# Patient Record
Sex: MALE | Race: WHITE | NOT HISPANIC OR LATINO | Employment: UNEMPLOYED | URBAN - METROPOLITAN AREA
[De-identification: names, ages, dates, MRNs, and addresses within clinical notes are randomized per-mention and may not be internally consistent; named-entity substitution may affect disease eponyms.]

---

## 2022-08-30 ENCOUNTER — HOSPITAL ENCOUNTER (OUTPATIENT)
Facility: HOSPITAL | Age: 62
Setting detail: OBSERVATION
End: 2022-08-30
Attending: INTERNAL MEDICINE | Admitting: INTERNAL MEDICINE

## 2022-09-09 ENCOUNTER — APPOINTMENT (OUTPATIENT)
Age: 62
End: 2022-09-09

## 2022-09-09 VITALS
OXYGEN SATURATION: 98 % | WEIGHT: 155 LBS | BODY MASS INDEX: 20.99 KG/M2 | SYSTOLIC BLOOD PRESSURE: 127 MMHG | TEMPERATURE: 96.8 F | HEIGHT: 72 IN | RESPIRATION RATE: 14 BRPM | DIASTOLIC BLOOD PRESSURE: 84 MMHG | HEART RATE: 88 BPM

## 2022-09-09 DIAGNOSIS — K21.00 GASTRO-ESOPHAGEAL REFLUX DISEASE WITH ESOPHAGITIS, WITHOUT BLEEDING: ICD-10-CM

## 2022-09-09 PROBLEM — Z00.00 ENCOUNTER FOR PREVENTIVE HEALTH EXAMINATION: Status: ACTIVE | Noted: 2022-09-09

## 2022-09-09 PROCEDURE — XXXXX: CPT | Mod: 1L

## 2022-09-09 NOTE — PHYSICAL EXAM
[Normal Sclera/Conjunctiva] : normal sclera/conjunctiva [Normal] : normal rate, regular rhythm, normal S1 and S2 and no murmur heard

## 2022-09-15 RX ORDER — OMEPRAZOLE 20 MG/1
20 CAPSULE, DELAYED RELEASE ORAL DAILY
Qty: 30 | Refills: 0 | Status: ACTIVE | COMMUNITY
Start: 2022-09-09 | End: 1900-01-01

## 2023-05-14 NOTE — PLAN
[FreeTextEntry1] : 62 y.o. m comes in presenting w/ acute epigastric pain. Most likely GERD with esophagitis w/o hemorrhage. Start omeprazole 20 mg. Patient advised to avoid soda, chocolate, caffeine, and other foods/drinks that can trigger GERD.\par \par Will f/u in 1 month for annual physical.

## 2023-05-14 NOTE — HISTORY OF PRESENT ILLNESS
[FreeTextEntry8] : 62 y.o. m comes in presenting w/ acute epigastric pain that started a week ago and is worse right before going to sleep. Denies any vomiting but has nausea. Has been eating mostly beans, chicken, soda, and coffee. Doesn't eat spicy foods.

## 2023-05-14 NOTE — HEALTH RISK ASSESSMENT
[Current] : Current [20 or more] : 20 or more [No] : In the past 12 months have you used drugs other than those required for medical reasons? No [Audit-CScore] : 0 [de-identified] : beans, chicken, soda, coffee, no spicy food

## 2023-05-14 NOTE — REVIEW OF SYSTEMS
[Abdominal Pain] : abdominal pain [Nausea] : nausea [Negative] : Heme/Lymph [Constipation] : no constipation [Diarrhea] : no diarrhea [Vomiting] : no vomiting [Melena] : no melena

## 2023-06-30 ENCOUNTER — HOSPITAL ENCOUNTER (EMERGENCY)
Facility: HOSPITAL | Age: 63
Discharge: HOME | End: 2023-06-30
Attending: STUDENT IN AN ORGANIZED HEALTH CARE EDUCATION/TRAINING PROGRAM
Payer: MEDICAID

## 2023-06-30 VITALS
RESPIRATION RATE: 15 BRPM | SYSTOLIC BLOOD PRESSURE: 106 MMHG | HEIGHT: 70 IN | HEART RATE: 81 BPM | WEIGHT: 180 LBS | TEMPERATURE: 98 F | OXYGEN SATURATION: 100 % | BODY MASS INDEX: 25.77 KG/M2 | DIASTOLIC BLOOD PRESSURE: 77 MMHG

## 2023-06-30 DIAGNOSIS — M25.50 POLYARTHRALGIA: Primary | ICD-10-CM

## 2023-06-30 PROCEDURE — 94761 N-INVAS EAR/PLS OXIMETRY MLT: CPT

## 2023-06-30 PROCEDURE — 63600175 PHARM REV CODE 636 W HCPCS

## 2023-06-30 PROCEDURE — 99283 EMERGENCY DEPT VISIT LOW MDM: CPT

## 2023-06-30 RX ORDER — PREDNISONE 5 MG/1
TABLET ORAL
Qty: 39 TABLET | Refills: 0 | Status: SHIPPED | OUTPATIENT
Start: 2023-07-01 | End: 2023-07-21

## 2023-06-30 RX ADMIN — PREDNISONE 15 MG: 5 TABLET ORAL at 10:06

## 2023-06-30 NOTE — DISCHARGE INSTRUCTIONS
I sent your prescription to the [facility] pharmacy in [facility].  The address is [address] [place] [address].    Please return to the emergency department if you develop any new or worsening symptoms.  This could include worsening fever, worsening pain, inability to keep food/water down.    Otherwise follow-up with [facility] rheumatology for further workup.

## 2023-06-30 NOTE — ED PROVIDER NOTES
Encounter Date: 6/30/2023       History     Chief Complaint   Patient presents with    Edema     Hx of gout     63-year-old male with no reported past medical history presents to the ED complaining of a 2 day history of worsening joint pain in the bilateral hands bilateral knees and bilateral ankles.  He reports the pain to be in 9/10 in terms of intensity and describes to be sharp in nature.  He states that he went to [facility] where he is prescribed ibuprofen which has not helped alleviate his pain.  Typically these pain flares have occurred in the past and they spontaneously resolve.  He denies chest pain, abdominal pain, headache, fever, chills, nausea, vomiting, diarrhea and dysuria.     The history is provided by the patient and medical records.   Review of patient's allergies indicates:  Not on File  No past medical history on file.  No past surgical history on file.  No family history on file.     Review of Systems    Physical Exam     Initial Vitals [06/30/23 0855]   BP Pulse Resp Temp SpO2   124/74 96 15 97.8 °F (36.6 °C) 100 %      MAP       --         Physical Exam    Nursing note and vitals reviewed.  Constitutional: Vital signs are normal. He appears well-developed and well-nourished. He is not diaphoretic. No distress.   Appears uncomfortable due to symptoms.   HENT:   Head: Normocephalic and atraumatic.   Right Ear: External ear normal.   Left Ear: External ear normal.   Eyes: EOM are normal. Right eye exhibits no discharge. Left eye exhibits no discharge.   Neck: Trachea normal. Neck supple. No thyroid mass present.   Normal range of motion.  Cardiovascular:  Normal rate, regular rhythm, normal heart sounds and intact distal pulses.     Exam reveals no gallop and no friction rub.       No murmur heard.  Pulmonary/Chest: Breath sounds normal. No respiratory distress. He has no wheezes. He has no rhonchi. He has no rales.   Abdominal: Abdomen is soft. Bowel sounds are normal. He exhibits no distension.  There is no abdominal tenderness. There is no rebound and no guarding.   Musculoskeletal:         General: Tenderness (Tenderness along the bilateral hands, bilateral knees and bilateral ankles.) and edema (Bilateral hands, bilateral knees and bilateral ankles) present.      Cervical back: Normal range of motion and neck supple.      Comments: Limited range of motion of the mentioned joints secondary to pain and swelling.     Neurological: He is alert and oriented to person, place, and time. He has normal strength. No cranial nerve deficit or sensory deficit. GCS score is 15. GCS eye subscore is 4. GCS verbal subscore is 5. GCS motor subscore is 6.   Skin: Skin is warm and dry. Capillary refill takes less than 2 seconds. No rash noted.   No erythema or warmth noted at the joints.   Psychiatric: He has a normal mood and affect.       ED Course   Procedures  Labs Reviewed   HIV 1 / 2 ANTIBODY   HEPATITIS C ANTIBODY          Imaging Results    None          Medications   predniSONE tablet 15 mg (15 mg Oral Given 6/30/23 1046)     Medical Decision Making:   Initial Assessment:   63-year-old male who appears to be uncomfortable due to symptoms presents to the ED complaining of polyarthralgia.  ABC's intact.  Initial triage vital signs are within normal limits.  Differential Diagnosis:   Rheumatoid arthritis likely due to positive rheumatoid factor and [facility] rheumatology evaluation and [facility] evaluation.  Gout  Osteoarthritis  Unlikely septic arthritis due to lack of fever, lack of warmth or erythema surrounding the joints.  ED Management:  See ED course.           ED Course as of 06/30/23 1055   Fri Jun 30, 2023   1035 During my initial evaluation patient notified me that he did not take his prednisone taper that was prescribed to him by [facility] for a working diagnosis of rheumatoid arthritis.  I will represcribe him the prednisone taper to the pharmacy that he selected.  I will give him his 1st dose of  15 mg of prednisone here in the ED prior to discharge.  Return precautions provided.  Will discharge the patient. [BG]   1051 Prednisone taper per [facility] rheumatology as follows: [id number] ->[id number] -> 5mg/day [BG]      ED Course User Index  [BG] Vidal Hernandez MD                 Clinical Impression:   Final diagnoses:  [M25.50] Polyarthralgia (Primary)        ED Disposition Condition    Discharge Stable          ED Prescriptions       Medication Sig Dispense Start Date End Date Auth. Provider    predniSONE (DELTASONE) 5 MG tablet Take 3 tablets (15 mg total) by mouth once daily for 6 days, THEN 2 tablets (10 mg total) once daily for 7 days, THEN 1 tablet (5 mg total) once daily for 7 days. 39 tablet 7/1/2023 7/21/2023 Vidal Hernandez MD          Follow-up Information       Follow up With Specialties Details Why Contact Info    [facility] - Emergency Dept Emergency Medicine Go to  If symptoms worsen [id number]  [facility] [phone]             Vidal Hernandez MD  Resident  06/30/23 0617

## 2023-10-06 ENCOUNTER — HOSPITAL ENCOUNTER (INPATIENT)
Facility: HOSPITAL | Age: 63
LOS: 3 days | Discharge: NURSING, REHAB OR LONG-TERM CARE | DRG: 698 | End: 2023-10-09
Attending: STUDENT IN AN ORGANIZED HEALTH CARE EDUCATION/TRAINING PROGRAM | Admitting: HOSPITALIST
Payer: MEDICAID

## 2023-10-06 DIAGNOSIS — E83.52 HYPERCALCEMIA: ICD-10-CM

## 2023-10-06 DIAGNOSIS — R00.0 TACHYCARDIA: ICD-10-CM

## 2023-10-06 DIAGNOSIS — R62.7 FAILURE TO THRIVE IN ADULT: Primary | ICD-10-CM

## 2023-10-06 DIAGNOSIS — M05.79 RHEUMATOID ARTHRITIS INVOLVING MULTIPLE SITES WITH POSITIVE RHEUMATOID FACTOR: Chronic | ICD-10-CM

## 2023-10-06 DIAGNOSIS — R07.9 CHEST PAIN: ICD-10-CM

## 2023-10-06 PROBLEM — N40.1 BPH WITH OBSTRUCTION/LOWER URINARY TRACT SYMPTOMS: Status: ACTIVE | Noted: 2023-09-17

## 2023-10-06 PROBLEM — K59.00 CONSTIPATION: Status: ACTIVE | Noted: 2023-09-22

## 2023-10-06 PROBLEM — E43 SEVERE PROTEIN-CALORIE MALNUTRITION: Status: ACTIVE | Noted: 2023-08-21

## 2023-10-06 PROBLEM — Z72.0 TOBACCO USER: Status: ACTIVE | Noted: 2023-09-22

## 2023-10-06 PROBLEM — R10.13 DYSPEPSIA: Status: ACTIVE | Noted: 2023-09-22

## 2023-10-06 PROBLEM — R53.81 PHYSICAL DECONDITIONING: Status: ACTIVE | Noted: 2023-09-18

## 2023-10-06 PROBLEM — R52 INTRACTABLE PAIN: Status: ACTIVE | Noted: 2023-06-10

## 2023-10-06 PROBLEM — R33.9 RETENTION OF URINE: Status: ACTIVE | Noted: 2023-09-22

## 2023-10-06 PROBLEM — D64.9 ANEMIA: Status: ACTIVE | Noted: 2023-09-22

## 2023-10-06 PROBLEM — D50.9 IRON DEFICIENCY ANEMIA: Status: ACTIVE | Noted: 2023-06-13

## 2023-10-06 PROBLEM — Z22.7 LATENT TUBERCULOSIS: Status: ACTIVE | Noted: 2023-10-06

## 2023-10-06 PROBLEM — M13.0 POLYARTHRITIS: Status: ACTIVE | Noted: 2023-06-13

## 2023-10-06 PROBLEM — F51.01 PRIMARY INSOMNIA: Status: ACTIVE | Noted: 2023-09-22

## 2023-10-06 PROBLEM — N13.8 BPH WITH OBSTRUCTION/LOWER URINARY TRACT SYMPTOMS: Status: ACTIVE | Noted: 2023-09-17

## 2023-10-06 LAB
ALBUMIN SERPL BCP-MCNC: 2.1 G/DL (ref 3.5–5.2)
ALP SERPL-CCNC: 102 U/L (ref 55–135)
ALT SERPL W/O P-5'-P-CCNC: 11 U/L (ref 10–44)
ANION GAP SERPL CALC-SCNC: 11 MMOL/L (ref 8–16)
AST SERPL-CCNC: 12 U/L (ref 10–40)
BASOPHILS # BLD AUTO: 0.01 K/UL (ref 0–0.2)
BASOPHILS NFR BLD: 0.1 % (ref 0–1.9)
BILIRUB SERPL-MCNC: 0.4 MG/DL (ref 0.1–1)
BUN SERPL-MCNC: 11 MG/DL (ref 8–23)
CALCIUM SERPL-MCNC: 12.9 MG/DL (ref 8.7–10.5)
CHLORIDE SERPL-SCNC: 101 MMOL/L (ref 95–110)
CK SERPL-CCNC: <7 U/L (ref 20–200)
CO2 SERPL-SCNC: 30 MMOL/L (ref 23–29)
CREAT SERPL-MCNC: 0.6 MG/DL (ref 0.5–1.4)
DIFFERENTIAL METHOD: ABNORMAL
EOSINOPHIL # BLD AUTO: 0.2 K/UL (ref 0–0.5)
EOSINOPHIL NFR BLD: 2.2 % (ref 0–8)
ERYTHROCYTE [DISTWIDTH] IN BLOOD BY AUTOMATED COUNT: 17 % (ref 11.5–14.5)
EST. GFR  (NO RACE VARIABLE): >60 ML/MIN/1.73 M^2
GLUCOSE SERPL-MCNC: 92 MG/DL (ref 70–110)
HCT VFR BLD AUTO: 26.7 % (ref 40–54)
HCV AB SERPL QL IA: NORMAL
HGB BLD-MCNC: 7.8 G/DL (ref 14–18)
HIV 1+2 AB+HIV1 P24 AG SERPL QL IA: NORMAL
IMM GRANULOCYTES # BLD AUTO: 0.04 K/UL (ref 0–0.04)
IMM GRANULOCYTES NFR BLD AUTO: 0.6 % (ref 0–0.5)
LYMPHOCYTES # BLD AUTO: 1.5 K/UL (ref 1–4.8)
LYMPHOCYTES NFR BLD: 22.4 % (ref 18–48)
MAGNESIUM SERPL-MCNC: 1.6 MG/DL (ref 1.6–2.6)
MCH RBC QN AUTO: 27.4 PG (ref 27–31)
MCHC RBC AUTO-ENTMCNC: 29.2 G/DL (ref 32–36)
MCV RBC AUTO: 94 FL (ref 82–98)
MONOCYTES # BLD AUTO: 0.7 K/UL (ref 0.3–1)
MONOCYTES NFR BLD: 10.8 % (ref 4–15)
NEUTROPHILS # BLD AUTO: 4.3 K/UL (ref 1.8–7.7)
NEUTROPHILS NFR BLD: 63.9 % (ref 38–73)
NRBC BLD-RTO: 0 /100 WBC
PHOSPHATE SERPL-MCNC: 2.6 MG/DL (ref 2.7–4.5)
PLATELET # BLD AUTO: 318 K/UL (ref 150–450)
PMV BLD AUTO: 9.9 FL (ref 9.2–12.9)
POTASSIUM SERPL-SCNC: 3 MMOL/L (ref 3.5–5.1)
PROT SERPL-MCNC: 8.5 G/DL (ref 6–8.4)
RBC # BLD AUTO: 2.85 M/UL (ref 4.6–6.2)
SODIUM SERPL-SCNC: 142 MMOL/L (ref 136–145)
T4 FREE SERPL-MCNC: 1.18 NG/DL (ref 0.71–1.51)
TROPONIN I SERPL DL<=0.01 NG/ML-MCNC: 0.01 NG/ML (ref 0–0.03)
TSH SERPL DL<=0.005 MIU/L-ACNC: 6.33 UIU/ML (ref 0.4–4)
WBC # BLD AUTO: 6.73 K/UL (ref 3.9–12.7)

## 2023-10-06 PROCEDURE — 84439 ASSAY OF FREE THYROXINE: CPT | Performed by: STUDENT IN AN ORGANIZED HEALTH CARE EDUCATION/TRAINING PROGRAM

## 2023-10-06 PROCEDURE — 63600175 PHARM REV CODE 636 W HCPCS: Performed by: STUDENT IN AN ORGANIZED HEALTH CARE EDUCATION/TRAINING PROGRAM

## 2023-10-06 PROCEDURE — 80053 COMPREHEN METABOLIC PANEL: CPT | Performed by: STUDENT IN AN ORGANIZED HEALTH CARE EDUCATION/TRAINING PROGRAM

## 2023-10-06 PROCEDURE — 25000003 PHARM REV CODE 250: Performed by: HOSPITALIST

## 2023-10-06 PROCEDURE — 86803 HEPATITIS C AB TEST: CPT | Performed by: PHYSICIAN ASSISTANT

## 2023-10-06 PROCEDURE — 99285 EMERGENCY DEPT VISIT HI MDM: CPT | Mod: 25

## 2023-10-06 PROCEDURE — 84484 ASSAY OF TROPONIN QUANT: CPT | Performed by: STUDENT IN AN ORGANIZED HEALTH CARE EDUCATION/TRAINING PROGRAM

## 2023-10-06 PROCEDURE — 25000003 PHARM REV CODE 250: Performed by: STUDENT IN AN ORGANIZED HEALTH CARE EDUCATION/TRAINING PROGRAM

## 2023-10-06 PROCEDURE — 85025 COMPLETE CBC W/AUTO DIFF WBC: CPT | Performed by: STUDENT IN AN ORGANIZED HEALTH CARE EDUCATION/TRAINING PROGRAM

## 2023-10-06 PROCEDURE — 84100 ASSAY OF PHOSPHORUS: CPT | Performed by: STUDENT IN AN ORGANIZED HEALTH CARE EDUCATION/TRAINING PROGRAM

## 2023-10-06 PROCEDURE — 84443 ASSAY THYROID STIM HORMONE: CPT | Performed by: STUDENT IN AN ORGANIZED HEALTH CARE EDUCATION/TRAINING PROGRAM

## 2023-10-06 PROCEDURE — 12000002 HC ACUTE/MED SURGE SEMI-PRIVATE ROOM

## 2023-10-06 PROCEDURE — 93010 ELECTROCARDIOGRAM REPORT: CPT | Mod: ,,, | Performed by: INTERNAL MEDICINE

## 2023-10-06 PROCEDURE — 99223 PR INITIAL HOSPITAL CARE,LEVL III: ICD-10-PCS | Mod: ,,, | Performed by: HOSPITALIST

## 2023-10-06 PROCEDURE — 93005 ELECTROCARDIOGRAM TRACING: CPT

## 2023-10-06 PROCEDURE — 83735 ASSAY OF MAGNESIUM: CPT | Performed by: STUDENT IN AN ORGANIZED HEALTH CARE EDUCATION/TRAINING PROGRAM

## 2023-10-06 PROCEDURE — 96360 HYDRATION IV INFUSION INIT: CPT

## 2023-10-06 PROCEDURE — 87389 HIV-1 AG W/HIV-1&-2 AB AG IA: CPT | Performed by: PHYSICIAN ASSISTANT

## 2023-10-06 PROCEDURE — 93010 EKG 12-LEAD: ICD-10-PCS | Mod: ,,, | Performed by: INTERNAL MEDICINE

## 2023-10-06 PROCEDURE — 82550 ASSAY OF CK (CPK): CPT | Performed by: STUDENT IN AN ORGANIZED HEALTH CARE EDUCATION/TRAINING PROGRAM

## 2023-10-06 PROCEDURE — 99223 1ST HOSP IP/OBS HIGH 75: CPT | Mod: ,,, | Performed by: HOSPITALIST

## 2023-10-06 RX ORDER — MIRTAZAPINE 15 MG/1
15 TABLET, FILM COATED ORAL NIGHTLY
Status: ON HOLD | COMMUNITY
Start: 2023-09-18 | End: 2023-10-20 | Stop reason: HOSPADM

## 2023-10-06 RX ORDER — DICLOFENAC SODIUM 10 MG/G
2 GEL TOPICAL 4 TIMES DAILY PRN
COMMUNITY
Start: 2023-09-18

## 2023-10-06 RX ORDER — IBUPROFEN 200 MG
1 TABLET ORAL
Status: ON HOLD | COMMUNITY
Start: 2023-09-20 | End: 2023-10-07 | Stop reason: CLARIF

## 2023-10-06 RX ORDER — RIFAMPIN 300 MG/1
600 CAPSULE ORAL DAILY
COMMUNITY
Start: 2023-09-18

## 2023-10-06 RX ORDER — OXYCODONE HYDROCHLORIDE 5 MG/1
5 TABLET ORAL EVERY 6 HOURS PRN
COMMUNITY
Start: 2023-09-18

## 2023-10-06 RX ORDER — TIZANIDINE 2 MG/1
2 TABLET ORAL EVERY 6 HOURS PRN
COMMUNITY
Start: 2023-09-18

## 2023-10-06 RX ORDER — FOLIC ACID 1 MG/1
1 TABLET ORAL DAILY
COMMUNITY
Start: 2023-09-18

## 2023-10-06 RX ORDER — SULFASALAZINE 500 MG/1
1000 TABLET ORAL 2 TIMES DAILY
COMMUNITY
Start: 2023-09-18

## 2023-10-06 RX ORDER — ACETAMINOPHEN 500 MG
5 TABLET ORAL NIGHTLY
COMMUNITY
Start: 2023-09-16

## 2023-10-06 RX ORDER — FAMOTIDINE 20 MG/1
20 TABLET, FILM COATED ORAL 2 TIMES DAILY
Status: ON HOLD | COMMUNITY
Start: 2023-09-18 | End: 2023-10-20 | Stop reason: HOSPADM

## 2023-10-06 RX ORDER — PREDNISONE 10 MG/1
TABLET ORAL
Status: ON HOLD | COMMUNITY
Start: 2023-09-16 | End: 2023-10-07 | Stop reason: CLARIF

## 2023-10-06 RX ORDER — OXYCODONE HYDROCHLORIDE 5 MG/1
10 TABLET ORAL ONCE
Status: COMPLETED | OUTPATIENT
Start: 2023-10-06 | End: 2023-10-06

## 2023-10-06 RX ORDER — FERROUS SULFATE, DRIED 160(50) MG
1 TABLET, EXTENDED RELEASE ORAL NIGHTLY
Status: ON HOLD | COMMUNITY
Start: 2023-09-16 | End: 2023-10-08 | Stop reason: HOSPADM

## 2023-10-06 RX ORDER — POTASSIUM CHLORIDE 7.45 MG/ML
10 INJECTION INTRAVENOUS
Status: COMPLETED | OUTPATIENT
Start: 2023-10-06 | End: 2023-10-06

## 2023-10-06 RX ORDER — LANOLIN ALCOHOL/MO/W.PET/CERES
1 CREAM (GRAM) TOPICAL NIGHTLY
COMMUNITY
Start: 2023-09-16 | End: 2024-09-15

## 2023-10-06 RX ORDER — MULTIVITAMIN
1 TABLET ORAL DAILY
Status: ON HOLD | COMMUNITY
Start: 2023-08-04 | End: 2023-10-07 | Stop reason: CLARIF

## 2023-10-06 RX ORDER — CALCIUM CARBONATE 200(500)MG
500 TABLET,CHEWABLE ORAL 3 TIMES DAILY PRN
Status: ON HOLD | COMMUNITY
Start: 2023-09-16 | End: 2023-10-08 | Stop reason: HOSPADM

## 2023-10-06 RX ORDER — HYDROXYCHLOROQUINE SULFATE 300 MG/1
300 TABLET ORAL DAILY
COMMUNITY
Start: 2023-09-18

## 2023-10-06 RX ORDER — POLYETHYLENE GLYCOL 3350 17 G/17G
POWDER, FOR SOLUTION ORAL
COMMUNITY
Start: 2023-09-17

## 2023-10-06 RX ORDER — TAMSULOSIN HYDROCHLORIDE 0.4 MG/1
1 CAPSULE ORAL NIGHTLY
COMMUNITY
Start: 2023-09-18

## 2023-10-06 RX ORDER — PREDNISONE 10 MG/1
10 TABLET ORAL NIGHTLY
COMMUNITY
Start: 2023-09-18

## 2023-10-06 RX ORDER — METHOTREXATE 2.5 MG/1
15 TABLET ORAL
Status: ON HOLD | COMMUNITY
Start: 2023-07-08 | End: 2023-10-07 | Stop reason: CLARIF

## 2023-10-06 RX ADMIN — SODIUM CHLORIDE 1000 ML: 9 INJECTION, SOLUTION INTRAVENOUS at 09:10

## 2023-10-06 RX ADMIN — OXYCODONE HYDROCHLORIDE 10 MG: 5 TABLET ORAL at 10:10

## 2023-10-06 RX ADMIN — SODIUM CHLORIDE 1000 ML: 9 INJECTION, SOLUTION INTRAVENOUS at 07:10

## 2023-10-06 RX ADMIN — POTASSIUM CHLORIDE 10 MEQ: 7.46 INJECTION, SOLUTION INTRAVENOUS at 09:10

## 2023-10-06 RX ADMIN — POTASSIUM BICARBONATE 40 MEQ: 391 TABLET, EFFERVESCENT ORAL at 09:10

## 2023-10-06 NOTE — Clinical Note
Diagnosis: Hypercalcemia [275.42.ICD-9-CM]   Admitting Provider:: ESTHER AL [[id number]]   Future Attending Provider: ESTHER AL [[id number]]   Reason for IP Medical Treatment  (Clinical interventions that can only be accomplished in the IP setting? ) :: Hypercalcemia   I certify that Inpatient services for greater than or equal to 2 midnights are medically necessary:: Yes   Plans for Post-Acute care--if anticipated (pick the single best option):: A. No post acute care anticipated at this time   Special Needs:: No Special Needs [1]

## 2023-10-06 NOTE — ED PROVIDER NOTES
Encounter Date: 10/6/2023       History     Chief Complaint   Patient presents with    Failure To Thrive     From [facility]. Nurse states patient no eating or drinking     63 y.o. male with rheumatoid arthritis, physical debility presents via EMS from nursing home for decreased p.o. intake.  Per patient, he reports he ate some grits this morning and thinks he was eating enough and drinking enough today.  He denies any symptoms currently.  He denies any chest pain, shortness of breath, fever, difficulty urinating.  He does have an indwelling Fernandez which appears dark    The history is provided by the patient and medical records.     Review of patient's allergies indicates:  Not on File  No past medical history on file.  No past surgical history on file.  No family history on file.     Review of Systems   Reason unable to perform ROS: See HPI for relevant ROS.       Physical Exam     Initial Vitals [10/06/23 1810]   BP Pulse Resp Temp SpO2   115/68 (!) 130 (!) 22 97.7 °F (36.5 °C) 98 %      MAP       --         Physical Exam    Nursing note and vitals reviewed.  Constitutional:   Alert, speaking full sentences, no acute distress   HENT:   Oropharynx dry   Eyes: Conjunctivae and EOM are normal. Pupils are equal, round, and reactive to light. No scleral icterus.   Cardiovascular:  Regular rhythm and intact distal pulses.           Tachycardic   Pulmonary/Chest: Breath sounds normal. No respiratory distress.   Abdominal: Abdomen is soft. He exhibits no distension.   Musculoskeletal:      Comments: Contractures of bilateral wrists with associated tenderness     Neurological: He is alert and oriented to person, place, and time.   Diminished strength in all 4 extremities  GCS 15   Skin: Skin is warm and dry.         ED Course   Procedures  Labs Reviewed   CBC W/ AUTO DIFFERENTIAL - Abnormal; Notable for the following components:       Result Value    RBC 2.85 (*)     Hemoglobin 7.8 (*)     Hematocrit 26.7 (*)      MCHC 29.2 (*)     RDW 17.0 (*)     Immature Granulocytes 0.6 (*)     All other components within normal limits   COMPREHENSIVE METABOLIC PANEL - Abnormal; Notable for the following components:    Potassium 3.0 (*)     CO2 30 (*)     Calcium 12.9 (*)     Total Protein 8.5 (*)     Albumin 2.1 (*)     All other components within normal limits   PHOSPHORUS - Abnormal; Notable for the following components:    Phosphorus 2.6 (*)     All other components within normal limits   CK - Abnormal; Notable for the following components:    CPK <7 (*)     All other components within normal limits    Narrative:     Add on CK, Trop and TSH per Dr. Dorsey @ 20:24 pm to order #   [id number]   TSH - Abnormal; Notable for the following components:    TSH 6.333 (*)     All other components within normal limits    Narrative:     Add on CK, Trop and TSH per Dr. Dorsey @ 20:24 pm to order #   [id number]   HIV 1 / 2 ANTIBODY    Narrative:     Release to patient->Immediate   HEPATITIS C ANTIBODY    Narrative:     Release to patient->Immediate   MAGNESIUM   TROPONIN I   CK   TSH   TROPONIN I    Narrative:     Add on CK, Trop and TSH per Dr. Dorsey @ 20:24 pm to order #   [id number]   T4, FREE     EKG Readings: (Independently Interpreted)   Sinus tachycardia, regular, narrow complex, rate of 120, no STEMI, nonspecific ST changes inferiorly, no prior available for comparison       Imaging Results              X-Ray Chest AP Portable (Final result)  Result time 10/06/23 19:44:04      Final result by Chetan Romo DO (10/06/23 19:44:04)                   Impression:      No acute abnormality.    Hyperexpanded lungs and emphysematous changes as above.      Electronically signed by: Chetan Romo  Date:    10/06/2023  Time:    19:44               Narrative:    EXAMINATION:  XR CHEST AP PORTABLE    CLINICAL HISTORY:  Tachycardia, unspecified    TECHNIQUE:  Single frontal view of the chest was performed.    COMPARISON:  None    FINDINGS:  The  lungs are hyperexpanded.  There are bullous emphysematous changes of the lung apices.  There is mild coarse chronic interstitial prominence.  There is no large focal consolidation.  The pleural spaces are clear.  The cardiac silhouette is unremarkable.  Osseous structures demonstrate degenerative changes.                                       Medications   sodium chloride 0.9% bolus 1,000 mL 1,000 mL (1,000 mLs Intravenous New Bag 10/6/23 2105)   sodium chloride 0.9% bolus 1,000 mL 1,000 mL (0 mLs Intravenous Stopped 10/6/23 2028)   potassium chloride 10 mEq in 100 mL IVPB (0 mEq Intravenous Stopped 10/6/23 2111)   potassium bicarbonate disintegrating tablet 40 mEq (40 mEq Oral Given 10/6/23 2110)     Medical Decision Making  63 y.o. male with rheumatoid arthritis, physical debility presents via EMS from nursing home for decreased p.o. intake  Differentials include dehydration, JOSE MANUEL, metabolic derangement, failure to thrive  Patient is severely dehydrated on exam, tachycardic.  Patient is alert, no focal neurological deficits, he has diffuse weakness.  He is oriented to person, place, time, and event, including the fact that he was sent in due to not eating and drinking  EKG shows sinus tachycardia, he does have T-wave inversions inferiorly, there is no prior visible EKG for comparison, comments on prior EKGs were reviewed and did not mentioned T-wave inversions,  troponin ordered.  Patient does not have any chest pain or complaints currently  Fernandez in place with dark urine, he denies urinary symptoms    Amount and/or Complexity of Data Reviewed  External Data Reviewed: labs, radiology, ECG and notes.  Labs: ordered. Decision-making details documented in ED Course.  Radiology: ordered.               ED Course as of 10/06/23 2131   Fri Oct 06, 2023   2011 Potassium(!): 3.0 [OK]   2012 Hemoglobin(!): 7.8  At approximate baseline [OK]   2023 Calcium(!!): 12.9 [OK]   2130 Patient found to have multiple metabolic  "derangements, additional IV fluids were ordered as well as potassium replenishment.  Discussed admission plan with patient.  He is amenable to staying, however he feels overwhelmed.  He made statements including he has thought about "giving up".  Patient denies SI at bedside.  Counseled patient at bedside regarding his condition.  He states he is just tired of being in and out of the hospital.  Patient admitted to hospital medicine [OK]      ED Course User Index  [OK] Maco Dorsey MD                      Clinical Impression:   Final diagnoses:  [R00.0] Tachycardia  [E83.52] Hypercalcemia  [R62.7] Failure to thrive in adult (Primary)        ED Disposition Condition    Admit Stable                Maco Dorsey MD  10/06/23 5692    "

## 2023-10-07 PROBLEM — K21.9 GASTROESOPHAGEAL REFLUX DISEASE: Chronic | Status: ACTIVE | Noted: 2023-09-22

## 2023-10-07 PROBLEM — E87.6 HYPOKALEMIA: Status: ACTIVE | Noted: 2023-10-07

## 2023-10-07 PROBLEM — E83.52 HYPERCALCEMIA: Chronic | Status: ACTIVE | Noted: 2023-10-07

## 2023-10-07 PROBLEM — N40.1 URINARY RETENTION DUE TO BENIGN PROSTATIC HYPERPLASIA: Chronic | Status: ACTIVE | Noted: 2023-09-22

## 2023-10-07 PROBLEM — N39.0 URINARY TRACT INFECTION ASSOCIATED WITH INDWELLING URETHRAL CATHETER: Status: ACTIVE | Noted: 2023-10-07

## 2023-10-07 PROBLEM — E83.52 HYPERCALCEMIA: Chronic | Status: ACTIVE | Noted: 2023-08-28

## 2023-10-07 PROBLEM — M05.79 RHEUMATOID ARTHRITIS INVOLVING MULTIPLE SITES WITH POSITIVE RHEUMATOID FACTOR: Chronic | Status: ACTIVE | Noted: 2023-07-12

## 2023-10-07 PROBLEM — T83.511A URINARY TRACT INFECTION ASSOCIATED WITH INDWELLING URETHRAL CATHETER: Status: ACTIVE | Noted: 2023-10-07

## 2023-10-07 PROBLEM — R53.81 PHYSICAL DECONDITIONING: Chronic | Status: ACTIVE | Noted: 2023-09-18

## 2023-10-07 PROBLEM — F51.01 PRIMARY INSOMNIA: Chronic | Status: ACTIVE | Noted: 2023-09-22

## 2023-10-07 PROBLEM — R10.13 DYSPEPSIA: Chronic | Status: ACTIVE | Noted: 2023-09-22

## 2023-10-07 PROBLEM — Z97.8 CHRONIC INDWELLING FOLEY CATHETER: Chronic | Status: ACTIVE | Noted: 2023-10-07

## 2023-10-07 PROBLEM — E83.52 HYPERCALCEMIA: Status: ACTIVE | Noted: 2023-10-07

## 2023-10-07 PROBLEM — D50.9 IRON DEFICIENCY ANEMIA: Chronic | Status: ACTIVE | Noted: 2023-06-13

## 2023-10-07 PROBLEM — R33.8 URINARY RETENTION DUE TO BENIGN PROSTATIC HYPERPLASIA: Chronic | Status: ACTIVE | Noted: 2023-09-22

## 2023-10-07 PROBLEM — Z78.9 NURSING HOME RESIDENT: Chronic | Status: ACTIVE | Noted: 2023-09-22

## 2023-10-07 LAB
25(OH)D3+25(OH)D2 SERPL-MCNC: 27 NG/ML (ref 30–96)
ALBUMIN SERPL BCP-MCNC: 1.8 G/DL (ref 3.5–5.2)
ALP SERPL-CCNC: 88 U/L (ref 55–135)
ALT SERPL W/O P-5'-P-CCNC: 9 U/L (ref 10–44)
AMMONIA PLAS-SCNC: 17 UMOL/L (ref 10–50)
ANION GAP SERPL CALC-SCNC: 10 MMOL/L (ref 8–16)
AST SERPL-CCNC: 12 U/L (ref 10–40)
BACTERIA #/AREA URNS AUTO: ABNORMAL /HPF
BASOPHILS # BLD AUTO: 0 K/UL (ref 0–0.2)
BASOPHILS NFR BLD: 0 % (ref 0–1.9)
BILIRUB SERPL-MCNC: 0.3 MG/DL (ref 0.1–1)
BILIRUB UR QL STRIP: NEGATIVE
BUN SERPL-MCNC: 10 MG/DL (ref 8–23)
CA-I BLDV-SCNC: 1.65 MMOL/L (ref 1.06–1.42)
CALCIUM SERPL-MCNC: 12.2 MG/DL (ref 8.7–10.5)
CHLORIDE SERPL-SCNC: 109 MMOL/L (ref 95–110)
CLARITY UR REFRACT.AUTO: ABNORMAL
CO2 SERPL-SCNC: 25 MMOL/L (ref 23–29)
COLOR UR AUTO: YELLOW
CREAT SERPL-MCNC: 0.6 MG/DL (ref 0.5–1.4)
DIFFERENTIAL METHOD: ABNORMAL
EOSINOPHIL # BLD AUTO: 0.1 K/UL (ref 0–0.5)
EOSINOPHIL NFR BLD: 2 % (ref 0–8)
ERYTHROCYTE [DISTWIDTH] IN BLOOD BY AUTOMATED COUNT: 17.1 % (ref 11.5–14.5)
EST. GFR  (NO RACE VARIABLE): >60 ML/MIN/1.73 M^2
FERRITIN SERPL-MCNC: 1124 NG/ML (ref 20–300)
FOLATE SERPL-MCNC: 12.4 NG/ML (ref 4–24)
GLUCOSE SERPL-MCNC: 140 MG/DL (ref 70–110)
GLUCOSE UR QL STRIP: NEGATIVE
HCT VFR BLD AUTO: 25.5 % (ref 40–54)
HGB BLD-MCNC: 7.4 G/DL (ref 14–18)
HGB UR QL STRIP: ABNORMAL
HYALINE CASTS UR QL AUTO: 0 /LPF
IMM GRANULOCYTES # BLD AUTO: 0.03 K/UL (ref 0–0.04)
IMM GRANULOCYTES NFR BLD AUTO: 0.5 % (ref 0–0.5)
IRON SERPL-MCNC: 15 UG/DL (ref 45–160)
KETONES UR QL STRIP: NEGATIVE
LACTATE SERPL-SCNC: 1.1 MMOL/L (ref 0.5–2.2)
LEUKOCYTE ESTERASE UR QL STRIP: ABNORMAL
LYMPHOCYTES # BLD AUTO: 1.3 K/UL (ref 1–4.8)
LYMPHOCYTES NFR BLD: 22.2 % (ref 18–48)
MAGNESIUM SERPL-MCNC: 1.4 MG/DL (ref 1.6–2.6)
MCH RBC QN AUTO: 26.6 PG (ref 27–31)
MCHC RBC AUTO-ENTMCNC: 29 G/DL (ref 32–36)
MCV RBC AUTO: 92 FL (ref 82–98)
MICROSCOPIC COMMENT: ABNORMAL
MONOCYTES # BLD AUTO: 0.5 K/UL (ref 0.3–1)
MONOCYTES NFR BLD: 8.6 % (ref 4–15)
NEUTROPHILS # BLD AUTO: 4 K/UL (ref 1.8–7.7)
NEUTROPHILS NFR BLD: 66.7 % (ref 38–73)
NITRITE UR QL STRIP: NEGATIVE
NRBC BLD-RTO: 0 /100 WBC
PH UR STRIP: 6 [PH] (ref 5–8)
PHOSPHATE SERPL-MCNC: 2.5 MG/DL (ref 2.7–4.5)
PLATELET # BLD AUTO: 291 K/UL (ref 150–450)
PMV BLD AUTO: 10.6 FL (ref 9.2–12.9)
POCT GLUCOSE: 106 MG/DL (ref 70–110)
POTASSIUM SERPL-SCNC: 3.1 MMOL/L (ref 3.5–5.1)
PROT SERPL-MCNC: 7.5 G/DL (ref 6–8.4)
PROT UR QL STRIP: ABNORMAL
PTH-INTACT SERPL-MCNC: 12.9 PG/ML (ref 9–77)
RBC # BLD AUTO: 2.78 M/UL (ref 4.6–6.2)
RBC #/AREA URNS AUTO: >100 /HPF (ref 0–4)
RETICS/RBC NFR AUTO: 1.6 % (ref 0.4–2)
SATURATED IRON: 11 % (ref 20–50)
SODIUM SERPL-SCNC: 144 MMOL/L (ref 136–145)
SP GR UR STRIP: 1.01 (ref 1–1.03)
SQUAMOUS #/AREA URNS AUTO: 6 /HPF
TOTAL IRON BINDING CAPACITY: 132 UG/DL (ref 250–450)
TRANSFERRIN SERPL-MCNC: 89 MG/DL (ref 200–375)
URN SPEC COLLECT METH UR: ABNORMAL
VIT B12 SERPL-MCNC: 616 PG/ML (ref 210–950)
WBC # BLD AUTO: 6.04 K/UL (ref 3.9–12.7)
WBC #/AREA URNS AUTO: 57 /HPF (ref 0–5)

## 2023-10-07 PROCEDURE — 83735 ASSAY OF MAGNESIUM: CPT | Performed by: HOSPITALIST

## 2023-10-07 PROCEDURE — 84100 ASSAY OF PHOSPHORUS: CPT | Performed by: HOSPITALIST

## 2023-10-07 PROCEDURE — 99233 SBSQ HOSP IP/OBS HIGH 50: CPT | Mod: ,,, | Performed by: HOSPITALIST

## 2023-10-07 PROCEDURE — 87086 URINE CULTURE/COLONY COUNT: CPT | Performed by: HOSPITALIST

## 2023-10-07 PROCEDURE — 84207 ASSAY OF VITAMIN B-6: CPT | Performed by: HOSPITALIST

## 2023-10-07 PROCEDURE — 94761 N-INVAS EAR/PLS OXIMETRY MLT: CPT

## 2023-10-07 PROCEDURE — 82728 ASSAY OF FERRITIN: CPT | Performed by: HOSPITALIST

## 2023-10-07 PROCEDURE — 82746 ASSAY OF FOLIC ACID SERUM: CPT | Performed by: HOSPITALIST

## 2023-10-07 PROCEDURE — 82330 ASSAY OF CALCIUM: CPT | Performed by: HOSPITALIST

## 2023-10-07 PROCEDURE — 63600175 PHARM REV CODE 636 W HCPCS: Performed by: HOSPITALIST

## 2023-10-07 PROCEDURE — 82306 VITAMIN D 25 HYDROXY: CPT | Performed by: HOSPITALIST

## 2023-10-07 PROCEDURE — 85025 COMPLETE CBC W/AUTO DIFF WBC: CPT | Performed by: HOSPITALIST

## 2023-10-07 PROCEDURE — 84466 ASSAY OF TRANSFERRIN: CPT | Performed by: HOSPITALIST

## 2023-10-07 PROCEDURE — 83605 ASSAY OF LACTIC ACID: CPT | Performed by: HOSPITALIST

## 2023-10-07 PROCEDURE — 25000003 PHARM REV CODE 250: Performed by: HOSPITALIST

## 2023-10-07 PROCEDURE — 84425 ASSAY OF VITAMIN B-1: CPT | Performed by: HOSPITALIST

## 2023-10-07 PROCEDURE — 83540 ASSAY OF IRON: CPT | Performed by: HOSPITALIST

## 2023-10-07 PROCEDURE — 81001 URINALYSIS AUTO W/SCOPE: CPT | Performed by: HOSPITALIST

## 2023-10-07 PROCEDURE — 85045 AUTOMATED RETICULOCYTE COUNT: CPT | Performed by: HOSPITALIST

## 2023-10-07 PROCEDURE — 99233 PR SUBSEQUENT HOSPITAL CARE,LEVL III: ICD-10-PCS | Mod: ,,, | Performed by: HOSPITALIST

## 2023-10-07 PROCEDURE — 84630 ASSAY OF ZINC: CPT | Performed by: HOSPITALIST

## 2023-10-07 PROCEDURE — 83970 ASSAY OF PARATHORMONE: CPT | Performed by: HOSPITALIST

## 2023-10-07 PROCEDURE — 21400001 HC TELEMETRY ROOM

## 2023-10-07 PROCEDURE — 82607 VITAMIN B-12: CPT | Performed by: HOSPITALIST

## 2023-10-07 PROCEDURE — 80053 COMPREHEN METABOLIC PANEL: CPT | Performed by: HOSPITALIST

## 2023-10-07 PROCEDURE — 82140 ASSAY OF AMMONIA: CPT | Performed by: HOSPITALIST

## 2023-10-07 RX ORDER — POLYETHYLENE GLYCOL 3350 17 G/17G
17 POWDER, FOR SOLUTION ORAL DAILY
Status: DISCONTINUED | OUTPATIENT
Start: 2023-10-07 | End: 2023-10-09 | Stop reason: HOSPADM

## 2023-10-07 RX ORDER — CHLORHEXIDINE GLUCONATE ORAL RINSE 1.2 MG/ML
15 SOLUTION DENTAL 2 TIMES DAILY
Status: DISCONTINUED | OUTPATIENT
Start: 2023-10-07 | End: 2023-10-09 | Stop reason: HOSPADM

## 2023-10-07 RX ORDER — ACETAMINOPHEN 500 MG
1000 TABLET ORAL EVERY 8 HOURS PRN
Status: DISCONTINUED | OUTPATIENT
Start: 2023-10-07 | End: 2023-10-09 | Stop reason: HOSPADM

## 2023-10-07 RX ORDER — ENOXAPARIN SODIUM 100 MG/ML
40 INJECTION SUBCUTANEOUS EVERY 24 HOURS
Status: DISCONTINUED | OUTPATIENT
Start: 2023-10-07 | End: 2023-10-09 | Stop reason: HOSPADM

## 2023-10-07 RX ORDER — SODIUM CHLORIDE 0.9 % (FLUSH) 0.9 %
10 SYRINGE (ML) INJECTION EVERY 6 HOURS PRN
Status: DISCONTINUED | OUTPATIENT
Start: 2023-10-07 | End: 2023-10-09 | Stop reason: HOSPADM

## 2023-10-07 RX ORDER — TAMSULOSIN HYDROCHLORIDE 0.4 MG/1
1 CAPSULE ORAL NIGHTLY
Status: DISCONTINUED | OUTPATIENT
Start: 2023-10-07 | End: 2023-10-09 | Stop reason: HOSPADM

## 2023-10-07 RX ORDER — TALC
6 POWDER (GRAM) TOPICAL NIGHTLY PRN
Status: DISCONTINUED | OUTPATIENT
Start: 2023-10-07 | End: 2023-10-09 | Stop reason: HOSPADM

## 2023-10-07 RX ORDER — FERROUS SULFATE, DRIED 160(50) MG
1 TABLET, EXTENDED RELEASE ORAL NIGHTLY
Status: DISCONTINUED | OUTPATIENT
Start: 2023-10-07 | End: 2023-10-07

## 2023-10-07 RX ORDER — SULFASALAZINE 500 MG/1
1000 TABLET ORAL 2 TIMES DAILY
Status: DISCONTINUED | OUTPATIENT
Start: 2023-10-07 | End: 2023-10-09 | Stop reason: HOSPADM

## 2023-10-07 RX ORDER — TIZANIDINE 2 MG/1
2 TABLET ORAL EVERY 6 HOURS PRN
Status: DISCONTINUED | OUTPATIENT
Start: 2023-10-07 | End: 2023-10-09 | Stop reason: HOSPADM

## 2023-10-07 RX ORDER — THIAMINE HCL 100 MG
100 TABLET ORAL NIGHTLY
Status: DISCONTINUED | OUTPATIENT
Start: 2023-10-07 | End: 2023-10-09 | Stop reason: HOSPADM

## 2023-10-07 RX ORDER — OXYCODONE HYDROCHLORIDE 5 MG/1
5 TABLET ORAL EVERY 6 HOURS PRN
Status: DISCONTINUED | OUTPATIENT
Start: 2023-10-07 | End: 2023-10-09 | Stop reason: HOSPADM

## 2023-10-07 RX ORDER — MAGNESIUM SULFATE HEPTAHYDRATE 40 MG/ML
2 INJECTION, SOLUTION INTRAVENOUS ONCE
Status: COMPLETED | OUTPATIENT
Start: 2023-10-07 | End: 2023-10-07

## 2023-10-07 RX ORDER — AMOXICILLIN 250 MG
1 CAPSULE ORAL 2 TIMES DAILY
Status: DISCONTINUED | OUTPATIENT
Start: 2023-10-07 | End: 2023-10-09 | Stop reason: HOSPADM

## 2023-10-07 RX ORDER — PROCHLORPERAZINE EDISYLATE 5 MG/ML
5 INJECTION INTRAMUSCULAR; INTRAVENOUS EVERY 6 HOURS PRN
Status: DISCONTINUED | OUTPATIENT
Start: 2023-10-07 | End: 2023-10-09 | Stop reason: HOSPADM

## 2023-10-07 RX ORDER — RIFAMPIN 300 MG/1
600 CAPSULE ORAL DAILY
Status: DISCONTINUED | OUTPATIENT
Start: 2023-10-07 | End: 2023-10-09 | Stop reason: HOSPADM

## 2023-10-07 RX ORDER — CALCIUM CARBONATE 200(500)MG
500 TABLET,CHEWABLE ORAL 3 TIMES DAILY PRN
Status: DISCONTINUED | OUTPATIENT
Start: 2023-10-07 | End: 2023-10-08

## 2023-10-07 RX ORDER — ONDANSETRON 2 MG/ML
4 INJECTION INTRAMUSCULAR; INTRAVENOUS EVERY 8 HOURS PRN
Status: DISCONTINUED | OUTPATIENT
Start: 2023-10-07 | End: 2023-10-09 | Stop reason: HOSPADM

## 2023-10-07 RX ORDER — PREDNISONE 5 MG/1
10 TABLET ORAL
Status: DISCONTINUED | OUTPATIENT
Start: 2023-10-07 | End: 2023-10-09 | Stop reason: HOSPADM

## 2023-10-07 RX ORDER — AMMONIUM LACTATE 12 G/100G
LOTION TOPICAL 2 TIMES DAILY PRN
Status: DISCONTINUED | OUTPATIENT
Start: 2023-10-07 | End: 2023-10-09 | Stop reason: HOSPADM

## 2023-10-07 RX ORDER — NALOXONE HCL 0.4 MG/ML
0.02 VIAL (ML) INJECTION
Status: DISCONTINUED | OUTPATIENT
Start: 2023-10-07 | End: 2023-10-09 | Stop reason: HOSPADM

## 2023-10-07 RX ORDER — FOLIC ACID 1 MG/1
1 TABLET ORAL DAILY
Status: DISCONTINUED | OUTPATIENT
Start: 2023-10-07 | End: 2023-10-09 | Stop reason: HOSPADM

## 2023-10-07 RX ORDER — DICLOFENAC SODIUM 10 MG/G
2 GEL TOPICAL 4 TIMES DAILY PRN
Status: DISCONTINUED | OUTPATIENT
Start: 2023-10-07 | End: 2023-10-09 | Stop reason: HOSPADM

## 2023-10-07 RX ORDER — HYDROXYCHLOROQUINE SULFATE 100 MG/1
300 TABLET ORAL DAILY
Status: DISCONTINUED | OUTPATIENT
Start: 2023-10-07 | End: 2023-10-08

## 2023-10-07 RX ORDER — DEXTROSE MONOHYDRATE AND SODIUM CHLORIDE 5; .9 G/100ML; G/100ML
INJECTION, SOLUTION INTRAVENOUS CONTINUOUS
Status: DISCONTINUED | OUTPATIENT
Start: 2023-10-07 | End: 2023-10-08

## 2023-10-07 RX ORDER — FAMOTIDINE 20 MG/1
20 TABLET, FILM COATED ORAL 2 TIMES DAILY
Status: DISCONTINUED | OUTPATIENT
Start: 2023-10-07 | End: 2023-10-09 | Stop reason: HOSPADM

## 2023-10-07 RX ORDER — ACETAMINOPHEN 325 MG/1
975 TABLET ORAL EVERY 6 HOURS PRN
COMMUNITY

## 2023-10-07 RX ORDER — MIRTAZAPINE 15 MG/1
15 TABLET, FILM COATED ORAL NIGHTLY
Status: DISCONTINUED | OUTPATIENT
Start: 2023-10-07 | End: 2023-10-09 | Stop reason: HOSPADM

## 2023-10-07 RX ORDER — TALC
6 POWDER (GRAM) TOPICAL NIGHTLY
Status: DISCONTINUED | OUTPATIENT
Start: 2023-10-07 | End: 2023-10-07

## 2023-10-07 RX ADMIN — THIAMINE HCL TAB 100 MG 100 MG: 100 TAB at 09:10

## 2023-10-07 RX ADMIN — FAMOTIDINE 20 MG: 20 TABLET ORAL at 08:10

## 2023-10-07 RX ADMIN — FAMOTIDINE 20 MG: 20 TABLET ORAL at 09:10

## 2023-10-07 RX ADMIN — CHLORHEXIDINE GLUCONATE 0.12% ORAL RINSE 15 ML: 1.2 LIQUID ORAL at 08:10

## 2023-10-07 RX ADMIN — MIRTAZAPINE 15 MG: 15 TABLET, FILM COATED ORAL at 09:10

## 2023-10-07 RX ADMIN — SENNOSIDES AND DOCUSATE SODIUM 1 TABLET: 50; 8.6 TABLET ORAL at 09:10

## 2023-10-07 RX ADMIN — RIFAMPIN 600 MG: 300 CAPSULE ORAL at 08:10

## 2023-10-07 RX ADMIN — PREDNISONE 10 MG: 5 TABLET ORAL at 06:10

## 2023-10-07 RX ADMIN — MAGNESIUM SULFATE HEPTAHYDRATE 2 G: 40 INJECTION, SOLUTION INTRAVENOUS at 06:10

## 2023-10-07 RX ADMIN — SULFASALAZINE 1000 MG: 500 TABLET ORAL at 08:10

## 2023-10-07 RX ADMIN — CEFTRIAXONE 2 G: 2 INJECTION, POWDER, FOR SOLUTION INTRAMUSCULAR; INTRAVENOUS at 09:10

## 2023-10-07 RX ADMIN — FOLIC ACID 1 MG: 1 TABLET ORAL at 08:10

## 2023-10-07 RX ADMIN — ENOXAPARIN SODIUM 40 MG: 40 INJECTION SUBCUTANEOUS at 06:10

## 2023-10-07 RX ADMIN — THERA TABS 1 TABLET: TAB at 08:10

## 2023-10-07 RX ADMIN — DEXTROSE AND SODIUM CHLORIDE: 5; 900 INJECTION, SOLUTION INTRAVENOUS at 05:10

## 2023-10-07 RX ADMIN — SENNOSIDES AND DOCUSATE SODIUM 1 TABLET: 50; 8.6 TABLET ORAL at 08:10

## 2023-10-07 RX ADMIN — TAMSULOSIN HYDROCHLORIDE 0.4 MG: 0.4 CAPSULE ORAL at 09:10

## 2023-10-07 RX ADMIN — POTASSIUM BICARBONATE 40 MEQ: 391 TABLET, EFFERVESCENT ORAL at 06:10

## 2023-10-07 RX ADMIN — SULFASALAZINE 1000 MG: 500 TABLET ORAL at 09:10

## 2023-10-07 RX ADMIN — POLYETHYLENE GLYCOL 3350 17 G: 17 POWDER, FOR SOLUTION ORAL at 08:10

## 2023-10-07 RX ADMIN — CHLORHEXIDINE GLUCONATE 0.12% ORAL RINSE 15 ML: 1.2 LIQUID ORAL at 09:10

## 2023-10-07 NOTE — ASSESSMENT & PLAN NOTE
-continue his home medications  He takes MTX injection qSundays - discuss with pharmacy if that is available or if any oral dosage can substitute  F[facility]u with [facility] as outpatient for ABATACEPT infusions.

## 2023-10-07 NOTE — ED TRIAGE NOTES
Chirag Beckham, a 63 y.o. male presents to the ED w/ complaint of Per [facility] patient has had decreased PO intake.  Patient states he is hungry and thirsty but is having trouble eating.    Triage note:  Chief Complaint   Patient presents with    Failure To Thrive     From [facility]. Nurse states patient no eating or drinking     Review of patient's allergies indicates:  Not on File  No past medical history on file.

## 2023-10-07 NOTE — ASSESSMENT & PLAN NOTE
-patient with malfunctioning disla catheter - noted to have >700cc on bladder scan and bladder distension pain, he is very tachycardic despite 2L of IV fluids  - suspect pain/discomfort is driving this     Replace disla catheter  - from available data suspect it was placed during the recent [facility] hospitalization on 09/15, nursing home had target date 10/15 for disla replacement     Send UA with reflex culture

## 2023-10-07 NOTE — ASSESSMENT & PLAN NOTE
Chronic, likely due to immobility and chronic calcium intake. Hold home calcium-vitamin D. Giving IV fluids.

## 2023-10-07 NOTE — ASSESSMENT & PLAN NOTE
"The patient has hypercalcemia that is currently uncontrolled. The patient has the following symptoms due to their hypercalcemia: abdominal pain, constipation and weakness. The hypercalcemia is likely due to immobility, severe dehydration. We will obtain the following labs to work up the hypercalcemia: PTH No results found for: "PTH" . We will treat the hypercalcemia with: IV fluids ordered at a rate of 200 ml/hr. Their latest calcium has been reviewed and is listed below.  No results found for: "CAION"     -trend chemistry   "

## 2023-10-07 NOTE — NURSING
Nurses Note -- 4 Eyes      10/7/2023   6:33 AM      Skin assessed during: Admit      [] No Altered Skin Integrity Present    []Prevention Measures Documented      [x] Yes- Altered Skin Integrity Present or Discovered   [] LDA Added if Not in Epic (Describe Wound)   [] New Altered Skin Integrity was Present on Admit and Documented in LDA   [] Wound Image Taken    Wound Care Consulted? Yes    Attending Nurse:  Zoila Victoria RN/Staff Member:     Queenie

## 2023-10-07 NOTE — PLAN OF CARE
[facility] - Intensive Care ([place] [address])  Initial Discharge Assessment       Primary Care Provider: Miguel Gardner MD    Admission Diagnosis: Hypercalcemia [E83.52]  Tachycardia [R00.0]  Failure to thrive in adult [R62.7]    Admission Date: 10/6/2023  Expected Discharge Date:     Transition of Care Barriers: Transportation    Payor: MEDICAID / Plan: [facility] COMMUNITY PLAN [facility] [place] [employer] ([place] MEDICAID[place] / Product Type: Managed Medicaid /     Extended Emergency Contact Information  Primary Emergency Contact: Jeanine Beckham  Home Phone: [phone]  Mobile Phone: [phone]  Relation: Sister  Secondary Emergency Contact: Ericka Beckham  Home Phone: [phone]  Relation: Sister    Discharge Plan A: Home  Discharge Plan B: Home with family      [facility] [address] at [place], [place] - [address] [facility]  [address] [facility]  [address]  [facility] [address]  Phone: [phone] Fax: [phone]    SW met with patient at bedside to complete discharge planning assessment.  Patient alert and oriented xs 4.  Patient verified all demographic information on facesheet is correct.  Patient verified PCP is Dr. Gardner.  Patient verified primary health insurance is [facility] Medicaid.  Patient with NO home health or DME.  Patient with NO POA or Living Will.  Patient not on dialysis or medication coumadin.  Patient with no 30 day admission.  Patient with no financial issues at this time.  Patient WILL NEED transportation upon discharge from facility.  Patient independent with ADLs, live alone with family assistance. Patient states he uses public transportation to get to and from medical appointments.      Initial Assessment (most recent)       Adult Discharge Assessment - 10/07/23 1458          Discharge Assessment    Assessment Type Discharge Planning Assessment     Confirmed/corrected address, phone number and insurance Yes     Confirmed Demographics Correct on Facesheet     Source of Information patient      Communicated YUDELKA with patient/caregiver Date not available/Unable to determine     People in Home alone     Facility Arrived From: home     Do you expect to return to your current living situation? Yes     Do you have help at home or someone to help you manage your care at home? Yes     Who are your caregiver(s) and their phone number(s)? family     Prior to hospitilization cognitive status: Alert/Oriented     Current cognitive status: Alert/Oriented     Equipment Currently Used at Home none     Readmission within 30 days? No     Patient currently being followed by outpatient case management? No     Do you currently have service(s) that help you manage your care at home? No     Do you take prescription medications? Yes     Do you have prescription coverage? Yes     Do you have any problems affording any of your prescribed medications? No     Is the patient taking medications as prescribed? yes     Who is going to help you get home at discharge? family     How do you get to doctors appointments? public transportation;health plan transportation     Are you on dialysis? No     Do you take coumadin? No     DME Needed Upon Discharge  none     Discharge Plan discussed with: Patient     Transition of Care Barriers Transportation     Discharge Plan A Home     Discharge Plan B Home with family        Physical Activity    On average, how many days per week do you engage in moderate to strenuous exercise (like a brisk walk)? Patient refused     On average, how many minutes do you engage in exercise at this level? Patient refused        Financial Resource Strain    How hard is it for you to pay for the very basics like food, housing, medical care, and heating? Patient refused        Housing Stability    In the last 12 months, was there a time when you were not able to pay the mortgage or rent on time? Patient refused     In the last 12 months, was there a time when you did not have a steady place to sleep or slept in a shelter  (including now)? Patient refused        Transportation Needs    In the past 12 months, has lack of transportation kept you from medical appointments or from getting medications? Patient refused     In the past 12 months, has lack of transportation kept you from meetings, work, or from getting things needed for daily living? Patient refused        Food Insecurity    Within the past 12 months, you worried that your food would run out before you got the money to buy more. Patient refused     Within the past 12 months, the food you bought just didn't last and you didn't have money to get more. Patient refused        Stress    Do you feel stress - tense, restless, nervous, or anxious, or unable to sleep at night because your mind is troubled all the time - these days? Patient refused        Social Connections    In a typical week, how many times do you talk on the phone with family, friends, or neighbors? Patient refused     How often do you get together with friends or relatives? Patient refused     How often do you attend Restorationist or Jehovah's witness services? Patient refused     Do you belong to any clubs or organizations such as Restorationist groups, unions, fraternal or athletic groups, or school groups? Patient refused     How often do you attend meetings of the clubs or organizations you belong to? Patient refused     Are you , , , , never , or living with a partner? Patient refused        Alcohol Use    Q1: How often do you have a drink containing alcohol? Patient refused     Q2: How many drinks containing alcohol do you have on a typical day when you are drinking? Patient refused     Q3: How often do you have six or more drinks on one occasion? Patient refused

## 2023-10-07 NOTE — CARE UPDATE
Ua with pyuria, hematuria and proteinuria    Add on prot/cr Ur and urine gram stain    Start ceftriaxone.

## 2023-10-07 NOTE — ED NOTES
Telemetry Verification   Patient placed on Telemetry Box  Verified with War Room  Box # [id number]   Monitor Tech War room   Rate 107   Rhythm Sinus tach

## 2023-10-07 NOTE — ASSESSMENT & PLAN NOTE
Continue home hydroxychloroquine, sulfasalazine, folic acid. Should hold home methotrexate during infection. He will be getting abatacept infusions outpatient.

## 2023-10-07 NOTE — PROGRESS NOTES
[facility] - Intensive Care ([address] Medicine  Progress Note    Patient Name: Chirag Beckham  MRN: [id number]  Patient Class: IP- Inpatient   Admission Date: 10/6/2023  Length of Stay: 1 days  Attending Physician: Fredrick Carbajal MD  Primary Care Provider: Miguel Gardner MD        Subjective:     Principal Problem:Urinary tract infection associated with indwelling urethral catheter        HPI:  Chirag Beckham is a 63 year old Black man with smoking, disabling rheumatoid arthritis, hypercalcemia since 8/28/2023, iron deficiency anemia, prediabetes, benign prostatic hyperplasia with urinary retention with chronic indwelling Fernandez catheter, gastroesophageal reflux disease, insomnia, 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 He lives in [facility] in [place], [place]. His primary care physician is Dr. Miguel Gardner. He gets his medical care at [facility].   He was hospitalized at [facility] from 7/13/2023 to 7/19/2023 for rheumatoid arthritis flare, emphysema, and latent tuberculosis infection.   He was hospitalized at [facility] from 7/19/2023 to 8/8/2023 for atypical chest pain and sepsis.   He was hospitalized at [facility] from 8/18/2023 to 9/18/2023 for rheumatoid arthritis pain and pneumonia. He completed an antibiotic course for community acquired pneumonia. Infectious Disease recommended continuing rifampin for his Mycobacterium fortuitum. It was started on 7/20/2023 and will isacc on 11/20/2023. Rheumatology recommended daily low dose prednisone with calcium and vitamin D, folic acid, weekly subcutaneous methotrexate, hydroxychloroquine daily, sulfasalazine twice daily, and outpatient infusions of abatacept 750 mg on week 0, 2, 4 and every 4 weeks thereafter at [facility]. He endorsed depressed mood due to his medical conditions, immobility/bed bound status, and  nursing home placement. He was put on mirtazapine. Indwelling Fernandez catheter was placed. He was discharged to [facility] as a new assisted nursing home resident.   He was approved for abatacept on 10/4/2023.   He presented to [facility] Emergency [facility] on 109/6/2023 with poor oral intake. He had never been at an [facility] hospital previously but medical records from [facility] were available to review. He was moaning and complaining of bladder pain. He commented that his Fernandez catheter needed to be replaced. He had suprapubic distension and tenderness. Labs showed calcium 12.9 mg/dL, increased from 11.1 on 9/18/2023, potassium 3.0 mmol/L, magnesium 1.6 mg/dL. He was given oxycodone, potassium bicarbonate, potassium chloride, 2 liters of normal saline. He was admitted to Hospital Medicine Team D.      Overview/Hospital Course:  He was put on 5% dextrose 0.9% saline infusion at 200 mL/hr. Urinalysis showed 2+ protein, >100 RBC/hpf, 57 WBC/hpf, and moderate bacteria. He was put on ceftriaxone.       Interval History: I let him know he was found to have a UTI.    Review of Systems   Constitutional:  Negative for chills and fever.   Neurological:  Negative for seizures and syncope.     Objective:     Vital Signs (Most Recent):  Temp: 97.9 °F (36.6 °C) (10/07/23 1134)  Pulse: 110 (10/07/23 1521)  Resp: 18 (10/07/23 1134)  BP: 106/74 (10/07/23 1134)  SpO2: 98 % (10/07/23 1134) Vital Signs (24h Range):  Temp:  [97.7 °F (36.5 °C)-98.5 °F (36.9 °C)] 97.9 °F (36.6 °C)  Pulse:  [104-130] 110  Resp:  [17-22] 18  SpO2:  [93 %-100 %] 98 %  BP: (106-124)/(68-79) 106/74     Weight: 81.6 kg (180 lb)  Body mass index is 25.83 kg/m².    Intake/Output Summary (Last 24 hours) at 10/7/2023 1544  Last data filed at 10/7/2023 0557  Gross per 24 hour   Intake 2011.67 ml   Output 811 ml   Net 1200.67 ml         Physical Exam  Vitals and nursing note reviewed.   Constitutional:        General: He is not in acute distress.     Appearance: He is well-developed. He is not diaphoretic.   Pulmonary:      Effort: Pulmonary effort is normal. No respiratory distress.   Genitourinary:     Penis: Normal.       Comments: Fernandez catheter with blood-tinged urine in bag  Musculoskeletal:         General: Deformity present.   Skin:     General: Skin is warm and dry.      Coloration: Skin is not jaundiced or pale.   Neurological:      Mental Status: He is alert and oriented to person, place, and time.      Motor: No tremor or seizure activity.   Psychiatric:         Attention and Perception: Attention normal.         Mood and Affect: Mood is depressed. Affect is blunt.         Behavior: Behavior is cooperative.             Significant Labs: All pertinent labs within the past 24 hours have been reviewed.    Significant Imaging: I have reviewed all pertinent imaging results/findings within the past 24 hours.  X-Ray Chest AP Portable 10/06/23: FINDINGS:   The lungs are hyperexpanded.  There are bullous emphysematous changes of the lung apices.  There is mild coarse chronic interstitial prominence.  There is no large focal consolidation.  The pleural spaces are clear.  The cardiac silhouette is unremarkable.  Osseous structures demonstrate degenerative changes.   Impression:  No acute abnormality.   Hyperexpanded lungs and emphysematous changes as above.       Assessment/Plan:      * Urinary tract infection associated with indwelling urethral catheter  Chronic indwelling Fernandez catheter  Giving ceftriaxone. Follow up urine culture results.    Nursing home resident  Return to [facility] after discharge.    Hypercalcemia  Chronic, likely due to immobility and chronic calcium intake. Hold home calcium-vitamin D. Giving IV fluids.    Hypokalemia  Hypomagnesemia  Give potassium and magnesium.     Severe protein-calorie malnutrition  Nutrition consult     Urinary retention due to benign prostatic  hyperplasia  Replaced Fernandez catheter. Continue home tamsulosin.    Rheumatoid arthritis involving multiple sites with positive rheumatoid factor  Continue home hydroxychloroquine, sulfasalazine, folic acid. Should hold home methotrexate during infection. He will be getting abatacept infusions outpatient.     Primary insomnia  Continue home mirtazapine.    Physical deconditioning  Reportedly bed bound.    Latent tuberculosis  Continue home rifampin until 11/20/2023.    Iron deficiency anemia  Iron studies suggest anemia of chronic disease.    Constipation  Likely due to hypercalcemia. Giving senna-docusate and polyethylene glycol.    Gastroesophageal reflux disease  Continue home Tums PRN.      VTE Risk Mitigation (From admission, onward)         Ordered     enoxaparin injection 40 mg  Daily         10/07/23 0352     IP VTE HIGH RISK PATIENT  Once         10/07/23 0352     Place sequential compression device  Until discontinued         10/07/23 0352                Discharge Planning   YUDELKA:      Code Status: Full Code   Is the patient medically ready for discharge?: No    Reason for patient still in hospital (select all that apply): Patient trending condition and Treatment  Discharge Plan A: Home                  Fredrick Carbajal MD  Department of Hospital Medicine   [facility] - Intensive Care ([place])

## 2023-10-07 NOTE — SUBJECTIVE & OBJECTIVE
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 NOTE: AFB from 7/17/2023 grew M. fortuitum. AFB smear was negative and all other smears and cultures have been negative to date. This organism is common in water and soil and represents either a transient infection or contamination (most likely the latter given no other evidence of it in sputum) and is not clinically significant.    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 diagnosed July 2023 Mostly in hands, wrists; and (to a lesser degree) in feet and ankles    Tobacco user 9/22/2023    Urinary retention due to benign prostatic hyperplasia 9/22/2023       No past surgical history on file.    Review of patient's allergies indicates:  Not on File    No current facility-administered medications on file prior to encounter.     Current Outpatient Medications on File Prior to Encounter   Medication Sig    calcium carbonate (TUMS) 200 mg calcium (500 mg) chewable tablet Take 500 mg by mouth 3 (three) times daily as needed for Heartburn.    calcium-vitamin D3 (OS-DAMIEN 500 + D3) 500 mg-5 mcg (200 unit) per tablet Take 1 tablet by mouth every evening.    diclofenac sodium (VOLTAREN) 1 % Gel Apply 2 g topically 4 (four) times daily as needed.    melatonin (MELATIN) 5 mg Take 5 mg by mouth nightly as needed for Insomnia.    methotrexate, PF, 12.5 mg/0.5 mL Syrg Inject 25 mg into the skin every 7 days.    multivitamin with folic acid 400 mcg Tab Take 1 tablet by mouth once daily.    polyethylene glycol (GLYCOLAX) 17 gram PwPk Take 1 packet by mouth  once daily.    predniSONE (DELTASONE) 10 MG tablet TAKE 3 TABLETS BY MOUTH DAILY FOR 14 DAYS THEN TAKE 2 TABLETS BY MOUTH DAILY FOR 14 DAYS    thiamine 100 MG tablet Take 1 tablet by mouth every evening.    famotidine (PEPCID) 20 MG tablet Take 20 mg by mouth 2 (two) times daily.    folic acid (FOLVITE) 1 MG tablet Take 1,000 mcg by mouth once daily.    hydroxychloroquine (PLAQUENIL) 200 mg tablet Take 300 mg by mouth once daily.    methotrexate 2.5 MG Tab Take 15 mg by mouth every 7 days.    mirtazapine (REMERON) 15 MG tablet Take 15 mg by mouth every evening.    nicotine (NICODERM CQ) 14 mg/24 hr 1 patch.    oxyCODONE (ROXICODONE) 5 MG immediate release tablet Take by mouth.    predniSONE (DELTASONE) 10 MG tablet Take 10 mg by mouth once daily.    rifAMpin (RIFADIN) 300 MG capsule Take by mouth.    sulfaSALAzine (AZULFIDINE) 500 mg Tab Take 1,000 mg by mouth 2 (two) times daily.    tamsulosin (FLOMAX) 0.4 mg Cap Take 1 capsule by mouth once daily.    tiZANidine (ZANAFLEX) 2 MG tablet Take by mouth.     Family History    None       Tobacco Use    Smoking status: Not on file    Smokeless tobacco: Not on file   Substance and Sexual Activity    Alcohol use: Not on file    Drug use: Not on file    Sexual activity: Not on file     Review of Systems   Constitutional:  Positive for activity change. Negative for chills and fever.   Respiratory:  Negative for cough and shortness of breath.    Cardiovascular:  Negative for chest pain and leg swelling.   Genitourinary:  Positive for difficulty urinating and urgency.        Suprapubic pain   Neurological:  Positive for weakness.   Psychiatric/Behavioral:  Positive for dysphoric mood.      Objective:     Vital Signs (Most Recent):  Temp: 98.5 °F (36.9 °C) (10/07/23 0020)  Pulse: (!) 127 (10/07/23 0302)  Resp: 18 (10/07/23 0020)  BP: 112/72 (10/07/23 0020)  SpO2: 98 % (10/07/23 0020) Vital Signs (24h Range):  Temp:  [97.7 °F (36.5 °C)-98.5 °F (36.9 °C)] 98.5 °F (36.9 °C)  Pulse:   [113-130] 127  Resp:  [18-22] 18  SpO2:  [95 %-100 %] 98 %  BP: (110-124)/(68-79) 112/72     Weight: 81.6 kg (180 lb)  Body mass index is 25.83 kg/m².     Physical Exam  Vitals and nursing note reviewed.   Constitutional:       General: He is not in acute distress.     Appearance: He is ill-appearing. He is not toxic-appearing.   HENT:      Head: Normocephalic and atraumatic.      Mouth/Throat:      Mouth: Mucous membranes are dry.      Pharynx: Oropharynx is clear. No oropharyngeal exudate or posterior oropharyngeal erythema.   Eyes:      General: No scleral icterus.     Pupils: Pupils are equal, round, and reactive to light.   Cardiovascular:      Rate and Rhythm: Regular rhythm. Tachycardia present.      Pulses: Normal pulses.   Pulmonary:      Effort: Pulmonary effort is normal. No respiratory distress.   Abdominal:      General: There is no distension.      Palpations: Abdomen is soft.      Comments: Suprapubic swelling   Genitourinary:     Penis: Normal.       Comments: Fernandez catheter in place  Musculoskeletal:         General: Tenderness present.      Cervical back: Neck supple.      Right lower leg: No edema.      Left lower leg: No edema.      Comments: Severe muscle wastin/atrophy of extremities   Skin:     General: Skin is warm.      Comments: Severely Dry hypertrophic skin   Neurological:      Mental Status: He is alert. He is disoriented.   Psychiatric:         Mood and Affect: Mood is anxious.              CRANIAL NERVES     CN III, IV, VI   Pupils are equal, round, and reactive to light.       Significant Labs: All pertinent labs within the past 24 hours have been reviewed.  CBC:   Recent Labs   Lab 10/06/23  1920   WBC 6.73   HGB 7.8*   HCT 26.7*        CMP:   Recent Labs   Lab 10/06/23  1920      K 3.0*      CO2 30*   GLU 92   BUN 11   CREATININE 0.6   CALCIUM 12.9*   PROT 8.5*   ALBUMIN 2.1*   BILITOT 0.4   ALKPHOS 102   AST 12   ALT 11   ANIONGAP 11     Cardiac Markers: No  "results for input(s): "CKMB", "MYOGLOBIN", "BNP", "TROPISTAT" in the last 48 hours.  Coagulation: No results for input(s): "PT", "INR", "APTT" in the last 48 hours.  Lactic Acid: No results for input(s): "LACTATE" in the last 48 hours.  Lipase: No results for input(s): "LIPASE" in the last 48 hours.  Magnesium:   Recent Labs   Lab 10/06/23  1920   MG 1.6     Prealbumin: No results for input(s): "PREALBUMIN" in the last 48 hours.  Troponin:   Recent Labs   Lab 10/06/23  1920   TROPONINI 0.011     Urine Studies: No results for input(s): "COLORU", "APPEARANCEUA", "PHUR", "SPECGRAV", "PROTEINUA", "GLUCUA", "KETONESU", "BILIRUBINUA", "OCCULTUA", "NITRITE", "UROBILINOGEN", "LEUKOCYTESUR", "RBCUA", "WBCUA", "BACTERIA", "SQUAMEPITHEL", "HYALINECASTS" in the last 48 hours.    Invalid input(s): "WRIGHTSUR"    Significant Imaging: I have reviewed all pertinent imaging results/findings within the past 24 hours.    XR CHEST AP PORTABLE     CLINICAL HISTORY:  Tachycardia, unspecified     TECHNIQUE:  Single frontal view of the chest was performed.     COMPARISON:  None     FINDINGS:  The lungs are hyperexpanded.  There are bullous emphysematous changes of the lung apices.  There is mild coarse chronic interstitial prominence.  There is no large focal consolidation.  The pleural spaces are clear.  The cardiac silhouette is unremarkable.  Osseous structures demonstrate degenerative changes.     Impression:     No acute abnormality.     Hyperexpanded lungs and emphysematous changes as above.        Electronically signed by: Chetan Romo  Date:                                            10/06/2023  Time:                                           19:44           Exam Ended: 10/06/23 19:35 Last Resulted: 10/06/23 19:44              "

## 2023-10-07 NOTE — SUBJECTIVE & OBJECTIVE
Interval History: I let him know he was found to have a UTI.    Review of Systems   Constitutional:  Negative for chills and fever.   Neurological:  Negative for seizures and syncope.     Objective:     Vital Signs (Most Recent):  Temp: 97.9 °F (36.6 °C) (10/07/23 1134)  Pulse: 110 (10/07/23 1521)  Resp: 18 (10/07/23 1134)  BP: 106/74 (10/07/23 1134)  SpO2: 98 % (10/07/23 1134) Vital Signs (24h Range):  Temp:  [97.7 °F (36.5 °C)-98.5 °F (36.9 °C)] 97.9 °F (36.6 °C)  Pulse:  [104-130] 110  Resp:  [17-22] 18  SpO2:  [93 %-100 %] 98 %  BP: (106-124)/(68-79) 106/74     Weight: 81.6 kg (180 lb)  Body mass index is 25.83 kg/m².    Intake/Output Summary (Last 24 hours) at 10/7/2023 1544  Last data filed at 10/7/2023 0557  Gross per 24 hour   Intake 2011.67 ml   Output 811 ml   Net 1200.67 ml         Physical Exam  Vitals and nursing note reviewed.   Constitutional:       General: He is not in acute distress.     Appearance: He is well-developed. He is not diaphoretic.   Pulmonary:      Effort: Pulmonary effort is normal. No respiratory distress.   Genitourinary:     Penis: Normal.       Comments: Fernandez catheter with blood-tinged urine in bag  Musculoskeletal:         General: Deformity present.   Skin:     General: Skin is warm and dry.      Coloration: Skin is not jaundiced or pale.   Neurological:      Mental Status: He is alert and oriented to person, place, and time.      Motor: No tremor or seizure activity.   Psychiatric:         Attention and Perception: Attention normal.         Mood and Affect: Mood is depressed. Affect is blunt.         Behavior: Behavior is cooperative.             Significant Labs: All pertinent labs within the past 24 hours have been reviewed.    Significant Imaging: I have reviewed all pertinent imaging results/findings within the past 24 hours.  X-Ray Chest AP Portable 10/06/23: FINDINGS:   The lungs are hyperexpanded.  There are bullous emphysematous changes of the lung apices.  There is  mild coarse chronic interstitial prominence.  There is no large focal consolidation.  The pleural spaces are clear.  The cardiac silhouette is unremarkable.  Osseous structures demonstrate degenerative changes.   Impression:  No acute abnormality.   Hyperexpanded lungs and emphysematous changes as above.

## 2023-10-07 NOTE — ASSESSMENT & PLAN NOTE
"Per [facility] notes "FB from 7/17/2023 grew M. fortuitum. AFB smear was negative and all other smears and cultures have been negative to date. This organism is common in water and soil and represents either a transient infection or contamination (most likely the latter given no other evidence of it in sputum) and is not clinically significant.:"    Recent admit most recent plan from ID re: potential history of Latent TB.   Latent TB  #Lymphadenopathy - Positive T-spot: Ordered as w/u to start biologic agent for RA. On 7/17 patient had positive AFB with smear cx demonstrating unidentified mycobacterial species: AFB smear resulted now as Mycobacterium Fortuitum, no changes in treatment plan per ID  - AFB sputum negative x 3 last admit in July. Repeat AFB x 3 this admission negative  - ID consulted, appreciate recs:                  - Flow cytometry negative             - ID confirmed restarting methotrexate 25 mg weekly              - Excisional LN Biopsy 8/24, as stated above. Surgical path: AFB, and fungal stains negative; findings c/w reactive presentation  - See ID note 9/8. Consulted Trauma Surgery for LN biopsy done 9/12. Attending discussed with ID and pathology to ensure appropriate lab orders are in for procedure.  -LN cx collected 9/12 with one colony yeast, candida parapsilosis. Discussed with ID Attending, no treatment indicated    Discharge summary includes rifampin to be given with an end of treatment date of 11/20/23    "

## 2023-10-07 NOTE — HOSPITAL COURSE
He was put on 5% dextrose 0.9% saline infusion at 200 mL/hr. Urinalysis showed 2+ protein, >100 RBC/hpf, 57 WBC/hpf, and moderate bacteria. He was put on ceftriaxone. PTH was normal. His hypercalcemia is likely due to immobility and taking calcium carbonate as needed and calcium-vitamin D daily, both of which he is prescribed. Calcium improved to 10.1. Urine culture had no growth. He was prescribed 5 days of cephalexin. Calcium carbonate and calcium-vitamin D were discontinued.

## 2023-10-07 NOTE — H&P
"[facility] - Intensive Care ([address] Medicine  History & Physical    Patient Name: Chirag Beckham  MRN: [id number]  Patient Class: IP- Inpatient  Admission Date: 10/6/2023  Attending Physician: Fredrick Carbajal MD [facility] MED D  Admitting Physician: Pierre Vicente MD    Primary Care Provider: Miguel Gardner MD      Patient information was obtained from patient, past medical records and ER records.     Subjective:     Principal Problem:Hypercalcemia    Chief Complaint:   Chief Complaint   Patient presents with    Failure To Thrive     From [facility]. Nurse states patient no eating or drinking        HPI: 64 y/o with Rheumatoid arthritis, Physical debility, Severe Malnutrition, Urinary retention, insomnia, chronic constipation presents from nursing home ([facility])    Patient sent to the ED for concern of poor nutrition/oral intake per ED notes.  Nursing home transfer summary does not have more details.  Patient has not been in [facility].       Care everywhere review - admitted to [facility] from 8/18-9/18, initially presented with productive cough and fevers, concerning for sepsis due to pneumonia, completed CAP coverage.  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   He had lymph node biopsy with reported results of "with one colony yeast, candida parapsilosis. Discussed with ID Attending, no treatment indicated"  Rheumatology evaluated the patient and he was placed on weekly MTX SQ, HCQ, Sulfasalazine, process started to initiate ABATACEPT INFUSION.  He was noted to have depressed mood due to severe debility, being bed confined and ultimately required nursing home placement after this hospital stay, he had declined medications geared towards appetite stimulation.  Urinary retention developed a new onset problem and discharged with disla catheter and outpatient " f/u .     Care everywhere notes show Rheumatology clinic received approval for ABATACEPT infusion on 10/04/23.      On bedside evaluation patient is moaning and in moderate distress, he is complaining of bladder pain.  He is commenting that disla catheter was taken out and he needs it replaced.  Disla catheter is present at bedside, urine in container, he is restless and difficult to focus for interview, on examination he has very thin habitus but notable suprapubic distension, tenderness.  Concerning that disla catheter is obstructed or not functioning.       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 NOTE: AFB from 7/17/2023 grew M. fortuitum. AFB smear was negative and all other smears and cultures have been negative to date. This organism is common in water and soil and represents either a transient infection or contamination (most likely the latter given no other evidence of it in sputum) and is not clinically significant.    Physical deconditioning 9/18/2023    Polyarthritis 6/13/2023    Primary insomnia 9/22/2023    Rheumatoid arthritis involving multiple sites with positive rheumatoid factor 7/12/2023    Formatting of this note might be different from the original. diagnosed July 2023 Mostly in hands, wrists; and (to a lesser degree) in feet and ankles    Tobacco user 9/22/2023    Urinary retention due to benign prostatic hyperplasia 9/22/2023       No past surgical history on file.    Review of patient's allergies indicates:  Not on File    No current facility-administered medications on file prior to encounter.     Current Outpatient Medications on File Prior to Encounter   Medication Sig    calcium carbonate (TUMS) 200 mg calcium (500 mg) chewable tablet Take  500 mg by mouth 3 (three) times daily as needed for Heartburn.    calcium-vitamin D3 (OS-DAMIEN 500 + D3) 500 mg-5 mcg (200 unit) per tablet Take 1 tablet by mouth every evening.    diclofenac sodium (VOLTAREN) 1 % Gel Apply 2 g topically 4 (four) times daily as needed.    melatonin (MELATIN) 5 mg Take 5 mg by mouth nightly as needed for Insomnia.    methotrexate, PF, 12.5 mg/0.5 mL Syrg Inject 25 mg into the skin every 7 days.    multivitamin with folic acid 400 mcg Tab Take 1 tablet by mouth once daily.    polyethylene glycol (GLYCOLAX) 17 gram PwPk Take 1 packet by mouth once daily.    predniSONE (DELTASONE) 10 MG tablet TAKE 3 TABLETS BY MOUTH DAILY FOR 14 DAYS THEN TAKE 2 TABLETS BY MOUTH DAILY FOR 14 DAYS    thiamine 100 MG tablet Take 1 tablet by mouth every evening.    famotidine (PEPCID) 20 MG tablet Take 20 mg by mouth 2 (two) times daily.    folic acid (FOLVITE) 1 MG tablet Take 1,000 mcg by mouth once daily.    hydroxychloroquine (PLAQUENIL) 200 mg tablet Take 300 mg by mouth once daily.    methotrexate 2.5 MG Tab Take 15 mg by mouth every 7 days.    mirtazapine (REMERON) 15 MG tablet Take 15 mg by mouth every evening.    nicotine (NICODERM CQ) 14 mg/24 hr 1 patch.    oxyCODONE (ROXICODONE) 5 MG immediate release tablet Take by mouth.    predniSONE (DELTASONE) 10 MG tablet Take 10 mg by mouth once daily.    rifAMpin (RIFADIN) 300 MG capsule Take by mouth.    sulfaSALAzine (AZULFIDINE) 500 mg Tab Take 1,000 mg by mouth 2 (two) times daily.    tamsulosin (FLOMAX) 0.4 mg Cap Take 1 capsule by mouth once daily.    tiZANidine (ZANAFLEX) 2 MG tablet Take by mouth.     Family History    None       Tobacco Use    Smoking status: Not on file    Smokeless tobacco: Not on file   Substance and Sexual Activity    Alcohol use: Not on file    Drug use: Not on file    Sexual activity: Not on file     Review of Systems   Constitutional:  Positive for activity change. Negative for chills and fever.    Respiratory:  Negative for cough and shortness of breath.    Cardiovascular:  Negative for chest pain and leg swelling.   Genitourinary:  Positive for difficulty urinating and urgency.        Suprapubic pain   Neurological:  Positive for weakness.   Psychiatric/Behavioral:  Positive for dysphoric mood.      Objective:     Vital Signs (Most Recent):  Temp: 98.5 °F (36.9 °C) (10/07/23 0020)  Pulse: (!) 127 (10/07/23 0302)  Resp: 18 (10/07/23 0020)  BP: 112/72 (10/07/23 0020)  SpO2: 98 % (10/07/23 0020) Vital Signs (24h Range):  Temp:  [97.7 °F (36.5 °C)-98.5 °F (36.9 °C)] 98.5 °F (36.9 °C)  Pulse:  [113-130] 127  Resp:  [18-22] 18  SpO2:  [95 %-100 %] 98 %  BP: (110-124)/(68-79) 112/72     Weight: 81.6 kg (180 lb)  Body mass index is 25.83 kg/m².     Physical Exam  Vitals and nursing note reviewed.   Constitutional:       General: He is not in acute distress.     Appearance: He is ill-appearing. He is not toxic-appearing.   HENT:      Head: Normocephalic and atraumatic.      Mouth/Throat:      Mouth: Mucous membranes are dry.      Pharynx: Oropharynx is clear. No oropharyngeal exudate or posterior oropharyngeal erythema.   Eyes:      General: No scleral icterus.     Pupils: Pupils are equal, round, and reactive to light.   Cardiovascular:      Rate and Rhythm: Regular rhythm. Tachycardia present.      Pulses: Normal pulses.   Pulmonary:      Effort: Pulmonary effort is normal. No respiratory distress.   Abdominal:      General: There is no distension.      Palpations: Abdomen is soft.      Comments: Suprapubic swelling   Genitourinary:     Penis: Normal.       Comments: Fernandez catheter in place  Musculoskeletal:         General: Tenderness present.      Cervical back: Neck supple.      Right lower leg: No edema.      Left lower leg: No edema.      Comments: Severe muscle wastin/atrophy of extremities   Skin:     General: Skin is warm.      Comments: Severely Dry hypertrophic skin   Neurological:      Mental Status:  "He is alert. He is disoriented.   Psychiatric:         Mood and Affect: Mood is anxious.              CRANIAL NERVES     CN III, IV, VI   Pupils are equal, round, and reactive to light.       Significant Labs: All pertinent labs within the past 24 hours have been reviewed.  CBC:   Recent Labs   Lab 10/06/23  1920   WBC 6.73   HGB 7.8*   HCT 26.7*        CMP:   Recent Labs   Lab 10/06/23  1920      K 3.0*      CO2 30*   GLU 92   BUN 11   CREATININE 0.6   CALCIUM 12.9*   PROT 8.5*   ALBUMIN 2.1*   BILITOT 0.4   ALKPHOS 102   AST 12   ALT 11   ANIONGAP 11     Cardiac Markers: No results for input(s): "CKMB", "MYOGLOBIN", "BNP", "TROPISTAT" in the last 48 hours.  Coagulation: No results for input(s): "PT", "INR", "APTT" in the last 48 hours.  Lactic Acid: No results for input(s): "LACTATE" in the last 48 hours.  Lipase: No results for input(s): "LIPASE" in the last 48 hours.  Magnesium:   Recent Labs   Lab 10/06/23  1920   MG 1.6     Prealbumin: No results for input(s): "PREALBUMIN" in the last 48 hours.  Troponin:   Recent Labs   Lab 10/06/23  1920   TROPONINI 0.011     Urine Studies: No results for input(s): "COLORU", "APPEARANCEUA", "PHUR", "SPECGRAV", "PROTEINUA", "GLUCUA", "KETONESU", "BILIRUBINUA", "OCCULTUA", "NITRITE", "UROBILINOGEN", "LEUKOCYTESUR", "RBCUA", "WBCUA", "BACTERIA", "SQUAMEPITHEL", "HYALINECASTS" in the last 48 hours.    Invalid input(s): "WRIGHTSUR"    Significant Imaging: I have reviewed all pertinent imaging results/findings within the past 24 hours.    XR CHEST AP PORTABLE     CLINICAL HISTORY:  Tachycardia, unspecified     TECHNIQUE:  Single frontal view of the chest was performed.     COMPARISON:  None     FINDINGS:  The lungs are hyperexpanded.  There are bullous emphysematous changes of the lung apices.  There is mild coarse chronic interstitial prominence.  There is no large focal consolidation.  The pleural spaces are clear.  The cardiac silhouette is unremarkable.  " "Osseous structures demonstrate degenerative changes.     Impression:     No acute abnormality.     Hyperexpanded lungs and emphysematous changes as above.        Electronically signed by: Chetan Romo  Date:                                            10/06/2023  Time:                                           19:44           Exam Ended: 10/06/23 19:35 Last Resulted: 10/06/23 19:44              Assessment/Plan:     * Hypercalcemia  The patient has hypercalcemia that is currently uncontrolled. The patient has the following symptoms due to their hypercalcemia: abdominal pain, constipation and weakness. The hypercalcemia is likely due to immobility, severe dehydration. We will obtain the following labs to work up the hypercalcemia: PTH No results found for: "PTH" . We will treat the hypercalcemia with: IV fluids ordered at a rate of 200 ml/hr. Their latest calcium has been reviewed and is listed below.  No results found for: "CAION"     -trend chemistry     Hypokalemia  Supplement  Nutrition calories.       Rheumatoid arthritis involving multiple sites with positive rheumatoid factor  -continue his home medications  He takes MTX injection qSundays - discuss with pharmacy if that is available or if any oral dosage can substitute  F/u with [facility] as outpatient for ABATACEPT infusions.       Severe protein-calorie malnutrition  Nutrition consult   Severe muscle wasting   Send vitamin levels  Ammonia     Urinary retention due to benign prostatic hyperplasia  -patient with malfunctioning disla catheter - noted to have >700cc on bladder scan and bladder distension pain, he is very tachycardic despite 2L of IV fluids  - suspect pain/discomfort is driving this     Replace disla catheter  - from available data suspect it was placed during the recent [facility] hospitalization on 09/15, nursing home had target date 10/15 for disla replacement     Send UA with reflex culture        Physical deconditioning  Pt/ot - pts " "exact level of function unclear, but recent notes commend pt is bed confined       Constipation  tSH is high - free t4 is normal continue laxative/stool stimulants.       Iron deficiency anemia  Send iron studies and given IV iron if needed,       Primary insomnia  Continue mirtazapine      Latent tuberculosis  Per [facility] notes "FB from 7/17/2023 grew M. fortuitum. AFB smear was negative and all other smears and cultures have been negative to date. This organism is common in water and soil and represents either a transient infection or contamination (most likely the latter given no other evidence of it in sputum) and is not clinically significant.:"    Recent admit most recent plan from ID re: potential history of Latent TB.   Latent TB  #Lymphadenopathy - Positive T-spot: Ordered as w/u to start biologic agent for RA. On 7/17 patient had positive AFB with smear cx demonstrating unidentified mycobacterial species: AFB smear resulted now as Mycobacterium Fortuitum, no changes in treatment plan per ID  - AFB sputum negative x 3 last admit in July. Repeat AFB x 3 this admission negative  - ID consulted, appreciate recs:                  - Flow cytometry negative             - ID confirmed restarting methotrexate 25 mg weekly              - Excisional LN Biopsy 8/24, as stated above. Surgical path: AFB, and fungal stains negative; findings c/w reactive presentation  - See ID note 9/8. Consulted Trauma Surgery for LN biopsy done 9/12. Attending discussed with ID and pathology to ensure appropriate lab orders are in for procedure.  -LN cx collected 9/12 with one colony yeast, candida parapsilosis. Discussed with ID Attending, no treatment indicated    Discharge summary includes rifampin to be given with an end of treatment date of 11/20/23      Dyspepsia  Continue tums PRN        VTE Risk Mitigation (From admission, onward)         Ordered     enoxaparin injection 40 mg  Daily         10/07/23 0352     IP VTE HIGH RISK " PATIENT  Once         10/07/23 0352     Place sequential compression device  Until discontinued         10/07/23 0352                           Pierre Vicente MD  Department of Hospital Medicine  [facility] - Intensive Care ([address])

## 2023-10-08 LAB
ALBUMIN SERPL BCP-MCNC: 1.8 G/DL (ref 3.5–5.2)
ALP SERPL-CCNC: 85 U/L (ref 55–135)
ALT SERPL W/O P-5'-P-CCNC: 8 U/L (ref 10–44)
ANION GAP SERPL CALC-SCNC: 7 MMOL/L (ref 8–16)
AST SERPL-CCNC: 9 U/L (ref 10–40)
BACTERIA UR CULT: NO GROWTH
BILIRUB SERPL-MCNC: 0.3 MG/DL (ref 0.1–1)
BUN SERPL-MCNC: 5 MG/DL (ref 8–23)
CALCIUM SERPL-MCNC: 10.7 MG/DL (ref 8.7–10.5)
CHLORIDE SERPL-SCNC: 109 MMOL/L (ref 95–110)
CO2 SERPL-SCNC: 26 MMOL/L (ref 23–29)
CREAT SERPL-MCNC: 0.6 MG/DL (ref 0.5–1.4)
EST. GFR  (NO RACE VARIABLE): >60 ML/MIN/1.73 M^2
GLUCOSE SERPL-MCNC: 126 MG/DL (ref 70–110)
MAGNESIUM SERPL-MCNC: 1.5 MG/DL (ref 1.6–2.6)
PHOSPHATE SERPL-MCNC: 2.5 MG/DL (ref 2.7–4.5)
POTASSIUM SERPL-SCNC: 3 MMOL/L (ref 3.5–5.1)
PROT SERPL-MCNC: 7.2 G/DL (ref 6–8.4)
SODIUM SERPL-SCNC: 142 MMOL/L (ref 136–145)

## 2023-10-08 PROCEDURE — 36415 COLL VENOUS BLD VENIPUNCTURE: CPT | Performed by: HOSPITALIST

## 2023-10-08 PROCEDURE — 12000002 HC ACUTE/MED SURGE SEMI-PRIVATE ROOM

## 2023-10-08 PROCEDURE — 83735 ASSAY OF MAGNESIUM: CPT | Performed by: HOSPITALIST

## 2023-10-08 PROCEDURE — 25000003 PHARM REV CODE 250: Performed by: HOSPITALIST

## 2023-10-08 PROCEDURE — 99233 PR SUBSEQUENT HOSPITAL CARE,LEVL III: ICD-10-PCS | Mod: ,,, | Performed by: HOSPITALIST

## 2023-10-08 PROCEDURE — 84100 ASSAY OF PHOSPHORUS: CPT | Performed by: HOSPITALIST

## 2023-10-08 PROCEDURE — 80053 COMPREHEN METABOLIC PANEL: CPT | Performed by: HOSPITALIST

## 2023-10-08 PROCEDURE — 63600175 PHARM REV CODE 636 W HCPCS: Performed by: HOSPITALIST

## 2023-10-08 PROCEDURE — 99233 SBSQ HOSP IP/OBS HIGH 50: CPT | Mod: ,,, | Performed by: HOSPITALIST

## 2023-10-08 RX ORDER — CEPHALEXIN 500 MG/1
500 CAPSULE ORAL EVERY 12 HOURS
Status: DISCONTINUED | OUTPATIENT
Start: 2023-10-09 | End: 2023-10-09

## 2023-10-08 RX ORDER — MAGNESIUM SULFATE HEPTAHYDRATE 40 MG/ML
2 INJECTION, SOLUTION INTRAVENOUS ONCE
Status: COMPLETED | OUTPATIENT
Start: 2023-10-08 | End: 2023-10-08

## 2023-10-08 RX ORDER — ALUMINUM HYDROXIDE, MAGNESIUM HYDROXIDE, AND SIMETHICONE 2400; 240; 2400 MG/30ML; MG/30ML; MG/30ML
30 SUSPENSION ORAL EVERY 6 HOURS PRN
Status: DISCONTINUED | OUTPATIENT
Start: 2023-10-08 | End: 2023-10-09 | Stop reason: HOSPADM

## 2023-10-08 RX ADMIN — MIRTAZAPINE 15 MG: 15 TABLET, FILM COATED ORAL at 09:10

## 2023-10-08 RX ADMIN — MAGNESIUM SULFATE HEPTAHYDRATE 2 G: 40 INJECTION, SOLUTION INTRAVENOUS at 09:10

## 2023-10-08 RX ADMIN — SENNOSIDES AND DOCUSATE SODIUM 1 TABLET: 50; 8.6 TABLET ORAL at 09:10

## 2023-10-08 RX ADMIN — THERA TABS 1 TABLET: TAB at 09:10

## 2023-10-08 RX ADMIN — SULFASALAZINE 1000 MG: 500 TABLET ORAL at 09:10

## 2023-10-08 RX ADMIN — TAMSULOSIN HYDROCHLORIDE 0.4 MG: 0.4 CAPSULE ORAL at 09:10

## 2023-10-08 RX ADMIN — CHLORHEXIDINE GLUCONATE 0.12% ORAL RINSE 15 ML: 1.2 LIQUID ORAL at 09:10

## 2023-10-08 RX ADMIN — THIAMINE HCL TAB 100 MG 100 MG: 100 TAB at 09:10

## 2023-10-08 RX ADMIN — CEFTRIAXONE 2 G: 2 INJECTION, POWDER, FOR SOLUTION INTRAMUSCULAR; INTRAVENOUS at 09:10

## 2023-10-08 RX ADMIN — FAMOTIDINE 20 MG: 20 TABLET ORAL at 09:10

## 2023-10-08 RX ADMIN — POLYETHYLENE GLYCOL 3350 17 G: 17 POWDER, FOR SOLUTION ORAL at 09:10

## 2023-10-08 RX ADMIN — FOLIC ACID 1 MG: 1 TABLET ORAL at 09:10

## 2023-10-08 RX ADMIN — HYDROXYCHLOROQUINE SULFATE 300 MG: 200 TABLET, FILM COATED ORAL at 04:10

## 2023-10-08 RX ADMIN — POTASSIUM BICARBONATE 40 MEQ: 391 TABLET, EFFERVESCENT ORAL at 09:10

## 2023-10-08 RX ADMIN — RIFAMPIN 600 MG: 300 CAPSULE ORAL at 09:10

## 2023-10-08 RX ADMIN — PREDNISONE 10 MG: 5 TABLET ORAL at 06:10

## 2023-10-08 NOTE — ASSESSMENT & PLAN NOTE
Likely due to hypercalcemia. Giving senna-docusate and polyethylene glycol. Had a bowel movement last night.

## 2023-10-08 NOTE — PROGRESS NOTES
[facility] - Intensive Care ([address] Medicine  Progress Note    Patient Name: Chirag Beckham  MRN: [id number]  Patient Class: IP- Inpatient   Admission Date: 10/6/2023  Length of Stay: 2 days  Attending Physician: Fredrick Carbajal MD  Primary Care Provider: Miguel Gardner MD        Subjective:     Principal Problem:Urinary tract infection associated with indwelling urethral catheter        HPI:  Chirag Beckham is a 63 year old Black man with smoking, disabling rheumatoid arthritis, hypercalcemia since 8/28/2023, iron deficiency anemia, prediabetes, benign prostatic hyperplasia with urinary retention with chronic indwelling Fernandez catheter, gastroesophageal reflux disease, insomnia, 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 He lives in [facility] in [place], [place]. His primary care physician is Dr. Miguel Gardner. He gets his medical care at [facility].   He was hospitalized at [facility] from 7/13/2023 to 7/19/2023 for rheumatoid arthritis flare, emphysema, and latent tuberculosis infection.   He was hospitalized at [facility] from 7/19/2023 to 8/8/2023 for atypical chest pain and sepsis.   He was hospitalized at [facility] from 8/18/2023 to 9/18/2023 for rheumatoid arthritis pain and pneumonia. He completed an antibiotic course for community acquired pneumonia. Infectious Disease recommended continuing rifampin for his Mycobacterium fortuitum. It was started on 7/20/2023 and will isacc on 11/20/2023. Rheumatology recommended daily low dose prednisone with calcium and vitamin D, folic acid, weekly subcutaneous methotrexate, hydroxychloroquine daily, sulfasalazine twice daily, and outpatient infusions of abatacept 750 mg on week 0, 2, 4 and every 4 weeks thereafter at [facility]. He endorsed depressed mood due to his medical conditions, immobility/bed bound status, and  nursing home placement. He was put on mirtazapine. Indwelling Fernandez catheter was placed. He was discharged to [facility] as a new retirement nursing home resident.   He was approved for abatacept on 10/4/2023.   He presented to [facility] on 109/6/2023 with poor oral intake. He had never been at an [facility] hospital previously but medical records from [facility] were available to review. He was moaning and complaining of bladder pain. He commented that his Fernandez catheter needed to be replaced. He had suprapubic distension and tenderness. Labs showed calcium 12.9 mg/dL, increased from 11.1 on 9/18/2023, potassium 3.0 mmol/L, magnesium 1.6 mg/dL. He was given oxycodone, potassium bicarbonate, potassium chloride, 2 liters of normal saline. He was admitted to Hospital Medicine Team D.      Overview/Hospital Course:  He was put on 5% dextrose 0.9% saline infusion at 200 mL/hr. Urinalysis showed 2+ protein, >100 RBC/hpf, 57 WBC/hpf, and moderate bacteria. He was put on ceftriaxone. PTH was normal. His hypercalcemia is likely due to immobility and taking calcium carbonate as needed and calcium-vitamin D daily, both of which he is prescribed.       Interval History: Morning labs pending. On IV fluids.    Review of Systems   Constitutional:  Negative for chills and fever.   Neurological:  Negative for seizures and syncope.     Objective:     Vital Signs (Most Recent):  Temp: 98.7 °F (37.1 °C) (10/08/23 0406)  Pulse: 103 (10/08/23 0406)  Resp: 18 (10/08/23 0406)  BP: 105/77 (10/08/23 0406)  SpO2: 100 % (10/08/23 0406) Vital Signs (24h Range):  Temp:  [97.1 °F (36.2 °C)-98.7 °F (37.1 °C)] 98.7 °F (37.1 °C)  Pulse:  [103-112] 103  Resp:  [18-20] 18  SpO2:  [98 %-100 %] 100 %  BP: ()/(64-77) 105/77     Weight: 81.6 kg (180 lb)  Body mass index is 25.83 kg/m².    Intake/Output Summary (Last 24 hours) at 10/8/2023 0800  Last data filed at 10/8/2023  0633  Gross per 24 hour   Intake --   Output 1600 ml   Net -1600 ml           Physical Exam  Vitals and nursing note reviewed.   Constitutional:       General: He is not in acute distress.     Appearance: He is well-developed. He is not diaphoretic.   Pulmonary:      Effort: Pulmonary effort is normal. No respiratory distress.   Skin:     General: Skin is warm and dry.      Coloration: Skin is not jaundiced or pale.   Neurological:      Mental Status: He is alert and oriented to person, place, and time.      Motor: No tremor or seizure activity.   Psychiatric:         Attention and Perception: Attention normal.         Mood and Affect: Mood is depressed. Affect is blunt.         Behavior: Behavior is cooperative.             Significant Labs: All pertinent labs within the past 24 hours have been reviewed.    Significant Imaging: I have reviewed all pertinent imaging results/findings within the past 24 hours.      Assessment/Plan:      * Urinary tract infection associated with indwelling urethral catheter  Chronic indwelling Fernandez catheter  Giving ceftriaxone. Follow up urine culture results.    Nursing home resident  Return to [facility] after discharge.    Hypercalcemia  Chronic, PTH normal, likely due to immobility and chronic calcium intake. Stop home calcium-vitamin D. Giving IV fluids.    Hypokalemia  Hypomagnesemia  Give potassium and magnesium.     Severe protein-calorie malnutrition  Nutrition consult     Urinary retention due to benign prostatic hyperplasia  Replaced Fernandez catheter. Continue home tamsulosin.    Rheumatoid arthritis involving multiple sites with positive rheumatoid factor  Continue home hydroxychloroquine, sulfasalazine, folic acid. Should hold home methotrexate during infection. He will be getting abatacept infusions outpatient.     Primary insomnia  Continue home mirtazapine.    Physical deconditioning  Reportedly bed bound.    Latent tuberculosis  Continue home rifampin until  11/20/2023.    Iron deficiency anemia  Iron studies suggest anemia of chronic disease.    Constipation  Likely due to hypercalcemia. Giving senna-docusate and polyethylene glycol. Had a bowel movement last night.    Gastroesophageal reflux disease  Continue home Tums PRN.      VTE Risk Mitigation (From admission, onward)         Ordered     enoxaparin injection 40 mg  Daily         10/07/23 0352     IP VTE HIGH RISK PATIENT  Once         10/07/23 0352     Place sequential compression device  Until discontinued         10/07/23 0352                Discharge Planning   YUDELKA: 10/9/2023     Code Status: Full Code   Is the patient medically ready for discharge?: No    Reason for patient still in hospital (select all that apply): Laboratory test and Treatment  Discharge Plan A: Home                  Fredrick Carbajal MD  Department of Hospital Medicine   [facility] - Intensive Care ([address])

## 2023-10-08 NOTE — SUBJECTIVE & OBJECTIVE
Interval History: Morning labs pending. On IV fluids.    Review of Systems   Constitutional:  Negative for chills and fever.   Neurological:  Negative for seizures and syncope.     Objective:     Vital Signs (Most Recent):  Temp: 98.7 °F (37.1 °C) (10/08/23 0406)  Pulse: 103 (10/08/23 0406)  Resp: 18 (10/08/23 0406)  BP: 105/77 (10/08/23 0406)  SpO2: 100 % (10/08/23 0406) Vital Signs (24h Range):  Temp:  [97.1 °F (36.2 °C)-98.7 °F (37.1 °C)] 98.7 °F (37.1 °C)  Pulse:  [103-112] 103  Resp:  [18-20] 18  SpO2:  [98 %-100 %] 100 %  BP: ()/(64-77) 105/77     Weight: 81.6 kg (180 lb)  Body mass index is 25.83 kg/m².    Intake/Output Summary (Last 24 hours) at 10/8/2023 0800  Last data filed at 10/8/2023 0633  Gross per 24 hour   Intake --   Output 1600 ml   Net -1600 ml           Physical Exam  Vitals and nursing note reviewed.   Constitutional:       General: He is not in acute distress.     Appearance: He is well-developed. He is not diaphoretic.   Pulmonary:      Effort: Pulmonary effort is normal. No respiratory distress.   Skin:     General: Skin is warm and dry.      Coloration: Skin is not jaundiced or pale.   Neurological:      Mental Status: He is alert and oriented to person, place, and time.      Motor: No tremor or seizure activity.   Psychiatric:         Attention and Perception: Attention normal.         Mood and Affect: Mood is depressed. Affect is blunt.         Behavior: Behavior is cooperative.             Significant Labs: All pertinent labs within the past 24 hours have been reviewed.    Significant Imaging: I have reviewed all pertinent imaging results/findings within the past 24 hours.

## 2023-10-08 NOTE — ASSESSMENT & PLAN NOTE
Chronic, PTH normal, likely due to immobility and chronic calcium intake. Stop home calcium-vitamin D. Giving IV fluids.

## 2023-10-09 VITALS
HEIGHT: 70 IN | RESPIRATION RATE: 17 BRPM | TEMPERATURE: 98 F | OXYGEN SATURATION: 100 % | BODY MASS INDEX: 25.77 KG/M2 | SYSTOLIC BLOOD PRESSURE: 110 MMHG | HEART RATE: 115 BPM | WEIGHT: 180 LBS | DIASTOLIC BLOOD PRESSURE: 75 MMHG

## 2023-10-09 PROCEDURE — 99239 PR HOSPITAL DISCHARGE DAY,>30 MIN: ICD-10-PCS | Mod: ,,, | Performed by: HOSPITALIST

## 2023-10-09 PROCEDURE — 25000003 PHARM REV CODE 250: Performed by: HOSPITALIST

## 2023-10-09 PROCEDURE — 99239 HOSP IP/OBS DSCHRG MGMT >30: CPT | Mod: ,,, | Performed by: HOSPITALIST

## 2023-10-09 PROCEDURE — 63600175 PHARM REV CODE 636 W HCPCS: Performed by: HOSPITALIST

## 2023-10-09 RX ORDER — CEPHALEXIN 500 MG/1
500 CAPSULE ORAL 4 TIMES DAILY
Qty: 20 CAPSULE | Refills: 0 | Status: SHIPPED | OUTPATIENT
Start: 2023-10-09 | End: 2023-10-14

## 2023-10-09 RX ORDER — CEPHALEXIN 500 MG/1
500 CAPSULE ORAL EVERY 6 HOURS
Status: DISCONTINUED | OUTPATIENT
Start: 2023-10-09 | End: 2023-10-09

## 2023-10-09 RX ORDER — CEPHALEXIN 500 MG/1
500 CAPSULE ORAL EVERY 6 HOURS
Status: DISCONTINUED | OUTPATIENT
Start: 2023-10-09 | End: 2023-10-09 | Stop reason: HOSPADM

## 2023-10-09 RX ADMIN — CEPHALEXIN 500 MG: 500 CAPSULE ORAL at 05:10

## 2023-10-09 RX ADMIN — FAMOTIDINE 20 MG: 20 TABLET ORAL at 09:10

## 2023-10-09 RX ADMIN — RIFAMPIN 600 MG: 300 CAPSULE ORAL at 09:10

## 2023-10-09 RX ADMIN — FOLIC ACID 1 MG: 1 TABLET ORAL at 09:10

## 2023-10-09 RX ADMIN — CEPHALEXIN 500 MG: 500 CAPSULE ORAL at 09:10

## 2023-10-09 RX ADMIN — ENOXAPARIN SODIUM 40 MG: 40 INJECTION SUBCUTANEOUS at 05:10

## 2023-10-09 RX ADMIN — THERA TABS 1 TABLET: TAB at 09:10

## 2023-10-09 RX ADMIN — HYDROXYCHLOROQUINE SULFATE 300 MG: 200 TABLET, FILM COATED ORAL at 09:10

## 2023-10-09 RX ADMIN — CEPHALEXIN 500 MG: 500 CAPSULE ORAL at 01:10

## 2023-10-09 RX ADMIN — PREDNISONE 10 MG: 5 TABLET ORAL at 05:10

## 2023-10-09 RX ADMIN — SULFASALAZINE 1000 MG: 500 TABLET ORAL at 09:10

## 2023-10-09 NOTE — PLAN OF CARE
Problem: Adult Inpatient Plan of Care  Goal: Plan of Care Review  Outcome: Ongoing, Progressing  Goal: Patient-Specific Goal (Individualized)  Outcome: Ongoing, Progressing  Goal: Optimal Comfort and Wellbeing  Outcome: Ongoing, Progressing  Goal: Readiness for Transition of Care  Outcome: Ongoing, Progressing     Problem: Impaired Wound Healing  Goal: Optimal Wound Healing  Outcome: Ongoing, Progressing     Problem: Infection  Goal: Absence of Infection Signs and Symptoms  Outcome: Ongoing, Progressing     Problem: Skin Injury Risk Increased  Goal: Skin Health and Integrity  Outcome: Ongoing, Progressing

## 2023-10-09 NOTE — PLAN OF CARE
CM spoke with pt in room, informed d/c orders have been written.  Pt states he does not feel ready for d/c with his hand the way it is.  Requested to speak with MD about this.  CM messaged MD with this request.    2:45 PM  Pt is a resident of [facility].  EDGARDO confirmed with Hallie via phone this morning.  EDGARDO sent SNF packet and NH orders to [facility] via CP.  EDGARDO called [facility] [phone].    LORENZO CuelloN, BS, RN, CCM

## 2023-10-09 NOTE — CONSULTS
[facility] - Intensive Care ([facility]-)  Adult Nutrition  Consult Note    SUMMARY     Recommendations    1. Continue Regular Diet. Encourage High-Calorie, High-Protein food sources.   2. Recommend Boost Plus with all meals.   3. Continue appetite stimulant.   4. Recommend additional vitamin supplementation Vitamin C and B-complex.   5. RD to monitor and follow-up.    Goals: Meet % EEN/EPN by follow-up date.  Nutrition Goal Status: new  Communication of RD Recs: other (comment) (POC)    Assessment and Plan    Nutrition Problem  Inadequate oral intake    Related to (etiology):   Inability to consume sufficient energy    Signs and Symptoms (as evidenced by):   Possible AMS, decreased appetite and early satiety.     Interventions/Recommendations (treatment strategy):  Collaboration of nutrition care with other providers  Commercial beverages  Appetite stimulant  Vitamin supplementation    Nutrition Diagnosis Status:   New        Reason for Assessment    Reason For Assessment: consult  Diagnosis: other (see comments) (Urinary tract infection associated with indwelling urethral catheter)  Relevant Medical History: anemia, iron deficiency anemia, RA, tobacco use  Interdisciplinary Rounds: did not attend  General Information Comments: RD consulted for severe malnutrition. No PO or weight hx found per chart review. RD attempted to speak with patient but cognition appeared altered stating he was movign next door to the apartment with nephew and his shoes had come off but patient was laying in bed under the blankets. NFPE declined by patient, states he's good and don't need that. RD attempted to reach out to nurse via phone but no answer. RD suspects malnutrition.  Nutrition Discharge Planning: Pending Clinical Course    Nutrition Risk Screen    Nutrition Risk Screen: no indicators present    Nutrition/Diet History    Patient Reported Diet/Restrictions/Preferences: general  Food Allergies: NKFA  Factors Affecting  "Nutritional Intake: impaired cognitive status/motor control    Anthropometrics    Temp: 97.9 °F (36.6 °C)  Height Method: Stated  Height: 5' 10" (177.8 cm)  Height (inches): 70 in  Weight Method: Stated  Weight: 81.6 kg (180 lb)  Weight (lb): 180 lb  Ideal Body Weight (IBW), Male: 166 lb  % Ideal Body Weight, Male (lb): 108.43 %  BMI (Calculated): 25.8       Lab/Procedures/Meds    Pertinent Labs Reviewed: reviewed  Pertinent Labs Comments: K 3.0, BUN 5, Glu 126, Ca 10.7, P 2.5, Mg 1.5, AST 9, ALT 8  Pertinent Medications Reviewed: reviewed  Pertinent Medications Comments: enoxaparin, famotidine, folic acid, hydroxychloroquine, mirtazapine, polyethylene glycol, prednisone, senna-docusate, tamuslosin, thiamine      Estimated/Assessed Needs    Weight Used For Calorie Calculations: 81.6 kg (180 lb)  Energy Calorie Requirements (kcal): 2022 kcal/d (MSJ x 1.25)  Energy Need Method: [place]  Protein Requirements: 82 g/d (1.0 g/kg)  Weight Used For Protein Calculations: 81.6 kg (180 lb)        RDA Method (mL): 2022         Nutrition Prescription Ordered    Current Diet Order: Regular    Evaluation of Received Nutrient/Fluid Intake    I/O: -3.00 L  Comments: LBM: 10/8  % Intake of Estimated Energy Needs: 75 - 100 %  % Meal Intake: 50 - 75 %    Nutrition Risk    Level of Risk/Frequency of Follow-up: high (2x/ week)       Monitor and Evaluation    Food and Nutrient Intake: energy intake, food and beverage intake  Food and Nutrient Adminstration: diet order  Knowledge/Beliefs/Attitudes: beliefs and attitudes, food and nutrition knowledge/skill  Physical Activity and Function: factors affecting access to physical activity, nutrition-related ADLs and IADLs  Anthropometric Measurements: body mass index, weight change, weight  Biochemical Data, Medical Tests and Procedures: inflammatory profile, glucose/endocrine profile, gastrointestinal profile, electrolyte and renal panel, lipid profile  Nutrition-Focused Physical " Findings: skin, head and eyes, extremities, muscles and bones, overall appearance       Nutrition Follow-Up    RD Follow-up?: Yes    Priscilla Fletcher Registration Eligible, Provisional LDN

## 2023-10-09 NOTE — PLAN OF CARE
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  NOTE: AFB from 7/17/2023 grew M. fortuitum. AFB smear was negative and all other smears and cultures have been negative to date. This organism is common in water and soil and represents either a transient infection or contamination (most likely the latter given no other evidence of it in sputum) and is not clinically significant.      Constipation [K59.00]  Yes    Primary insomnia [F51.01]  Yes     Chronic    Gastroesophageal reflux disease [K21.9]  Yes     Chronic    Urinary retention due to benign prostatic hyperplasia [N40.1, R33.8]  Yes     Chronic    Nursing home resident [Z59.3]  Not Applicable     Chronic     [facility]      Physical deconditioning [R53.81]  Yes     Chronic    Hypercalcemia [E83.52]  Yes     Chronic    Severe protein-calorie malnutrition [E43]  Yes    Rheumatoid arthritis involving multiple sites with positive rheumatoid factor [M05.79]  Yes     Chronic     Formatting of this note might be different from the original.  diagnosed July 2023  Mostly in hands, wrists; and (to a lesser degree) in feet and ankles      Iron deficiency anemia [D50.9]  Yes     Chronic      Resolved Hospital Problems   No resolved problems to display.       Allergies:Review of patient's allergies indicates:  Not on File    Vitals:        Routine    Diet: regular diet     Activities:  as tolerated    LABS:  Per facility protocol    Nursing Precautions:      - Fall precautions per nursing home protocol   - Decubitus precautions:        -  for positioning   - Pressure reducing foam mattress   - Turn patient every two hours. Use wedge pillows to anchor patient    MISCELLANEOUS CARE:       Fernandez Care: Empty Fernandez bag every shift.  Change Fernandez every month     Routine Skin for Bedridden Patients:  Apply moisture barrier cream to all    skin folds and wet areas in perineal area daily and after baths and                           all bowel movements.    Wound Care:   Right medial buttock ulcer - consult wound care nurse, offload pressure      Medications:   Reconciled Home Medications:      Medication List        START taking these medications      cephALEXin 500 MG capsule  Commonly known as: KEFLEX  Take 1 capsule (500 mg total) by mouth 4 (four) times daily. End date 10/14/2023. for 5 days            CHANGE how you take these medications      methotrexate (PF) 12.5 mg/0.5 mL Syrg  Inject 25 mg into the skin every Sunday. Hold until cephalexin course finished.  Start taking on: October 15, 2023  What changed: additional instructions            CONTINUE taking these medications      acetaminophen 325 MG tablet  Commonly known as: TYLENOL  Take 975 mg by mouth every 6 (six) hours as needed for Pain.     diclofenac sodium 1 % Gel  Commonly known as: VOLTAREN  Apply 2 g topically 4 (four) times daily as needed (hand pain).     famotidine 20 MG tablet  Commonly known as: PEPCID  Take 20 mg by mouth 2 (two) times daily.     folic acid 1 MG tablet  Commonly known as: FOLVITE  Take 1 mg by mouth once daily.     hydroxychloroquine 300 mg Tab  Take 300 mg by mouth once daily.     melatonin 5 mg  Commonly known as: MELATIN  Take 5 mg by mouth nightly.     mirtazapine 15 MG tablet  Commonly known as: REMERON  Take 15 mg by mouth every evening.     multivitamin  with minerals tablet  Take 1 tablet by mouth once daily.     oxyCODONE 5 MG immediate release tablet  Commonly known as: ROXICODONE  Take 5 mg by mouth every 6 (six) hours as needed for Pain.     polyethylene glycol 17 gram Pwpk  Commonly known as: GLYCOLAX  Take 1 packet by mouth once daily.     predniSONE 10 MG tablet  Commonly known as: DELTASONE  Take 10 mg by mouth every evening.     rifAMpin 300 MG capsule  Commonly known as: RIFADIN  Take 600 mg by mouth once daily.     sulfaSALAzine 500 mg Tab  Commonly known as: AZULFIDINE  Take 1,000 mg by mouth 2 (two) times daily.     tamsulosin 0.4 mg Cap  Commonly known as: FLOMAX  Take 1 capsule by mouth nightly.     thiamine 100 MG tablet  Take 1 tablet by mouth every evening.     tiZANidine 2 MG tablet  Commonly known as: ZANAFLEX  Take 2 mg by mouth every 6 (six) hours as needed (spasms).            STOP taking these medications      calcium carbonate 200 mg calcium (500 mg) chewable tablet  Commonly known as: TUMS     calcium-vitamin D3 500 mg-5 mcg (200 unit) per tablet  Commonly known as: OS-DAMIEN 500 + D3                      _________________________________  Fredrick Carbajal MD  10/09/2023

## 2023-10-09 NOTE — PLAN OF CARE
Problem: Adult Inpatient Plan of Care  Goal: Plan of Care Review  Outcome: Ongoing, Progressing  Goal: Absence of Hospital-Acquired Illness or Injury  Outcome: Ongoing, Progressing  Goal: Readiness for Transition of Care  Outcome: Ongoing, Progressing     Problem: Impaired Wound Healing  Goal: Optimal Wound Healing  Outcome: Ongoing, Progressing     Problem: Infection  Goal: Absence of Infection Signs and Symptoms  Outcome: Ongoing, Progressing     Problem: Skin Injury Risk Increased  Goal: Skin Health and Integrity  Outcome: Ongoing, Progressing

## 2023-10-09 NOTE — DISCHARGE SUMMARY
[facility] Intensive Care ([address] Medicine  Discharge Summary      Patient Name: Chirag Beckham  MRN: [id number]  [facility]: [id number]  Patient Class: IP- Inpatient  Admission Date: 10/6/2023  Hospital Length of Stay: 3 days  Discharge Date and Time: 10/9/2023  7:37 PM  Attending Physician: Fredrick Carbajal MD   Discharging Provider: Fredrick Carbajal MD  Primary Care Provider: Miguel Gardner MD  [facility] Medicine Team: [facility]OS[facility] MED D Fredrick Carbajal MD  Primary Care Team: 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 He lives in [facility] in [place], [place]. His primary care physician is Dr. Miguel Gardner. He gets his medical care at [facility].   He was hospitalized at [facility] from 7/13/2023 to 7/19/2023 for rheumatoid arthritis flare, emphysema, and latent tuberculosis infection.   He was hospitalized at [facility] from 7/19/2023 to 8/8/2023 for atypical chest pain and sepsis.   He was hospitalized at [facility] from 8/18/2023 to 9/18/2023 for rheumatoid arthritis pain and pneumonia. He completed an antibiotic course for community acquired pneumonia. Infectious Disease recommended continuing rifampin for his Mycobacterium fortuitum. It was started on 7/20/2023 and will isacc on 11/20/2023. Rheumatology recommended daily low dose prednisone with calcium and vitamin D, folic acid, weekly subcutaneous methotrexate, hydroxychloroquine daily, sulfasalazine twice daily, and outpatient infusions of abatacept 750 mg on week 0, 2, 4 and every 4 weeks thereafter at [facility] He endorsed depressed mood due to his medical conditions, immobility/bed bound status, and nursing home placement. He was put on mirtazapine. Indwelling Fernandez catheter was placed. He was discharged to [facility] as a new skilled nursing nursing home resident.   He was approved for abatacept on 10/4/2023.   He presented to [facility] Emergency Department on 109/6/2023 with poor oral intake. He had never been at an [facility] previously but medical records from [facility] were available to review. He was moaning and complaining of bladder pain. He commented that his Fernandez catheter needed to be replaced. He had suprapubic distension and tenderness. Labs showed calcium 12.9 mg/dL, increased from 11.1 on 9/18/2023, potassium 3.0 mmol/L, magnesium 1.6 mg/dL. He was given oxycodone, potassium bicarbonate, potassium chloride, 2 liters of normal saline. He was admitted to Hospital Medicine Team D.        Hospital Course:   He was put on 5% dextrose 0.9% saline infusion at 200 mL/hr. Urinalysis showed 2+ protein, >100 RBC/hpf, 57 WBC/hpf, and moderate bacteria. He was put on ceftriaxone. PTH was normal. His hypercalcemia is likely due to immobility and taking calcium carbonate as needed and calcium-vitamin D daily, both of which he is prescribed. Calcium improved to 10.1. Urine culture had no growth. He was prescribed 5 days of cephalexin. Calcium carbonate and calcium-vitamin D were discontinued.        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] 10/06/2023 Yes    Constipation [K59.00] 09/22/2023 Yes    Primary insomnia [F51.01] 09/22/2023 Yes     Chronic    Gastroesophageal reflux disease [K21.9] 09/22/2023 Yes     Chronic    Urinary retention due to benign prostatic hyperplasia [N40.1, R33.8] 09/22/2023 Yes     Chronic    Nursing home resident [Z59.3] 09/22/2023 Not Applicable     Chronic    Physical deconditioning [R53.81] 09/18/2023 Yes     Chronic    Hypercalcemia [E83.52] 08/28/2023 Yes     Chronic    Severe protein-calorie malnutrition [E43] 08/21/2023 Yes    Rheumatoid arthritis involving multiple sites with positive rheumatoid factor [M05.79] 07/12/2023 Yes     Chronic    Iron deficiency anemia [D50.9] 06/13/2023 Yes     Chronic      Problems Resolved During this Admission:       Discharged Condition: good    Disposition: halfway Nursing Home    Follow Up:   Follow-up Information       Miguel Gardner MD Follow up.    Specialties: Internal Medicine, Family Medicine  Contact information:  [address] [id number] [phone]                           Patient Instructions:      Diet Adult Regular     Notify your health care provider if you experience any of the following:  persistent nausea and vomiting or diarrhea     Activity as tolerated       Significant Diagnostic Studies:   X-Ray Chest AP Portable 10/06/23: FINDINGS:   The lungs are hyperexpanded.  There are bullous emphysematous changes of the lung apices.  There is mild coarse chronic interstitial prominence.  There is no large focal consolidation.  The pleural spaces are clear.  The cardiac silhouette is unremarkable.  Osseous structures demonstrate degenerative changes.   Impression:  No acute abnormality.   Hyperexpanded lungs and emphysematous changes as above.     Pending Diagnostic Studies:        Procedure Component Value Units Date/Time    Vitamin B1 [[id number]] Collected: 10/07/23 0835    Order Status: Sent Lab Status: In process Updated: 10/07/23 0901    Specimen: Blood     Narrative:      Collection has been rescheduled by [facility] at 10/07/2023 05:33 Reason:   Patient Refused  tried to notifity nurse Jermaine  Collection has been rescheduled by T at 10/07/2023 08:12 Reason:   Patient Refused, nurse Nicole Chase notified. Also charge nurse   vani [id number] notified.    Vitamin B6 [[id number]] Collected: 10/07/23 0835    Order Status: Sent Lab Status: In process Updated: 10/07/23 0901    Specimen: Blood     Narrative:      Collection has been rescheduled by [facility] at 10/07/2023 05:33 Reason:   Patient Refused  tried to notifity nurse Jermaine  Collection has been rescheduled by T at 10/07/2023 08:12 Reason:   Patient Refused, nurse Nicole Chase notified. Also charge nurse   vani [id number] notified.    Zinc [[id number]] Collected: 10/07/23 0835    Order Status: Sent Lab Status: In process Updated: 10/07/23 0901    Specimen: Blood     Narrative:      Collection has been rescheduled by [facility] at 10/07/2023 05:33 Reason:   Patient Refused  tried to notifity nurse Jermaine  Collection has been rescheduled by T at 10/07/2023 08:12 Reason:   Patient Refused, nurse Nicole Chase notified. Also charge nurse   vani [id number] notified.           Medications:  Reconciled Home Medications:      Medication List        START taking these medications      cephALEXin 500 MG capsule  Commonly known as: KEFLEX  Take 1 capsule (500 mg total) by mouth 4 (four) times daily. End date 10/14/2023. for 5 days            CHANGE how you take these medications      methotrexate (PF) 12.5 mg/0.5 mL Syrg  Inject 25 mg into the skin every Sunday. Hold until cephalexin course finished.  Start taking on: October 15, 2023  What changed: additional instructions            CONTINUE taking these medications      acetaminophen 325 MG tablet  Commonly known as:  TYLENOL  Take 975 mg by mouth every 6 (six) hours as needed for Pain.     diclofenac sodium 1 % Gel  Commonly known as: VOLTAREN  Apply 2 g topically 4 (four) times daily as needed (hand pain).     famotidine 20 MG tablet  Commonly known as: PEPCID  Take 20 mg by mouth 2 (two) times daily.     folic acid 1 MG tablet  Commonly known as: FOLVITE  Take 1 mg by mouth once daily.     hydroxychloroquine 300 mg Tab  Take 300 mg by mouth once daily.     melatonin 5 mg  Commonly known as: MELATIN  Take 5 mg by mouth nightly.     mirtazapine 15 MG tablet  Commonly known as: REMERON  Take 15 mg by mouth every evening.     multivitamin with minerals tablet  Take 1 tablet by mouth once daily.     oxyCODONE 5 MG immediate release tablet  Commonly known as: ROXICODONE  Take 5 mg by mouth every 6 (six) hours as needed for Pain.     polyethylene glycol 17 gram Pwpk  Commonly known as: GLYCOLAX  Take 1 packet by mouth once daily.     predniSONE 10 MG tablet  Commonly known as: DELTASONE  Take 10 mg by mouth every evening.     rifAMpin 300 MG capsule  Commonly known as: RIFADIN  Take 600 mg by mouth once daily.     sulfaSALAzine 500 mg Tab  Commonly known as: AZULFIDINE  Take 1,000 mg by mouth 2 (two) times daily.     tamsulosin 0.4 mg Cap  Commonly known as: FLOMAX  Take 1 capsule by mouth nightly.     thiamine 100 MG tablet  Take 1 tablet by mouth every evening.     tiZANidine 2 MG tablet  Commonly known as: ZANAFLEX  Take 2 mg by mouth every 6 (six) hours as needed (spasms).            STOP taking these medications      calcium carbonate 200 mg calcium (500 mg) chewable tablet  Commonly known as: TUMS     calcium-vitamin D3 500 mg-5 mcg (200 unit) per tablet  Commonly known as: OS-DAMIEN 500 + D3              Indwelling Lines/Drains at time of discharge:   Lines/Drains/Airways       Drain  Duration                  Urethral Catheter 10/07/23 1802 Double-lumen 1 day                    Time spent on the discharge of patient: 35  minutes         Fredrick Carbajal MD  Department of Hospital Medicine  [facility] - Intensive Care ([place]-)

## 2023-10-09 NOTE — PLAN OF CARE
Recommendations    1. Continue Regular Diet. Encourage High-Calorie, High-Protein food sources.   2. Recommend Boost Plus with all meals.   3. Continue appetite stimulant.   4. Recommend additional vitamin supplementation Vitamin C and B-complex.   5. RD to monitor and follow-up.    Goals: Meet % EEN/EPN by follow-up date.  Nutrition Goal Status: new  Communication of RD Recs: other (comment) (POC)

## 2023-10-09 NOTE — PLAN OF CARE
[facility] - Intensive Care ([facility]-16)  Discharge Reassessment    Primary Care Provider: Miguel Gardner MD    Expected Discharge Date: 10/9/2023    Reassessment (most recent)       Discharge Reassessment - 10/09/23 1102          Discharge Reassessment    Assessment Type Discharge Planning Reassessment (P)      Did the patient's condition or plan change since previous assessment? No (P)      Discharge Plan discussed with: Patient (P)      Communicated YUDELKA with patient/caregiver Date not available/Unable to determine (P)      Discharge Plan A Return to nursing home (P)      Discharge Plan B Return to Nursing Home (P)      DME Needed Upon Discharge  none (P)      Transition of Care Barriers Transportation (P)      Why the patient remains in the hospital Requires continued medical care (P)         Post-Acute Status    Post-Acute Authorization Placement (P)      Discharge Delays Waiting for Provider to Speak to Patient (P)                    CM spoke with pt in room, informed d/c orders had been written.  Pt states he's not ready for d/c with his hand the way it is, he wants to speak with MD.  Dr. Carbajal informed.  MD states he will delay d/c and speak with the pt.      Return to NH referral sent to [facility].    EDGARDO spoke with Hallie at [facility] [phone].  Spoke with Hallie, who confirms that pt a resident of [facility].    3:13 PM  DC orders put in.  CM sent NH orders to [facility] via CP.  CM called [facility] to discuss, was not able to get through to admissions.  No response in CP yet.    3:27 PM  EDGARDO spoke with Zahraa in admissions at [facility], she agrees to check d/c orders and call CM back with number for report.    3:47 PM  Per Zahraa at [facility], nurse to call [phone], ask for [address] nurse, room [address]A.  Nurse Abi notified.  EDGARDO set up ambulance transport for 4:15.    LORENZO CuelloN, BS, RN, CCM

## 2023-10-10 NOTE — PLAN OF CARE
[facility] - Intensive Care ([facility])  Discharge Final Note    Primary Care Provider: Miguel Gardner MD    Expected Discharge Date: 10/9/2023    Final Discharge Note (most recent)       Final Note - 10/10/23 0811          Final Note    Assessment Type Final Discharge Note (P)      Anticipated Discharge Disposition retirement Nursing Home (P)         Post-Acute Status    Discharge Delays None known at this time (P)                      Important Message from Medicare             Contact Info       Miguel Gardner MD   Specialty: Internal Medicine, Family Medicine   Relationship: PCP - General    [address] [place] [address]  [address] [place] [address]   Phone: [phone]       Next Steps: Follow up        Pt dc'd to [facility].    Karla Knet, LORENZON, BS, RN, CCM

## 2023-10-11 LAB
VIT B1 BLD-MCNC: 94 UG/L (ref 38–122)
ZINC SERPL-MCNC: 80 UG/DL (ref 60–130)

## 2023-10-16 ENCOUNTER — HOSPITAL ENCOUNTER (INPATIENT)
Facility: HOSPITAL | Age: 63
LOS: 4 days | Discharge: NURSING, REHAB OR LONG-TERM CARE | DRG: 699 | End: 2023-10-20
Attending: STUDENT IN AN ORGANIZED HEALTH CARE EDUCATION/TRAINING PROGRAM | Admitting: INTERNAL MEDICINE
Payer: MEDICAID

## 2023-10-16 DIAGNOSIS — R07.9 CHEST PAIN: ICD-10-CM

## 2023-10-16 DIAGNOSIS — N39.0 URINARY TRACT INFECTION ASSOCIATED WITH INDWELLING URETHRAL CATHETER, SUBSEQUENT ENCOUNTER: Primary | ICD-10-CM

## 2023-10-16 DIAGNOSIS — R00.0 TACHYCARDIA: ICD-10-CM

## 2023-10-16 DIAGNOSIS — T83.511D URINARY TRACT INFECTION ASSOCIATED WITH INDWELLING URETHRAL CATHETER, SUBSEQUENT ENCOUNTER: Primary | ICD-10-CM

## 2023-10-16 DIAGNOSIS — A41.9 SEPSIS, DUE TO UNSPECIFIED ORGANISM, UNSPECIFIED WHETHER ACUTE ORGAN DYSFUNCTION PRESENT: ICD-10-CM

## 2023-10-16 DIAGNOSIS — D64.9 ANEMIA, UNSPECIFIED TYPE: ICD-10-CM

## 2023-10-16 DIAGNOSIS — E83.52 HYPERCALCEMIA: Chronic | ICD-10-CM

## 2023-10-16 PROCEDURE — 93010 ELECTROCARDIOGRAM REPORT: CPT | Mod: ,,, | Performed by: INTERNAL MEDICINE

## 2023-10-16 PROCEDURE — 96367 TX/PROPH/DG ADDL SEQ IV INF: CPT

## 2023-10-16 PROCEDURE — 96365 THER/PROPH/DIAG IV INF INIT: CPT

## 2023-10-16 PROCEDURE — 94761 N-INVAS EAR/PLS OXIMETRY MLT: CPT

## 2023-10-16 PROCEDURE — 93010 EKG 12-LEAD: ICD-10-PCS | Mod: ,,, | Performed by: INTERNAL MEDICINE

## 2023-10-16 PROCEDURE — 87502 INFLUENZA DNA AMP PROBE: CPT

## 2023-10-16 PROCEDURE — 93005 ELECTROCARDIOGRAM TRACING: CPT

## 2023-10-16 PROCEDURE — U0002 COVID-19 LAB TEST NON-CDC: HCPCS

## 2023-10-16 PROCEDURE — 96361 HYDRATE IV INFUSION ADD-ON: CPT

## 2023-10-16 PROCEDURE — 12000002 HC ACUTE/MED SURGE SEMI-PRIVATE ROOM

## 2023-10-16 PROCEDURE — 99285 EMERGENCY DEPT VISIT HI MDM: CPT | Mod: 25

## 2023-10-16 PROCEDURE — 96366 THER/PROPH/DIAG IV INF ADDON: CPT

## 2023-10-16 NOTE — Clinical Note
Diagnosis: Urinary tract infection associated with indwelling urethral catheter, subsequent encounter [[id number]]   Admitting Provider:: PRAVIN ALLISON [[id number]]   Future Attending Provider: PRAVIN ALLISON [[id number]]   Reason for IP Medical Treatment  (Clinical interventions that can only be accomplished in the IP setting? ) :: iv abx   I certify that Inpatient services for greater than or equal to 2 midnights are medically necessary:: Yes   Plans for Post-Acute care--if anticipated (pick the single best option):: A. No post acute care anticipated at this time

## 2023-10-17 PROBLEM — T83.091A OBSTRUCTION OF FOLEY CATHETER: Status: ACTIVE | Noted: 2023-10-17

## 2023-10-17 PROBLEM — N39.0 URINARY TRACT INFECTION ASSOCIATED WITH INDWELLING URETHRAL CATHETER: Status: RESOLVED | Noted: 2023-10-07 | Resolved: 2023-10-17

## 2023-10-17 PROBLEM — T83.511A URINARY TRACT INFECTION ASSOCIATED WITH INDWELLING URETHRAL CATHETER: Status: RESOLVED | Noted: 2023-10-07 | Resolved: 2023-10-17

## 2023-10-17 PROBLEM — Z51.5 PALLIATIVE CARE ENCOUNTER: Status: ACTIVE | Noted: 2023-10-17

## 2023-10-17 PROBLEM — R82.71 BACTERIURIA: Status: ACTIVE | Noted: 2023-10-17

## 2023-10-17 LAB
ABO + RH BLD: NORMAL
ALBUMIN SERPL BCP-MCNC: 1.6 G/DL (ref 3.5–5.2)
ALBUMIN SERPL BCP-MCNC: 1.8 G/DL (ref 3.5–5.2)
ALLENS TEST: NORMAL
ALP SERPL-CCNC: 81 U/L (ref 55–135)
ALP SERPL-CCNC: 91 U/L (ref 55–135)
ALT SERPL W/O P-5'-P-CCNC: 6 U/L (ref 10–44)
ALT SERPL W/O P-5'-P-CCNC: 8 U/L (ref 10–44)
ANION GAP SERPL CALC-SCNC: 10 MMOL/L (ref 8–16)
ANION GAP SERPL CALC-SCNC: 8 MMOL/L (ref 8–16)
AST SERPL-CCNC: 12 U/L (ref 10–40)
AST SERPL-CCNC: 9 U/L (ref 10–40)
BACTERIA #/AREA URNS AUTO: ABNORMAL /HPF
BASOPHILS # BLD AUTO: 0.01 K/UL (ref 0–0.2)
BASOPHILS # BLD AUTO: 0.01 K/UL (ref 0–0.2)
BASOPHILS NFR BLD: 0.1 % (ref 0–1.9)
BASOPHILS NFR BLD: 0.1 % (ref 0–1.9)
BILIRUB SERPL-MCNC: 0.2 MG/DL (ref 0.1–1)
BILIRUB SERPL-MCNC: 0.4 MG/DL (ref 0.1–1)
BILIRUB UR QL STRIP: NEGATIVE
BLD GP AB SCN CELLS X3 SERPL QL: NORMAL
BLD PROD TYP BPU: NORMAL
BLOOD UNIT EXPIRATION DATE: NORMAL
BLOOD UNIT TYPE CODE: 600
BLOOD UNIT TYPE: NORMAL
BUN SERPL-MCNC: 8 MG/DL (ref 8–23)
BUN SERPL-MCNC: 8 MG/DL (ref 8–23)
CALCIUM SERPL-MCNC: 11.3 MG/DL (ref 8.7–10.5)
CALCIUM SERPL-MCNC: 11.9 MG/DL (ref 8.7–10.5)
CHLORIDE SERPL-SCNC: 102 MMOL/L (ref 95–110)
CHLORIDE SERPL-SCNC: 103 MMOL/L (ref 95–110)
CLARITY UR REFRACT.AUTO: ABNORMAL
CO2 SERPL-SCNC: 26 MMOL/L (ref 23–29)
CO2 SERPL-SCNC: 27 MMOL/L (ref 23–29)
CODING SYSTEM: NORMAL
COLOR UR AUTO: ABNORMAL
CREAT SERPL-MCNC: 0.7 MG/DL (ref 0.5–1.4)
CREAT SERPL-MCNC: 0.7 MG/DL (ref 0.5–1.4)
CROSSMATCH INTERPRETATION: NORMAL
DIFFERENTIAL METHOD: ABNORMAL
DIFFERENTIAL METHOD: ABNORMAL
DISPENSE STATUS: NORMAL
EOSINOPHIL # BLD AUTO: 0 K/UL (ref 0–0.5)
EOSINOPHIL # BLD AUTO: 0 K/UL (ref 0–0.5)
EOSINOPHIL NFR BLD: 0.3 % (ref 0–8)
EOSINOPHIL NFR BLD: 0.5 % (ref 0–8)
ERYTHROCYTE [DISTWIDTH] IN BLOOD BY AUTOMATED COUNT: 16.4 % (ref 11.5–14.5)
ERYTHROCYTE [DISTWIDTH] IN BLOOD BY AUTOMATED COUNT: 16.6 % (ref 11.5–14.5)
EST. GFR  (NO RACE VARIABLE): >60 ML/MIN/1.73 M^2
EST. GFR  (NO RACE VARIABLE): >60 ML/MIN/1.73 M^2
GLUCOSE SERPL-MCNC: 120 MG/DL (ref 70–110)
GLUCOSE SERPL-MCNC: 199 MG/DL (ref 70–110)
GLUCOSE UR QL STRIP: NEGATIVE
HCT VFR BLD AUTO: 20.6 % (ref 40–54)
HCT VFR BLD AUTO: 23.2 % (ref 40–54)
HGB BLD-MCNC: 6 G/DL (ref 14–18)
HGB BLD-MCNC: 6.8 G/DL (ref 14–18)
HGB UR QL STRIP: NEGATIVE
HYALINE CASTS UR QL AUTO: 0 /LPF
IMM GRANULOCYTES # BLD AUTO: 0.02 K/UL (ref 0–0.04)
IMM GRANULOCYTES # BLD AUTO: 0.02 K/UL (ref 0–0.04)
IMM GRANULOCYTES NFR BLD AUTO: 0.2 % (ref 0–0.5)
IMM GRANULOCYTES NFR BLD AUTO: 0.2 % (ref 0–0.5)
INFLUENZA A, MOLECULAR: NEGATIVE
INFLUENZA B, MOLECULAR: NEGATIVE
KETONES UR QL STRIP: NEGATIVE
LDH SERPL L TO P-CCNC: 0.62 MMOL/L (ref 0.5–2.2)
LEUKOCYTE ESTERASE UR QL STRIP: ABNORMAL
LYMPHOCYTES # BLD AUTO: 1.9 K/UL (ref 1–4.8)
LYMPHOCYTES # BLD AUTO: 1.9 K/UL (ref 1–4.8)
LYMPHOCYTES NFR BLD: 20.6 % (ref 18–48)
LYMPHOCYTES NFR BLD: 22.8 % (ref 18–48)
MAGNESIUM SERPL-MCNC: 1.6 MG/DL (ref 1.6–2.6)
MCH RBC QN AUTO: 26.7 PG (ref 27–31)
MCH RBC QN AUTO: 26.7 PG (ref 27–31)
MCHC RBC AUTO-ENTMCNC: 29.1 G/DL (ref 32–36)
MCHC RBC AUTO-ENTMCNC: 29.3 G/DL (ref 32–36)
MCV RBC AUTO: 91 FL (ref 82–98)
MCV RBC AUTO: 92 FL (ref 82–98)
MICROSCOPIC COMMENT: ABNORMAL
MONOCYTES # BLD AUTO: 0.8 K/UL (ref 0.3–1)
MONOCYTES # BLD AUTO: 0.8 K/UL (ref 0.3–1)
MONOCYTES NFR BLD: 10.1 % (ref 4–15)
MONOCYTES NFR BLD: 8.6 % (ref 4–15)
NEUTROPHILS # BLD AUTO: 5.5 K/UL (ref 1.8–7.7)
NEUTROPHILS # BLD AUTO: 6.4 K/UL (ref 1.8–7.7)
NEUTROPHILS NFR BLD: 66.3 % (ref 38–73)
NEUTROPHILS NFR BLD: 70.2 % (ref 38–73)
NITRITE UR QL STRIP: NEGATIVE
NRBC BLD-RTO: 0 /100 WBC
NRBC BLD-RTO: 0 /100 WBC
PH UR STRIP: 7 [PH] (ref 5–8)
PHOSPHATE SERPL-MCNC: 2.7 MG/DL (ref 2.7–4.5)
PLATELET # BLD AUTO: 302 K/UL (ref 150–450)
PLATELET # BLD AUTO: 316 K/UL (ref 150–450)
PMV BLD AUTO: 10.1 FL (ref 9.2–12.9)
PMV BLD AUTO: 8.3 FL (ref 9.2–12.9)
POTASSIUM SERPL-SCNC: 3.2 MMOL/L (ref 3.5–5.1)
POTASSIUM SERPL-SCNC: 3.5 MMOL/L (ref 3.5–5.1)
PROT SERPL-MCNC: 7.1 G/DL (ref 6–8.4)
PROT SERPL-MCNC: 8.2 G/DL (ref 6–8.4)
PROT UR QL STRIP: ABNORMAL
RBC # BLD AUTO: 2.25 M/UL (ref 4.6–6.2)
RBC # BLD AUTO: 2.55 M/UL (ref 4.6–6.2)
RBC #/AREA URNS AUTO: >100 /HPF (ref 0–4)
SAMPLE: NORMAL
SARS-COV-2 RDRP RESP QL NAA+PROBE: NEGATIVE
SITE: NORMAL
SODIUM SERPL-SCNC: 137 MMOL/L (ref 136–145)
SODIUM SERPL-SCNC: 139 MMOL/L (ref 136–145)
SP GR UR STRIP: 1.01 (ref 1–1.03)
SPECIMEN OUTDATE: NORMAL
SPECIMEN SOURCE: NORMAL
TRANS ERYTHROCYTES VOL PATIENT: NORMAL ML
URN SPEC COLLECT METH UR: ABNORMAL
WBC # BLD AUTO: 8.24 K/UL (ref 3.9–12.7)
WBC # BLD AUTO: 9.17 K/UL (ref 3.9–12.7)
WBC #/AREA URNS AUTO: >100 /HPF (ref 0–5)

## 2023-10-17 PROCEDURE — 99222 1ST HOSP IP/OBS MODERATE 55: CPT | Mod: ,,, | Performed by: STUDENT IN AN ORGANIZED HEALTH CARE EDUCATION/TRAINING PROGRAM

## 2023-10-17 PROCEDURE — P9021 RED BLOOD CELLS UNIT: HCPCS

## 2023-10-17 PROCEDURE — 80053 COMPREHEN METABOLIC PANEL: CPT | Mod: 91

## 2023-10-17 PROCEDURE — 81001 URINALYSIS AUTO W/SCOPE: CPT

## 2023-10-17 PROCEDURE — 63600175 PHARM REV CODE 636 W HCPCS

## 2023-10-17 PROCEDURE — 85025 COMPLETE CBC W/AUTO DIFF WBC: CPT

## 2023-10-17 PROCEDURE — 83735 ASSAY OF MAGNESIUM: CPT

## 2023-10-17 PROCEDURE — 99222 PR INITIAL HOSPITAL CARE,LEVL II: ICD-10-PCS | Mod: ,,, | Performed by: STUDENT IN AN ORGANIZED HEALTH CARE EDUCATION/TRAINING PROGRAM

## 2023-10-17 PROCEDURE — 80053 COMPREHEN METABOLIC PANEL: CPT

## 2023-10-17 PROCEDURE — 84100 ASSAY OF PHOSPHORUS: CPT

## 2023-10-17 PROCEDURE — G0378 HOSPITAL OBSERVATION PER HR: HCPCS

## 2023-10-17 PROCEDURE — 21400001 HC TELEMETRY ROOM

## 2023-10-17 PROCEDURE — 86920 COMPATIBILITY TEST SPIN: CPT

## 2023-10-17 PROCEDURE — 85025 COMPLETE CBC W/AUTO DIFF WBC: CPT | Mod: 91

## 2023-10-17 PROCEDURE — 25000003 PHARM REV CODE 250

## 2023-10-17 PROCEDURE — 99497 ADVNCD CARE PLAN 30 MIN: CPT | Mod: 25,,, | Performed by: STUDENT IN AN ORGANIZED HEALTH CARE EDUCATION/TRAINING PROGRAM

## 2023-10-17 PROCEDURE — 99497 PR ADVNCD CARE PLAN 30 MIN: ICD-10-PCS | Mod: 25,,, | Performed by: STUDENT IN AN ORGANIZED HEALTH CARE EDUCATION/TRAINING PROGRAM

## 2023-10-17 PROCEDURE — 99900035 HC TECH TIME PER 15 MIN (STAT)

## 2023-10-17 PROCEDURE — 82397 CHEMILUMINESCENT ASSAY: CPT

## 2023-10-17 PROCEDURE — 83605 ASSAY OF LACTIC ACID: CPT

## 2023-10-17 PROCEDURE — 87086 URINE CULTURE/COLONY COUNT: CPT

## 2023-10-17 PROCEDURE — 86900 BLOOD TYPING SEROLOGIC ABO: CPT

## 2023-10-17 PROCEDURE — 87040 BLOOD CULTURE FOR BACTERIA: CPT

## 2023-10-17 RX ORDER — DICLOFENAC SODIUM 10 MG/G
2 GEL TOPICAL 4 TIMES DAILY PRN
Status: DISCONTINUED | OUTPATIENT
Start: 2023-10-17 | End: 2023-10-20 | Stop reason: HOSPADM

## 2023-10-17 RX ORDER — MAGNESIUM SULFATE HEPTAHYDRATE 40 MG/ML
2 INJECTION, SOLUTION INTRAVENOUS ONCE
Status: COMPLETED | OUTPATIENT
Start: 2023-10-17 | End: 2023-10-17

## 2023-10-17 RX ORDER — HYDROCODONE BITARTRATE AND ACETAMINOPHEN 500; 5 MG/1; MG/1
TABLET ORAL
Status: DISCONTINUED | OUTPATIENT
Start: 2023-10-17 | End: 2023-10-19

## 2023-10-17 RX ORDER — IBUPROFEN 200 MG
16 TABLET ORAL
Status: DISCONTINUED | OUTPATIENT
Start: 2023-10-17 | End: 2023-10-20 | Stop reason: HOSPADM

## 2023-10-17 RX ORDER — FOLIC ACID 1 MG/1
1 TABLET ORAL DAILY
Status: DISCONTINUED | OUTPATIENT
Start: 2023-10-17 | End: 2023-10-20 | Stop reason: HOSPADM

## 2023-10-17 RX ORDER — ACETAMINOPHEN 500 MG
1000 TABLET ORAL
Status: COMPLETED | OUTPATIENT
Start: 2023-10-17 | End: 2023-10-17

## 2023-10-17 RX ORDER — ACETAMINOPHEN 325 MG/1
650 TABLET ORAL EVERY 4 HOURS PRN
Status: DISCONTINUED | OUTPATIENT
Start: 2023-10-17 | End: 2023-10-20 | Stop reason: HOSPADM

## 2023-10-17 RX ORDER — OXYCODONE HYDROCHLORIDE 5 MG/1
5 TABLET ORAL EVERY 6 HOURS PRN
Status: DISCONTINUED | OUTPATIENT
Start: 2023-10-17 | End: 2023-10-20 | Stop reason: HOSPADM

## 2023-10-17 RX ORDER — NALOXONE HCL 0.4 MG/ML
0.02 VIAL (ML) INJECTION
Status: DISCONTINUED | OUTPATIENT
Start: 2023-10-17 | End: 2023-10-20 | Stop reason: HOSPADM

## 2023-10-17 RX ORDER — PREDNISONE 5 MG/1
10 TABLET ORAL DAILY
Status: DISCONTINUED | OUTPATIENT
Start: 2023-10-17 | End: 2023-10-20 | Stop reason: HOSPADM

## 2023-10-17 RX ORDER — RIFAMPIN 300 MG/1
600 CAPSULE ORAL DAILY
Status: DISCONTINUED | OUTPATIENT
Start: 2023-10-17 | End: 2023-10-20 | Stop reason: HOSPADM

## 2023-10-17 RX ORDER — GLUCAGON 1 MG
1 KIT INJECTION
Status: DISCONTINUED | OUTPATIENT
Start: 2023-10-17 | End: 2023-10-20 | Stop reason: HOSPADM

## 2023-10-17 RX ORDER — TALC
6 POWDER (GRAM) TOPICAL NIGHTLY PRN
Status: DISCONTINUED | OUTPATIENT
Start: 2023-10-17 | End: 2023-10-20 | Stop reason: HOSPADM

## 2023-10-17 RX ORDER — SODIUM CHLORIDE, SODIUM LACTATE, POTASSIUM CHLORIDE, CALCIUM CHLORIDE 600; 310; 30; 20 MG/100ML; MG/100ML; MG/100ML; MG/100ML
INJECTION, SOLUTION INTRAVENOUS CONTINUOUS
Status: DISCONTINUED | OUTPATIENT
Start: 2023-10-17 | End: 2023-10-17

## 2023-10-17 RX ORDER — IBUPROFEN 200 MG
24 TABLET ORAL
Status: DISCONTINUED | OUTPATIENT
Start: 2023-10-17 | End: 2023-10-20 | Stop reason: HOSPADM

## 2023-10-17 RX ORDER — TAMSULOSIN HYDROCHLORIDE 0.4 MG/1
1 CAPSULE ORAL NIGHTLY
Status: DISCONTINUED | OUTPATIENT
Start: 2023-10-17 | End: 2023-10-20 | Stop reason: HOSPADM

## 2023-10-17 RX ORDER — SODIUM CHLORIDE 0.9 % (FLUSH) 0.9 %
10 SYRINGE (ML) INJECTION EVERY 12 HOURS PRN
Status: DISCONTINUED | OUTPATIENT
Start: 2023-10-17 | End: 2023-10-20 | Stop reason: HOSPADM

## 2023-10-17 RX ORDER — SULFASALAZINE 500 MG/1
1000 TABLET ORAL 2 TIMES DAILY
Status: DISCONTINUED | OUTPATIENT
Start: 2023-10-17 | End: 2023-10-20 | Stop reason: HOSPADM

## 2023-10-17 RX ORDER — SODIUM CHLORIDE 9 MG/ML
INJECTION, SOLUTION INTRAVENOUS CONTINUOUS
Status: DISCONTINUED | OUTPATIENT
Start: 2023-10-17 | End: 2023-10-20 | Stop reason: HOSPADM

## 2023-10-17 RX ORDER — ENOXAPARIN SODIUM 100 MG/ML
40 INJECTION SUBCUTANEOUS EVERY 24 HOURS
Status: DISCONTINUED | OUTPATIENT
Start: 2023-10-17 | End: 2023-10-20 | Stop reason: HOSPADM

## 2023-10-17 RX ADMIN — OXYCODONE HYDROCHLORIDE 5 MG: 5 TABLET ORAL at 12:10

## 2023-10-17 RX ADMIN — PREDNISONE 10 MG: 5 TABLET ORAL at 09:10

## 2023-10-17 RX ADMIN — RIFAMPIN 600 MG: 300 CAPSULE ORAL at 10:10

## 2023-10-17 RX ADMIN — TAMSULOSIN HYDROCHLORIDE 0.4 MG: 0.4 CAPSULE ORAL at 11:10

## 2023-10-17 RX ADMIN — MAGNESIUM SULFATE HEPTAHYDRATE 2 G: 40 INJECTION, SOLUTION INTRAVENOUS at 07:10

## 2023-10-17 RX ADMIN — SODIUM CHLORIDE 1000 ML: 9 INJECTION, SOLUTION INTRAVENOUS at 07:10

## 2023-10-17 RX ADMIN — HYDROXYCHLOROQUINE SULFATE 300 MG: 200 TABLET, FILM COATED ORAL at 10:10

## 2023-10-17 RX ADMIN — SODIUM CHLORIDE: 9 INJECTION, SOLUTION INTRAVENOUS at 10:10

## 2023-10-17 RX ADMIN — FOLIC ACID 1 MG: 1 TABLET ORAL at 09:10

## 2023-10-17 RX ADMIN — ACETAMINOPHEN 1000 MG: 500 TABLET ORAL at 03:10

## 2023-10-17 RX ADMIN — POTASSIUM BICARBONATE 40 MEQ: 391 TABLET, EFFERVESCENT ORAL at 03:10

## 2023-10-17 RX ADMIN — SODIUM CHLORIDE: 9 INJECTION, SOLUTION INTRAVENOUS at 12:10

## 2023-10-17 RX ADMIN — SULFASALAZINE 1000 MG: 500 TABLET ORAL at 11:10

## 2023-10-17 RX ADMIN — CEFTRIAXONE 1 G: 1 INJECTION, POWDER, FOR SOLUTION INTRAMUSCULAR; INTRAVENOUS at 02:10

## 2023-10-17 RX ADMIN — SULFASALAZINE 1000 MG: 500 TABLET ORAL at 10:10

## 2023-10-17 RX ADMIN — SODIUM CHLORIDE, POTASSIUM CHLORIDE, SODIUM LACTATE AND CALCIUM CHLORIDE 2500 ML: 600; 310; 30; 20 INJECTION, SOLUTION INTRAVENOUS at 01:10

## 2023-10-17 NOTE — ED TRIAGE NOTES
"Pt presents to ED from [facility]. Pt presents with disla catheter upon arrival, stating it has been "stopped up" since September 10th. Pt reports he gets catheter replaced every month.  "

## 2023-10-17 NOTE — PROGRESS NOTES
[facility]  Urology Progress Note  10/17/2023     Contacted by primary team regarding urinary retention and poor catheter drainage. Patient has chronic indwelling Fernandez that is replaced every month.    Previous catheter placed 10/10/23. Presented to ED from nursing home due to decreased drainage and suprapubic pain. Fernandez replaced by ED 10/17/23 with return of cloudy urine.    Current UA from new Fernandez showed >100 RBC, >100 WBC, many bacteria.   - No leukocytosis, WBC 8.24  - No JOSE MANUEL, Cr at baseline 0.7  - Urine output not recorded, >1000 ml of concentrated clear urine in  bag    Recommend follow up with [facility] [place] [place]. Please maintain Fernandez catheter upon discharge.    - Recommend broad spectrum antibiotics, tailor to cultures  - Follow up urine and blood cultures  - Transfusion per primary  - Strict I&Os  - Monitor for post-obstructive diuresis  - Replace lytes prn per primary  - Rest of care per primary    Melquiades Guerrero MD  [facility] - PGY3

## 2023-10-17 NOTE — ED NOTES
Pt identifiers Chiragpascual Rosssonchecked and correct  LOC: The patient is awake, alert, aware of environment with an appropriate affect. Oriented x3, speaking appropriately  APPEARANCE: Pt resting comfortably, in no acute distress, pt is clean and well groomed, clothing properly fastened  Fernandez catheter patent draining meredith colored urine  SKIN: Skin warm, dry and intact, normal skin turgor, moist mucus membranes Wound noted to sacral area Mepilex dressing in place   RESPIRATORY: Airway is open and patent, respirations are spontaneous, even and unlabored, normal effort and rate  CARDIAC: Normal rate and rhythm, no peripheral edema noted, capillary refill < 3 seconds, bilateral radial pulses 2+  Patient in on a cardiac monitor and pulse oximetry   ABDOMEN: Soft, nontender, nondistended. Bowel sounds present   NEUROLOGIC: PERRL, facial expression is symmetrical, patient moving all extremities spontaneously, normal sensation in all extremities when touched with a finger.  Follows all commands appropriately  MUSCULOSKELETAL: Hand contracted

## 2023-10-17 NOTE — ASSESSMENT & PLAN NOTE
Urinary retention due to benign prostatic hyperplasia  Chronic indwelling Disla catheter  Urinary tract infection associated with indwelling urethral catheter    63M hx of urinary retention 2/2 BPH p/w abdominal pain and distension from disla obstruction. Reported waking on 10/16 with suprapubic fullness and pain with no urine output from disla catheter. He says they would not exchange the disla at NH. Pt denies seeing any blood clots or hematuria. In the ED, pt tachycardic and had his Disla exchanged with immediate improvement in his symptoms. Nurse reports seeing what appeared to be sediment/debris, prior to exchanging Disla. UA with >100wbcs and many bacteria. On exam, no suprapubic distension or tenderness noted. Disla obstruction was likely due to precipitated mineral salts, worsened in the setting of hypercalcemia and possible UTI with urea-splitting bacteria. Pt feeling much better s/p disla exchange.    Plan:  - Disla draining clear yellow urine appropriately  - Encouraged adequate hydration to minimize risk, but difficult situation to manage. Will likely require proactive efforts from patient and nursing staff to recognize blocked disla catheter and when an exchange is needed.  - Blockage may have been precipitated or made worse by presence of urea-splitting organisms in urine.  - Continue home Flomax  - Continue NS 200cc/hr

## 2023-10-17 NOTE — ASSESSMENT & PLAN NOTE
The patient has hypercalcemia that is currently uncontrolled. The patient has the following symptoms due to their hypercalcemia: asymptomatic at present, however may be contributing to disla obstruction. The hypercalcemia is likely due to poor oral intake and dehydration, and bedbound status. We will obtain the following labs to work up the hypercalcemia:   PTH   PTH, Intact   Date Value Ref Range Status   10/07/2023 12.9 9.0 - 77.0 pg/mL Final    . We will treat the hypercalcemia with: IV fluids ordered at a rate of 200cc ml/hr. Their latest calcium has been reviewed and is listed below.  Ionized Calcium   Date Value Ref Range Status   10/07/2023 1.65 (H) 1.06 - 1.42 mmol/L Final       - Continue fluids with NS 200cc/hr  - encourage oral rehydration  - Consider initiation of bisphosphonates or calcitonin for chronic hypercalcemia

## 2023-10-17 NOTE — ASSESSMENT & PLAN NOTE
Severe RA of the hands and wrist joints, with some involvement in bilateral ankles as well. Follows with Rheum at [facility] and receives regular, weekly injections of DMARDs.    - Continue current RA regimen of HCQ, sulfasalazine, prednisone while admitted here  - PRN pain regimen

## 2023-10-17 NOTE — ED NOTES
Telemetry box received and battery replaced for box number [id number]. Box number called into telemetry.

## 2023-10-17 NOTE — HOSPITAL COURSE
Disla placed for urinary retention 2/2 BPH in September. Pt was instructed to follow up with [facility] but was lost to f/u. Hypercalcemia treated with IV hydration. OSH workup showed SPEP WNL and peripheral smear unremarkable; B2 microglobulin, light chains, IgG, and IgA elevated; Heme/Onc consulted. Patient refusing voiding trial inpatient and will be discharged with disla and plans to follow up outpatient.

## 2023-10-17 NOTE — H&P
[facility] - Emergency Dept  [facility]  History & Physical    Patient Name: Chirag Beckham  MRN: [id number]  Patient Class: IP- Inpatient  Admission Date: 10/16/2023  Attending Physician: Christos Rojas MD   Primary Care Provider: Miguel Gardner MD         Patient information was obtained from patient, past medical records and ER records.     Subjective:     Principal Problem:Obstruction of Disla catheter    Chief Complaint:   Chief Complaint   Patient presents with    Urinary Retention     Arrives via EMS from [facility]. Indwelling catheter in place but was not draining. EMS states now currently draining , but sediment noted.         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 Pt presented from [facility] due to disla obstruction. Patient reports waking up morning of 10/16 and having suprapubic fullness and pain with no urine output from disla catheter. He says that nursing staff would not exchange the disla at NH. Pt denies seeing any blood clots or hematuria. He received the disla on 10/10 after recent hospitalization on 10/6, which was also due to needing his disla catheter replaced after presenting with lower abdominal tenderness and distension. In the ED, pt tachycardic and had his Disla exchanged with immediate improvement in his symptoms. Nurse reports seeing what appeared to be sediment/debris, prior to exchanging Disla. Patient denies any other symptoms on ROS asides from arthralgias and joint swelling.    In the ED, pt hemodynamically stable and normotensive. Tachycardic with HR up to 135, stable on room air. Pt had disla exchanged in the ED with immediate improvement in symptoms after urine was drained. Urine studies and culture obtained. Sepsis workup was initiated with fluid bolus of lactated  Ringer's. UA positive for many bacteria and leukocytes, patient given 1 g ceftriaxone. Other labs notable for Hgb 6.8, slightly decreased from baseline. Patient was transfused 1U pRBCs. CMP notable for K 3.2, albumin 1.8, and elevated calcium at 11.9 which is similar to his baseline. Patient admitted to Hospital medicine for further management of UTI and hypercalcemia.      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 NOTE: AFB from 7/17/2023 grew M. fortuitum. AFB smear was negative and all other smears and cultures have been negative to date. This organism is common in water and soil and represents either a transient infection or contamination (most likely the latter given no other evidence of it in sputum) and is not clinically significant.    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 diagnosed July 2023 Mostly in hands, wrists; and (to a lesser degree) in feet and ankles    Tobacco user 9/22/2023    Urinary retention due to benign prostatic hyperplasia 9/22/2023       History reviewed. No pertinent surgical history.    Review of patient's allergies indicates:  No Known Allergies    No current facility-administered medications on file prior to encounter.     Current Outpatient Medications on File Prior to Encounter   Medication Sig    acetaminophen (TYLENOL) 325 MG tablet Take 975 mg by mouth every 6 (six) hours as needed for Pain.    diclofenac sodium (VOLTAREN) 1 % Gel Apply 2 g topically 4 (four) times daily as needed (hand pain).    famotidine (PEPCID) 20 MG tablet Take 20 mg by  mouth 2 (two) times daily.    folic acid (FOLVITE) 1 MG tablet Take 1 mg by mouth once daily.    hydroxychloroquine 300 mg Tab Take 300 mg by mouth once daily.    melatonin (MELATIN) 5 mg Take 5 mg by mouth nightly.    methotrexate, PF, 12.5 mg/0.5 mL Syrg Inject 25 mg into the skin every Sunday. Hold until cephalexin course finished.    mirtazapine (REMERON) 15 MG tablet Take 15 mg by mouth every evening.    multivitamin with minerals tablet Take 1 tablet by mouth once daily.    oxyCODONE (ROXICODONE) 5 MG immediate release tablet Take 5 mg by mouth every 6 (six) hours as needed for Pain.    polyethylene glycol (GLYCOLAX) 17 gram PwPk Take 1 packet by mouth once daily.    predniSONE (DELTASONE) 10 MG tablet Take 10 mg by mouth every evening.    rifAMpin (RIFADIN) 300 MG capsule Take 600 mg by mouth once daily.    sulfaSALAzine (AZULFIDINE) 500 mg Tab Take 1,000 mg by mouth 2 (two) times daily.    tamsulosin (FLOMAX) 0.4 mg Cap Take 1 capsule by mouth nightly.    thiamine 100 MG tablet Take 1 tablet by mouth every evening.    tiZANidine (ZANAFLEX) 2 MG tablet Take 2 mg by mouth every 6 (six) hours as needed (spasms).     Family History    None       Tobacco Use    Smoking status: Not on file    Smokeless tobacco: Not on file   Substance and Sexual Activity    Alcohol use: Not on file    Drug use: Not on file    Sexual activity: Not on file     Review of Systems   Constitutional:  Negative for chills and fever.   HENT:  Negative for congestion and sore throat.    Eyes:  Negative for photophobia and visual disturbance.   Respiratory:  Negative for cough and shortness of breath.    Cardiovascular:  Negative for chest pain and palpitations.   Gastrointestinal:  Positive for abdominal pain. Negative for constipation.   Genitourinary:  Positive for decreased urine volume and difficulty urinating. Negative for dysuria and hematuria.   Musculoskeletal:  Positive for arthralgias and joint swelling. Negative for back pain.    Skin:  Negative for rash and wound.   Neurological:  Negative for dizziness and syncope.   Psychiatric/Behavioral:  Negative for agitation and behavioral problems.      Objective:     Vital Signs (Most Recent):  Temp: 97.8 °F (36.6 °C) (10/17/23 0618)  Pulse: 101 (10/17/23 0651)  Resp: 15 (10/17/23 0651)  BP: 104/70 (10/17/23 0632)  SpO2: 100 % (10/17/23 0651) Vital Signs (24h Range):  Temp:  [97.8 °F (36.6 °C)-99.7 °F (37.6 °C)] 97.8 °F (36.6 °C)  Pulse:  [] 101  Resp:  [12-33] 15  SpO2:  [95 %-100 %] 100 %  BP: ()/(58-86) 104/70     Weight: 81.6 kg (180 lb)  Body mass index is 25.83 kg/m².     Physical Exam  Vitals reviewed.   Constitutional:       General: He is not in acute distress.     Appearance: Normal appearance. He is ill-appearing.      Comments: Thin, frail appearing older male   HENT:      Head: Normocephalic and atraumatic.      Nose: No congestion or rhinorrhea.      Mouth/Throat:      Mouth: Mucous membranes are dry.      Pharynx: Oropharynx is clear.      Comments: Poor dentition  Cardiovascular:      Rate and Rhythm: Normal rate and regular rhythm.      Pulses: Normal pulses.      Heart sounds: Normal heart sounds.   Pulmonary:      Effort: Pulmonary effort is normal. No respiratory distress.      Breath sounds: Normal breath sounds.   Abdominal:      General: Bowel sounds are normal.      Palpations: Abdomen is soft.      Tenderness: There is no abdominal tenderness.   Genitourinary:     Comments: Fernandez draining appropriately  No genital abnormalities, no erythema at glans or discharge  Musculoskeletal:         General: Swelling, tenderness and deformity present.      Cervical back: Full passive range of motion without pain and normal range of motion.      Comments: Joint deformities noted bilateral wrists, with swelling and warmth on palpation   Skin:     General: Skin is dry.      Comments: Xerosis noted on extremities   Neurological:      General: No focal deficit present.       Mental Status: He is alert and oriented to person, place, and time.   Psychiatric:         Mood and Affect: Mood normal.         Behavior: Behavior normal.                Significant Labs: All pertinent labs within the past 24 hours have been reviewed.  CBC:   Recent Labs   Lab 10/17/23  0128 10/17/23  0504   WBC 9.17 8.24   HGB 6.8* 6.0*   HCT 23.2* 20.6*    302     CMP:   Recent Labs   Lab 10/17/23  0128 10/17/23  0504    137   K 3.2* 3.5    103   CO2 27 26   * 199*   BUN 8 8   CREATININE 0.7 0.7   CALCIUM 11.9* 11.3*   PROT 8.2 7.1   ALBUMIN 1.8* 1.6*   BILITOT 0.4 0.2   ALKPHOS 91 81   AST 12 9*   ALT 8* 6*   ANIONGAP 10 8       Significant Imaging: I have reviewed all pertinent imaging results/findings within the past 24 hours.    Assessment/Plan:     * Obstruction of Disla catheter  Urinary retention due to benign prostatic hyperplasia  Chronic indwelling Disla catheter  Urinary tract infection associated with indwelling urethral catheter    63M hx of urinary retention 2/2 BPH p/w abdominal pain and distension from disla obstruction. Reported waking on 10/16 with suprapubic fullness and pain with no urine output from disla catheter. He says they would not exchange the disla at NH. Pt denies seeing any blood clots or hematuria. In the ED, pt tachycardic and had his Disla exchanged with immediate improvement in his symptoms. Nurse reports seeing what appeared to be sediment/debris, prior to exchanging Disla. UA with >100wbcs and many bacteria. On exam, no suprapubic distension or tenderness noted. Disla obstruction was likely due to precipitated mineral salts, worsened in the setting of hypercalcemia and possible UTI with urea-splitting bacteria. Pt feeling much better s/p disla exchange.    Plan:  - Disla draining clear yellow urine appropriately  - Encouraged adequate hydration to minimize risk, but difficult situation to manage. Will likely require proactive efforts from patient and  nursing staff to recognize blocked disla catheter and when an exchange is needed.  - Blockage may have been precipitated or made worse by presence of urea-splitting organisms in urine.  - Continue home Flomax  - Continue NS 200cc/hr    Hypercalcemia  The patient has hypercalcemia that is currently uncontrolled. The patient has the following symptoms due to their hypercalcemia: asymptomatic at present, however may be contributing to disla obstruction. The hypercalcemia is likely due to poor oral intake and dehydration, and bedbound status. We will obtain the following labs to work up the hypercalcemia:   PTH   PTH, Intact   Date Value Ref Range Status   10/07/2023 12.9 9.0 - 77.0 pg/mL Final    . We will treat the hypercalcemia with: IV fluids ordered at a rate of 200cc ml/hr. Their latest calcium has been reviewed and is listed below.  Ionized Calcium   Date Value Ref Range Status   10/07/2023 1.65 (H) 1.06 - 1.42 mmol/L Final       - Continue fluids with NS 200cc/hr  - encourage oral rehydration  - Consider initiation of bisphosphonates or calcitonin for chronic hypercalcemia    Hypokalemia  Noted to be hypokalemic on past admissions and current admission, replete as needed    Rheumatoid arthritis involving multiple sites with positive rheumatoid factor  Severe RA of the hands and wrist joints, with some involvement in bilateral ankles as well. Follows with Rheum at [facility] and receives regular, weekly injections of DMARDs.    - Continue current RA regimen of HCQ, sulfasalazine, prednisone while admitted here  - PRN pain regimen    Latent tuberculosis  On past admission, pt had AFB smear which resulted as Mycobacterium Fortuitum. Repeat AFB was negative. Growth on smear appeared nontuberculous, so low concern for harboring resistance; pt started rifampin per ID recs on July 20th, duration 16 wks, so EOT 11/20/2023 for Latent TB. No respiratory symptoms at present.    - Continue Rifampin 600mg qd with EOT on 11/20/23,  per OSH documentation.      VTE Risk Mitigation (From admission, onward)           Ordered     enoxaparin injection 40 mg  Daily         10/17/23 0452     IP VTE HIGH RISK PATIENT  Once         10/17/23 0452     Place sequential compression device  Until discontinued         10/17/23 0452                               Marcello Pillai MD  Department of Hospital Medicine  [facility] - Emergency Dept

## 2023-10-17 NOTE — ASSESSMENT & PLAN NOTE
See ACP 10/17. Very difficult to prognosticate without diagnosis and patient with some resistance to goals of care conversation as it remains unclear why he continues to decline.     -remains full code, full support  -recommend continued workup of hypercalcemia and anemia as may lead to a diagnosis that could help shape future context-driven goals of care conversations  -patient designates his sister, Jeanine as his surrogate decision-maker  -will engage SW to assist with MPOA form  -also appreciate spiritual care involvement to help support patient- not a part of a yo community but cites he relationship with god as a source of strength

## 2023-10-17 NOTE — ASSESSMENT & PLAN NOTE
Severe RA of the hands and wrist joints, with some involvement in bilateral ankles as well. Follows with Rheum at [facility] and receives regular, weekly injections of DMARDs.    - Continue current RA regimen of HCQ, sulfasalazine, prednisone while admitted here  - PRN pain regimen

## 2023-10-17 NOTE — ASSESSMENT & PLAN NOTE
Corrected calcium >13 on admit, no overt symptoms although has acute on chronic bony pains from RA. Also with comorbid anemia but no renal dysfunction. PTH normal. Could be related to immobility    -reasonable to treat, although underlying etiology unclear  -has had myeloma workup recently at [facility] which was unrevealing  -may warrant hematology consult julian in the setting of anemia

## 2023-10-17 NOTE — ED PROVIDER NOTES
Encounter Date: 10/16/2023       History     Chief Complaint   Patient presents with    Urinary Retention     Arrives via EMS from [facility] [address] [facility]. Indwelling catheter in place but was not draining. EMS states now currently draining , but sediment noted.      This is a 63-year-old man presenting from [facility] with an indwelling catheter who is complaining of suprapubic pain and urinary retention.  He says the nursing home did not change out his Fernandez or bag and he was in severe pain, but upon arrival to the emergency department his Fernandez was manipulated and a new bag was placed, with resolution of his symptoms.  He has a history of benign prostatic hyperplasia with urinary retention and has a chronic indwelling Fernandez catheter.  He completed a 5 day course of cephalexin for a UTI after being admitted from October 6 through October 9th.  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    The history is provided by the patient.     Review of patient's allergies indicates:  No Known Allergies  Past Medical History:   Diagnosis Date    Anemia 9/22/2023    BPH with obstruction/lower urinary tract symptoms 9/17/2023    Dyspepsia 9/22/2023    Iron deficiency anemia 6/13/2023    Latent tuberculosis 10/6/2023    Formatting of this note might be different from the original. NOTE: AFB from 7/17/2023 grew M. fortuitum. AFB smear was negative and all other smears and cultures have been negative to date. This organism is common in water and soil and represents either a transient infection or contamination (most likely the latter given no other evidence of it in sputum) and is not clinically significant.    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 diagnosed July 2023  Mostly in hands, wrists; and (to a lesser degree) in feet and ankles    Tobacco user 9/22/2023    Urinary retention due to benign prostatic hyperplasia 9/22/2023     History reviewed. No pertinent surgical history.  History reviewed. No pertinent family history.     Review of Systems    Physical Exam     Initial Vitals [10/16/23 2011]   BP Pulse Resp Temp SpO2   133/86 99 18 98.7 °F (37.1 °C) 100 %      MAP       --         Physical Exam    Constitutional: He appears well-developed. No distress.   HENT:   Head: Normocephalic and atraumatic.   Nose: Nose normal.   Mouth/Throat: No oropharyngeal exudate.   Poor dentition   Eyes: Conjunctivae and EOM are normal. Pupils are equal, round, and reactive to light. Right eye exhibits no discharge. Left eye exhibits no discharge. No scleral icterus.   Neck:   Normal range of motion.  Cardiovascular:  Normal rate, regular rhythm, normal heart sounds and intact distal pulses.           Pulmonary/Chest: Breath sounds normal. No respiratory distress. He has no wheezes.   Abdominal: Abdomen is soft. Bowel sounds are normal. He exhibits no distension. There is no abdominal tenderness.   No suprapubic pain to deep palpation   Genitourinary:    Penis normal.      Genitourinary Comments: No irritation, erythema, or swelling around Fernandez insertion into glans penis.  Penis not tender to palpation.  Fernandez collection system with orange urine and some sediments present     Musculoskeletal:         General: Normal range of motion.      Cervical back: Normal range of motion.      Comments: Severe arthritic changes noted, with deviations in risk, all digits in the hands, all toes bent inferiorly     Neurological: He is alert and oriented to person, place, and time. He has normal strength. No cranial nerve deficit.   Skin: Skin is warm and dry.   Psychiatric: He has a normal mood and affect.         ED Course   Procedures  Labs Reviewed   URINALYSIS, REFLEX TO URINE CULTURE - Abnormal; Notable  for the following components:       Result Value    Appearance, UA Cloudy (*)     Protein, UA 2+ (*)     Leukocytes, UA 1+ (*)     All other components within normal limits    Narrative:     Specimen Source->Urine   CBC W/ AUTO DIFFERENTIAL - Abnormal; Notable for the following components:    RBC 2.55 (*)     Hemoglobin 6.8 (*)     Hematocrit 23.2 (*)     MCH 26.7 (*)     MCHC 29.3 (*)     RDW 16.6 (*)     All other components within normal limits   COMPREHENSIVE METABOLIC PANEL - Abnormal; Notable for the following components:    Potassium 3.2 (*)     Glucose 120 (*)     Calcium 11.9 (*)     Albumin 1.8 (*)     ALT 8 (*)     All other components within normal limits   URINALYSIS MICROSCOPIC - Abnormal; Notable for the following components:    RBC, UA >100 (*)     WBC, UA >100 (*)     Bacteria Many (*)     All other components within normal limits    Narrative:     Specimen Source->Urine   INFLUENZA A & B BY MOLECULAR   CULTURE, BLOOD   CULTURE, BLOOD   CULTURE, URINE   SARS-COV-2 RNA AMPLIFICATION, QUAL   TYPE & SCREEN   PREPARE RBC SOFT          Imaging Results              X-Ray Chest AP Portable (Final result)  Result time 10/16/23 23:48:07      Final result by Edward Kenney MD (10/16/23 23:48:07)                   Impression:      Radiographic findings suggestive underlying emphysematous change.  No new large confluent airspace consolidation identified.      Electronically signed by: Edward Kenney MD  Date:    10/16/2023  Time:    23:48               Narrative:    EXAMINATION:  XR CHEST AP PORTABLE    CLINICAL HISTORY:  Sepsis;    TECHNIQUE:  Single frontal view of the chest was performed.    COMPARISON:  10/06/2023    FINDINGS:  Cardiac silhouette is not significantly enlarged.  Stable configuration of the hilar contours.  Lungs demonstrate coarse interstitial attenuation with findings suggestive underlying emphysematous change.  No new large confluent airspace consolidation identified.  No significant  volume of pleural fluid or definite pneumothorax appreciated.  Osseous structures demonstrate degenerative changes.  Surgical clips project over the left axilla.                                       Medications   cefTRIAXone (ROCEPHIN) 1 g in dextrose 5 % in water (D5W) 100 mL IVPB (MB+) (1 g Intravenous New Bag 10/17/23 0251)   0.9%  NaCl infusion (for blood administration) (has no administration in time range)   potassium bicarbonate disintegrating tablet 40 mEq (has no administration in time range)   acetaminophen tablet 1,000 mg (has no administration in time range)     Medical Decision Making  This is a 63-year-old male presenting from a nursing home for urinary retention and suprapubic pain with an indwelling Fernandez catheter.  On admission his Fernandez was manipulated and bag changed, with expression of a large amount of urine that was orange in color with sedimentation present.  He was recently treated for 5 days with cephalexin for UTI and completed the antibiotics 2 days ago.     Initially patient was hemodynamically stable with soft blood pressure and heart rate close to 100, and it was plan to do a urine culture and exchange his Fernandez.  Prior to exchanging Fernandez, he became tachycardic with heart rate in the 130s, so sepsis workup was initiated with fluid bolus of lactated Ringer's.    Urinalysis positive for many bacteria and leukocytes, patient given 1 g ceftriaxone.  CBC with anemia hemoglobin is 6.8, slightly decreased from baseline.  Patient was consented and 1 unit of packed red blood cells was ordered with type and screen.  CMP remarkable for low potassium, 40 mEq ordered, albumin 1.8, and elevated calcium at 11.9 which is consistent with chronic hypercalcemia noted on previous recent admissions.    At 3:00 a.m., patient was rechecked and was noted to have chills, and a new onset low-grade fever at 99.7 F. Tylenol ordered. Planning admission for further workup of urosepsis.      Amount and/or  Complexity of Data Reviewed  External Data Reviewed: notes.     Details: Reviewed note from previous admission 10/6 - 10/9  Labs: ordered.     Details: CBC, CMP, lactate, urinalysis, type and screen  Radiology: ordered.     Details: Chest xray: Radiographic findings suggestive underlying emphysematous change.  No new large confluent airspace consolidation identified.  ECG/medicine tests: ordered and independent interpretation performed.     Details: , sinus tachycardia    Risk  OTC drugs.  Prescription drug management.                               Clinical Impression:   Final diagnoses:  [T83.511D, N39.0] Urinary tract infection associated with indwelling urethral catheter, subsequent encounter (Primary)  [R00.0] Tachycardia  [A41.9] Sepsis, due to unspecified organism, unspecified whether acute organ dysfunction present  [D64.9] Anemia, unspecified type               Lopez Prieto MD  Resident  10/17/23 4267

## 2023-10-17 NOTE — SUBJECTIVE & OBJECTIVE
Interval History: Chart reviewed including 24h medication use. Patient resting comfortably in bed, awake and interactive. No family present at bedside.       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 NOTE: AFB from 7/17/2023 grew M. fortuitum. AFB smear was negative and all other smears and cultures have been negative to date. This organism is common in water and soil and represents either a transient infection or contamination (most likely the latter given no other evidence of it in sputum) and is not clinically significant.    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 diagnosed July 2023 Mostly in hands, wrists; and (to a lesser degree) in feet and ankles    Tobacco user 9/22/2023    Urinary retention due to benign prostatic hyperplasia 9/22/2023       History reviewed. No pertinent surgical history.    Review of patient's allergies indicates:  No Known Allergies    Medications:  Continuous Infusions:   sodium chloride 0.9% 200 mL/hr at 10/17/23 1255     Scheduled Meds:   enoxparin  40 mg Subcutaneous Daily    folic acid  1 mg Oral Daily    hydroxychloroquine  300 mg Oral Daily    predniSONE  10 mg Oral Daily    rifAMpin  600 mg Oral Daily    sulfaSALAzine  1,000 mg Oral BID    tamsulosin  1 capsule Oral Nightly     PRN Meds:0.9%  NaCl infusion (for blood administration), acetaminophen, dextrose 10%, dextrose 10%, diclofenac sodium, glucagon (human recombinant), glucose, glucose, melatonin, naloxone, oxyCODONE, sodium chloride 0.9%    Family History    None       Tobacco Use     Smoking status: Not on file    Smokeless tobacco: Not on file   Substance and Sexual Activity    Alcohol use: Not on file    Drug use: Not on file    Sexual activity: Not on file     Objective:     Vital Signs (Most Recent):  Temp: 98.2 °F (36.8 °C) (10/17/23 1500)  Pulse: (!) 115 (10/17/23 1500)  Resp: 14 (10/17/23 1500)  BP: 112/65 (10/17/23 1500)  SpO2: 97 % (10/17/23 1500) Vital Signs (24h Range):  Temp:  [96 °F (35.6 °C)-99.7 °F (37.6 °C)] 98.2 °F (36.8 °C)  Pulse:  [] 115  Resp:  [12-33] 14  SpO2:  [93 %-100 %] 97 %  BP: ()/(58-86) 112/65     Weight: 81.6 kg (180 lb)  Body mass index is 25.83 kg/m².       Physical Exam  Vitals and nursing note reviewed.   Constitutional:       Appearance: He is ill-appearing.      Comments: Chronically ill-appearing older male lying in bed, in no acute distress, awake and conversant   Skin:     General: Skin is warm and dry.   Neurological:      General: No focal deficit present.      Mental Status: He is alert and oriented to person, place, and time.   Psychiatric:         Mood and Affect: Mood normal.         Behavior: Behavior normal.      Comments: Somewhat withdrawn             Advance Care Planning   Advance Directives:     Decision Making:  Patient answered questions  Goals of Care: Supportive conversation with patient at bedside. Discussed his overall understanding of his decline over the past few months. He has limited insight but notes that out of the hospital, he is not longer able to do much for himself and is frustrated that no one has been able to tell him why is he getting sicker. I expressed that I shared his frustration and that while we would continue to explore why he is declining medically, I also worried that despite all the care he is getting, he may continue to worsen and could potentially be nearing the end of his life. He does not seem surprised by this statement but expresses that he continues to hope that we can find a way to get him  "better and live longer. When asked what would be most important if we didn't have a way to get him better, he states he doesn't know. I also attempted to explore what limits, if any, he would place on his care in the future if he were to get so ill that we thought he might pass. For now, he states that he would want to be rescuscitated and put on life support "for a little while" and trust that his sister, Jeanine, would make the right decisions about his care.        CBC:   Recent Labs   Lab 10/17/23  0504   WBC 8.24   HGB 6.0*   HCT 20.6*   MCV 92        BMP:  Recent Labs   Lab 10/17/23  0504   *      K 3.5      CO2 26   BUN 8   CREATININE 0.7   CALCIUM 11.3*   MG 1.6     LFT:  Lab Results   Component Value Date    AST 9 (L) 10/17/2023    ALKPHOS 81 10/17/2023    BILITOT 0.2 10/17/2023     Albumin:   Albumin   Date Value Ref Range Status   10/17/2023 1.6 (L) 3.5 - 5.2 g/dL Final     Protein:   Total Protein   Date Value Ref Range Status   10/17/2023 7.1 6.0 - 8.4 g/dL Final     Lactic acid:   Lab Results   Component Value Date    LACTATE 1.1 10/07/2023         "

## 2023-10-17 NOTE — SUBJECTIVE & OBJECTIVE
Interval History: NAEON. Pt refused AM labs. Pt poor understanding of why disla was placed or when he was supposed to follow up with [facility].     Review of Systems   Constitutional:  Negative for chills and fever.   HENT:  Negative for congestion and sore throat.    Eyes:  Negative for photophobia and visual disturbance.   Respiratory:  Negative for cough and shortness of breath.    Cardiovascular:  Negative for chest pain and palpitations.   Gastrointestinal:  Negative for abdominal pain and constipation.   Genitourinary:  Negative for decreased urine volume, difficulty urinating, dysuria and hematuria.   Musculoskeletal:  Positive for arthralgias and joint swelling. Negative for back pain.   Skin:  Negative for rash and wound.   Neurological:  Negative for dizziness and syncope.   Psychiatric/Behavioral:  Negative for agitation and behavioral problems.      Objective:     Vital Signs (Most Recent):  Temp: 97.8 °F (36.6 °C) (10/17/23 0618)  Pulse: 101 (10/17/23 0651)  Resp: 15 (10/17/23 0651)  BP: 104/70 (10/17/23 0632)  SpO2: 100 % (10/17/23 0651) Vital Signs (24h Range):  Temp:  [97.8 °F (36.6 °C)-99.7 °F (37.6 °C)] 97.8 °F (36.6 °C)  Pulse:  [] 101  Resp:  [12-33] 15  SpO2:  [95 %-100 %] 100 %  BP: ()/(58-86) 104/70     Weight: 81.6 kg (180 lb)  Body mass index is 25.83 kg/m².     Physical Exam  Vitals reviewed.   Constitutional:       General: He is not in acute distress.     Appearance: Normal appearance. He is ill-appearing.   HENT:      Head: Normocephalic and atraumatic.      Nose: No congestion or rhinorrhea.      Mouth/Throat:      Mouth: Mucous membranes are dry.      Pharynx: Oropharynx is clear.      Comments: Poor dentition  Cardiovascular:      Rate and Rhythm: Normal rate and regular rhythm.      Pulses: Normal pulses.      Heart sounds: Normal heart sounds.   Pulmonary:      Effort: Pulmonary effort is normal. No respiratory distress.      Breath sounds: Normal breath sounds.    Abdominal:      General: Bowel sounds are normal.      Palpations: Abdomen is soft.      Tenderness: There is no abdominal tenderness.   Genitourinary:     Comments: Fernandez draining appropriately.  Musculoskeletal:         General: Swelling, tenderness and deformity present.      Cervical back: Full passive range of motion without pain and normal range of motion.      Comments: Joint deformities noted bilateral wrists, with swelling and warmth on palpation   Skin:     General: Skin is warm and dry.      Comments: Xerosis noted on extremities   Neurological:      General: No focal deficit present.      Mental Status: He is alert and oriented to person, place, and time.   Psychiatric:         Mood and Affect: Mood normal.         Behavior: Behavior normal.                Significant Labs: All pertinent labs within the past 24 hours have been reviewed.  CBC:   Recent Labs   Lab 10/17/23  0128 10/17/23  0504   WBC 9.17 8.24   HGB 6.8* 6.0*   HCT 23.2* 20.6*    302     CMP:   Recent Labs   Lab 10/17/23  0128 10/17/23  0504    137   K 3.2* 3.5    103   CO2 27 26   * 199*   BUN 8 8   CREATININE 0.7 0.7   CALCIUM 11.9* 11.3*   PROT 8.2 7.1   ALBUMIN 1.8* 1.6*   BILITOT 0.4 0.2   ALKPHOS 91 81   AST 12 9*   ALT 8* 6*   ANIONGAP 10 8       Significant Imaging: I have reviewed all pertinent imaging results/findings within the past 24 hours.      Significant Labs: I have reviewed all pertinent lab results/findings within the past 24 hours.

## 2023-10-17 NOTE — HPI
"Chirag Beckham is a 63 year old man with urinary retention, seropositive erosive RA affecting bilateral hands and feet, chronic hypercalcemia, and latent TB who presented from [facility] due to disla obstruction. Patient denied any other symptoms on ROS asides from arthralgias and joint swelling. Initial workup revealed anemia, hypercalcemia, hypoalbuminemia.  Palliative care consulted to assist with goals of care in the setting of multiple hospital admits ([facility] and [facility]) over the past several months for "sepsis, failure to thrive, and hypercalcemia/ anemia).  "

## 2023-10-17 NOTE — CONSULTS
[facility] - Emergency Dept  Palliative Medicine  Consult Note    Patient Name: Chirag Beckham  MRN: [id number]  Admission Date: 10/16/2023  Hospital Length of Stay: 1 days  Code Status: Full Code   Attending Provider: Christos Rojas MD  Consulting Provider: Buster Heredia MD  Primary Care Physician: Miguel Gardner MD  Principal Problem:Obstruction of Fernandez catheter    Patient information was obtained from patient, past medical records and primary team.      Inpatient consult to Palliative Care  Consult performed by: Buster Heredia MD  Consult ordered by: Beatrice Guerin MD        Assessment/Plan:     Renal/  Hypercalcemia  Corrected calcium >13 on admit, no overt symptoms although has acute on chronic bony pains from RA. Also with comorbid anemia but no renal dysfunction. PTH normal. Could be related to immobility    -reasonable to treat, although underlying etiology unclear  -has had myeloma workup recently at [facility] which was unrevealing  -may warrant hematology consult julian in the setting of anemia    Immunology/Multi System  Rheumatoid arthritis involving multiple sites with positive rheumatoid factor  Seropositive erosive disease with significant pain, follows with Rheum at [facility]    Palliative Care  Palliative care encounter  See ACP 10/17. Very difficult to prognosticate without diagnosis and patient with some resistance to goals of care conversation as it remains unclear why he continues to decline.     -remains full code, full support  -recommend continued workup of hypercalcemia and anemia as may lead to a diagnosis that could help shape future context-driven goals of care conversations  -patient designates his sister, Jeanine as his surrogate decision-maker  -will engage SW to assist with MPOA form  -also appreciate spiritual care involvement to help support patient- not a part of a yo community but cites he relationship with god as a source of strength        Thank you for your consult. I  "will follow-up with patient. Please contact us if you have any additional questions.    Subjective:     HPI:   Chirag Beckham is a 63 year old man with urinary retention, seropositive erosive RA affecting bilateral hands and feet, chronic hypercalcemia, and latent TB who presented from [facility] due to disla obstruction. Patient denied any other symptoms on ROS asides from arthralgias and joint swelling. Initial workup revealed anemia, hypercalcemia, hypoalbuminemia.  Palliative care consulted to assist with goals of care in the setting of multiple hospital admits ([facility] and [facility]) over the past several months for "sepsis, failure to thrive, and hypercalcemia/ anemia).      Interval History: Chart reviewed including 24h medication use. Patient resting comfortably in bed, awake and interactive. No family present at bedside.       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 NOTE: AFB from 7/17/2023 grew M. fortuitum. AFB smear was negative and all other smears and cultures have been negative to date. This organism is common in water and soil and represents either a transient infection or contamination (most likely the latter given no other evidence of it in sputum) and is not clinically significant.    Physical deconditioning 9/18/2023    Polyarthritis 6/13/2023    Primary insomnia 9/22/2023    Rheumatoid arthritis involving multiple sites with positive rheumatoid factor 7/12/2023    Formatting of this note might be different from the original. diagnosed July 2023 Mostly in hands, wrists; and (to a lesser degree) in feet and ankles    Tobacco user 9/22/2023    Urinary retention due to benign prostatic hyperplasia 9/22/2023       History reviewed. No pertinent " surgical history.    Review of patient's allergies indicates:  No Known Allergies    Medications:  Continuous Infusions:   sodium chloride 0.9% 200 mL/hr at 10/17/23 1255     Scheduled Meds:   enoxparin  40 mg Subcutaneous Daily    folic acid  1 mg Oral Daily    hydroxychloroquine  300 mg Oral Daily    predniSONE  10 mg Oral Daily    rifAMpin  600 mg Oral Daily    sulfaSALAzine  1,000 mg Oral BID    tamsulosin  1 capsule Oral Nightly     PRN Meds:0.9%  NaCl infusion (for blood administration), acetaminophen, dextrose 10%, dextrose 10%, diclofenac sodium, glucagon (human recombinant), glucose, glucose, melatonin, naloxone, oxyCODONE, sodium chloride 0.9%    Family History    None       Tobacco Use    Smoking status: Not on file    Smokeless tobacco: Not on file   Substance and Sexual Activity    Alcohol use: Not on file    Drug use: Not on file    Sexual activity: Not on file     Objective:     Vital Signs (Most Recent):  Temp: 98.2 °F (36.8 °C) (10/17/23 1500)  Pulse: (!) 115 (10/17/23 1500)  Resp: 14 (10/17/23 1500)  BP: 112/65 (10/17/23 1500)  SpO2: 97 % (10/17/23 1500) Vital Signs (24h Range):  Temp:  [96 °F (35.6 °C)-99.7 °F (37.6 °C)] 98.2 °F (36.8 °C)  Pulse:  [] 115  Resp:  [12-33] 14  SpO2:  [93 %-100 %] 97 %  BP: ()/(58-86) 112/65     Weight: 81.6 kg (180 lb)  Body mass index is 25.83 kg/m².       Physical Exam  Vitals and nursing note reviewed.   Constitutional:       Appearance: He is ill-appearing.      Comments: Chronically ill-appearing older male lying in bed, in no acute distress, awake and conversant   Skin:     General: Skin is warm and dry.   Neurological:      General: No focal deficit present.      Mental Status: He is alert and oriented to person, place, and time.   Psychiatric:         Mood and Affect: Mood normal.         Behavior: Behavior normal.      Comments: Somewhat withdrawn             Advance Care Planning  Advance Directives:     Decision Making:  Patient  "answered questions  Goals of Care: Supportive conversation with patient at bedside. Discussed his overall understanding of his decline over the past few months. He has limited insight but notes that out of the hospital, he is not longer able to do much for himself and is frustrated that no one has been able to tell him why is he getting sicker. I expressed that I shared his frustration and that while we would continue to explore why he is declining medically, I also worried that despite all the care he is getting, he may continue to worsen and could potentially be nearing the end of his life. He does not seem surprised by this statement but expresses that he continues to hope that we can find a way to get him better and live longer. When asked what would be most important if we didn't have a way to get him better, he states he doesn't know. I also attempted to explore what limits, if any, he would place on his care in the future if he were to get so ill that we thought he might pass. For now, he states that he would want to be rescuscitated and put on life support "for a little while" and trust that his sister, Jeanine, would make the right decisions about his care.        CBC:   Recent Labs   Lab 10/17/23  0504   WBC 8.24   HGB 6.0*   HCT 20.6*   MCV 92        BMP:  Recent Labs   Lab 10/17/23  0504   *      K 3.5      CO2 26   BUN 8   CREATININE 0.7   CALCIUM 11.3*   MG 1.6     LFT:  Lab Results   Component Value Date    AST 9 (L) 10/17/2023    ALKPHOS 81 10/17/2023    BILITOT 0.2 10/17/2023     Albumin:   Albumin   Date Value Ref Range Status   10/17/2023 1.6 (L) 3.5 - 5.2 g/dL Final     Protein:   Total Protein   Date Value Ref Range Status   10/17/2023 7.1 6.0 - 8.4 g/dL Final     Lactic acid:   Lab Results   Component Value Date    LACTATE 1.1 10/07/2023             I spent a total of 62 minutes on the day of the visit. This includes face to face time in discussion of goals of care, " symptom assessment, coordination of care and emotional support.  This also includes non-face to face time preparing to see the patient (eg, review of tests/imaging), obtaining and/or reviewing separately obtained history, documenting clinical information in the electronic or other health record ([facility] records), independently interpreting results and communicating results to the patient/family/caregiver, and in discussion with primary team    In addition to above, I spent 20 minutes specifically discussing advance care planning and goals of care with patient at bedside.        Buster Heredia MD  Palliative Medicine  [facility] - Emergency Dept

## 2023-10-17 NOTE — ASSESSMENT & PLAN NOTE
UA with >100 WBCs and >100 RBCs.  Patient asymptomatic after disla exchanged.  Will hold Abx due to asymptomatic presentation.

## 2023-10-17 NOTE — ED NOTES
Nurses Note -- 4 Eyes      10/17/2023   7:10 AM      Skin assessed during: Admit      [] No Altered Skin Integrity Present    [x]Prevention Measures Documented      [x] Yes- Altered Skin Integrity Present or Discovered   [x] LDA Added if Not in Epic (Describe Wound)   [x] New Altered Skin Integrity was Present on Admit and Documented in LDA   [x] Wound Image Taken    Wound Care Consulted? No    Attending Nurse:  Odalys Victoria RN/Staff Member:   Melanie

## 2023-10-17 NOTE — ASSESSMENT & PLAN NOTE
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 Repeat AFB was negative. Growth on smear appeared nontuberculous, so low concern for harboring resistance; pt started rifampin per ID recs on July 20th, duration 16 wks, so EOT 11/20/2023 for Latent TB. No respiratory symptoms at present.    - Continue Rifampin 600mg qd with EOT on 11/20/23, per OSH documentation.

## 2023-10-17 NOTE — HPI
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 Pt presented from [facility] due to disla obstruction. Patient reports waking up morning of 10/16 and having suprapubic fullness and pain with no urine output from disla catheter. He says that nursing staff would not exchange the disla at NH. Pt denies seeing any blood clots or hematuria. He received the disla on 10/10 after recent hospitalization on 10/6, which was also due to needing his disla catheter replaced after presenting with lower abdominal tenderness and distension. In the ED, pt tachycardic and had his Disla exchanged with immediate improvement in his symptoms. Nurse reports seeing what appeared to be sediment/debris, prior to exchanging Disla. Patient denies any other symptoms on ROS asides from arthralgias and joint swelling.    In the ED, pt hemodynamically stable and normotensive. Tachycardic with HR up to 135, stable on room air. Pt had disla exchanged in the ED with immediate improvement in symptoms after urine was drained. Urine studies and culture obtained. Sepsis workup was initiated with fluid bolus of lactated Ringer's. UA positive for many bacteria and leukocytes, patient given 1 g ceftriaxone. Other labs notable for Hgb 6.8, slightly decreased from baseline. Patient was transfused 1U pRBCs. CMP notable for K 3.2, albumin 1.8, and elevated calcium at 11.9 which is similar to his baseline. Patient admitted to Hospital medicine for further management.

## 2023-10-17 NOTE — ASSESSMENT & PLAN NOTE
Urinary retention due to benign prostatic hyperplasia  Chronic indwelling Disla catheter    63M hx of urinary retention 2/2 BPH p/w abdominal pain and distension from disla obstruction. Reported waking on 10/16 with suprapubic fullness and pain with no urine output from disla catheter. He says they would not exchange the disla at NH. Pt denies seeing any blood clots or hematuria. In the ED, pt tachycardic and had his Disla exchanged with immediate improvement in his symptoms. Nurse reports seeing what appeared to be sediment/debris, prior to exchanging Disla. UA with >100wbcs and many bacteria. On exam, no suprapubic distension or tenderness noted. Disla obstruction was likely due to precipitated mineral salts, worsened in the setting of hypercalcemia and possible UTI with urea-splitting bacteria. Pt feeling much better s/p disla exchange.    Plan:  - Disla draining clear yellow urine appropriately  - Encouraged adequate hydration to minimize risk, but difficult situation to manage. Will likely require proactive efforts from patient and nursing staff to recognize blocked disla catheter and when an exchange is needed.  - Continue home Flomax  - Continue NS 200cc/hr while inpt

## 2023-10-17 NOTE — ASSESSMENT & PLAN NOTE
Supposed to follow up with [facility] for disla removal but was lost to f/u.  Urology consulted while inpatient; rec'ed following outpt with [facility].  Continue flomax.

## 2023-10-17 NOTE — PHARMACY MED REC
"Admission Medication History     The home medication history was taken by Prince Aguilar.    You may go to "Admission" then "Reconcile Home Medications" tabs to review and/or act upon these items.     The home medication list has been updated by the Pharmacy department.   Please read ALL comments highlighted in yellow.   Please address this information as you see fit.    Feel free to contact us if you have any questions or require assistance.      Medications listed below were obtained from: Nursing home  Current Outpatient Medications on File Prior to Encounter   Medication Sig    acetaminophen (TYLENOL) 325 MG tablet Take 975 mg by mouth every 6 (six) hours as needed for Pain.    diclofenac sodium (VOLTAREN) 1 % Gel Apply 2 g topically 4 (four) times daily as needed (hand pain). (Do not apply to open wounds, eyes or mucus membrane)    famotidine (PEPCID) 20 MG tablet Take 20 mg by mouth 2 (two) times daily.    folic acid (FOLVITE) 1 MG tablet Take 1 mg by mouth once daily.    hydroxychloroquine 300 mg Tab Take 300 mg by mouth once daily.    melatonin (MELATIN) 5 mg Take 5 mg by mouth nightly.    methotrexate, PF, 12.5 mg/0.5 mL Syrg Inject 25 mg into the skin every Sunday. Hold until cephalexin course finished.    mirtazapine (REMERON) 15 MG tablet Take 15 mg by mouth every evening.    MULTIVITAMIN WITH IRON ORAL Take 1 tablet by mouth once daily.    oxyCODONE (ROXICODONE) 5 MG immediate release tablet Take 5 mg by mouth every 6 (six) hours as needed for Pain.    polyethylene glycol (GLYCOLAX) 17 gram PwPk Mix 17 gm with 6 to 8 ounces of water and take daily.    predniSONE (DELTASONE) 10 MG tablet Take 10 mg by mouth every evening.    rifAMpin (RIFADIN) 300 MG capsule Take 600 mg by mouth once daily.    sulfaSALAzine (AZULFIDINE) 500 mg Tab Take 1,000 mg by mouth 2 (two) times daily.    tamsulosin (FLOMAX) 0.4 mg Cap Take 1 capsule by mouth nightly.    thiamine 100 MG tablet Take 1 tablet by mouth every evening.    " tiZANidine (ZANAFLEX) 2 MG tablet Take 2 mg by mouth every 6 (six) hours as needed (spasms).     Potential issues to be addressed PRIOR TO DISCHARGE  The listed medications were obtained from another facility ([facility]). The patient may not have been able to fill these prescriptions prior to this admission and may require new scripts upon discharge.             Prince Aguilar  [id number]                  .

## 2023-10-18 LAB
ALBUMIN SERPL BCP-MCNC: 1.5 G/DL (ref 3.5–5.2)
ALP SERPL-CCNC: 68 U/L (ref 55–135)
ALT SERPL W/O P-5'-P-CCNC: 7 U/L (ref 10–44)
ANION GAP SERPL CALC-SCNC: 6 MMOL/L (ref 8–16)
AST SERPL-CCNC: 12 U/L (ref 10–40)
B2 MICROGLOB SERPL-MCNC: 5 UG/ML (ref 0–2.5)
BACTERIA UR CULT: NORMAL
BASOPHILS # BLD AUTO: 0.01 K/UL (ref 0–0.2)
BASOPHILS NFR BLD: 0.2 % (ref 0–1.9)
BILIRUB SERPL-MCNC: 0.2 MG/DL (ref 0.1–1)
BLD PROD TYP BPU: NORMAL
BLOOD UNIT EXPIRATION DATE: NORMAL
BLOOD UNIT TYPE CODE: 600
BLOOD UNIT TYPE: NORMAL
BUN SERPL-MCNC: 5 MG/DL (ref 8–23)
CALCIUM SERPL-MCNC: 9.8 MG/DL (ref 8.7–10.5)
CHLORIDE SERPL-SCNC: 109 MMOL/L (ref 95–110)
CO2 SERPL-SCNC: 24 MMOL/L (ref 23–29)
CODING SYSTEM: NORMAL
CREAT SERPL-MCNC: 0.6 MG/DL (ref 0.5–1.4)
CROSSMATCH INTERPRETATION: NORMAL
DIFFERENTIAL METHOD: ABNORMAL
DISPENSE STATUS: NORMAL
EOSINOPHIL # BLD AUTO: 0.1 K/UL (ref 0–0.5)
EOSINOPHIL NFR BLD: 1.4 % (ref 0–8)
ERYTHROCYTE [DISTWIDTH] IN BLOOD BY AUTOMATED COUNT: 16.2 % (ref 11.5–14.5)
EST. GFR  (NO RACE VARIABLE): >60 ML/MIN/1.73 M^2
GLUCOSE SERPL-MCNC: 129 MG/DL (ref 70–110)
HCT VFR BLD AUTO: 22.8 % (ref 40–54)
HGB BLD-MCNC: 6.6 G/DL (ref 14–18)
IGA SERPL-MCNC: 586 MG/DL (ref 40–350)
IGG SERPL-MCNC: 2091 MG/DL (ref 650–1600)
IGM SERPL-MCNC: 264 MG/DL (ref 50–300)
IMM GRANULOCYTES # BLD AUTO: 0.02 K/UL (ref 0–0.04)
IMM GRANULOCYTES NFR BLD AUTO: 0.4 % (ref 0–0.5)
LDH SERPL L TO P-CCNC: 142 U/L (ref 110–260)
LYMPHOCYTES # BLD AUTO: 1.5 K/UL (ref 1–4.8)
LYMPHOCYTES NFR BLD: 26.7 % (ref 18–48)
MAGNESIUM SERPL-MCNC: 1.5 MG/DL (ref 1.6–2.6)
MCH RBC QN AUTO: 26.9 PG (ref 27–31)
MCHC RBC AUTO-ENTMCNC: 28.9 G/DL (ref 32–36)
MCV RBC AUTO: 93 FL (ref 82–98)
MONOCYTES # BLD AUTO: 0.6 K/UL (ref 0.3–1)
MONOCYTES NFR BLD: 9.9 % (ref 4–15)
NEUTROPHILS # BLD AUTO: 3.4 K/UL (ref 1.8–7.7)
NEUTROPHILS NFR BLD: 61.4 % (ref 38–73)
NRBC BLD-RTO: 0 /100 WBC
PHOSPHATE SERPL-MCNC: 2.4 MG/DL (ref 2.7–4.5)
PLATELET # BLD AUTO: 241 K/UL (ref 150–450)
PMV BLD AUTO: 8.6 FL (ref 9.2–12.9)
POTASSIUM SERPL-SCNC: 3.2 MMOL/L (ref 3.5–5.1)
PROT SERPL-MCNC: 6.6 G/DL (ref 6–8.4)
RBC # BLD AUTO: 2.45 M/UL (ref 4.6–6.2)
SODIUM SERPL-SCNC: 139 MMOL/L (ref 136–145)
TRANS ERYTHROCYTES VOL PATIENT: NORMAL ML
WBC # BLD AUTO: 5.55 K/UL (ref 3.9–12.7)

## 2023-10-18 PROCEDURE — P9021 RED BLOOD CELLS UNIT: HCPCS

## 2023-10-18 PROCEDURE — 63600175 PHARM REV CODE 636 W HCPCS

## 2023-10-18 PROCEDURE — 25000003 PHARM REV CODE 250

## 2023-10-18 PROCEDURE — 99232 SBSQ HOSP IP/OBS MODERATE 35: CPT | Mod: ,,, | Performed by: INTERNAL MEDICINE

## 2023-10-18 PROCEDURE — 83615 LACTATE (LD) (LDH) ENZYME: CPT

## 2023-10-18 PROCEDURE — 83521 IG LIGHT CHAINS FREE EACH: CPT

## 2023-10-18 PROCEDURE — 99497 ADVNCD CARE PLAN 30 MIN: CPT | Mod: ,,, | Performed by: STUDENT IN AN ORGANIZED HEALTH CARE EDUCATION/TRAINING PROGRAM

## 2023-10-18 PROCEDURE — 21400001 HC TELEMETRY ROOM

## 2023-10-18 PROCEDURE — 36430 TRANSFUSION BLD/BLD COMPNT: CPT

## 2023-10-18 PROCEDURE — 82397 CHEMILUMINESCENT ASSAY: CPT

## 2023-10-18 PROCEDURE — 83735 ASSAY OF MAGNESIUM: CPT

## 2023-10-18 PROCEDURE — 99233 SBSQ HOSP IP/OBS HIGH 50: CPT | Mod: ,,, | Performed by: STUDENT IN AN ORGANIZED HEALTH CARE EDUCATION/TRAINING PROGRAM

## 2023-10-18 PROCEDURE — 84100 ASSAY OF PHOSPHORUS: CPT

## 2023-10-18 PROCEDURE — 86920 COMPATIBILITY TEST SPIN: CPT

## 2023-10-18 PROCEDURE — 82784 ASSAY IGA/IGD/IGG/IGM EACH: CPT | Mod: 59

## 2023-10-18 PROCEDURE — 85025 COMPLETE CBC W/AUTO DIFF WBC: CPT

## 2023-10-18 PROCEDURE — 99233 PR SUBSEQUENT HOSPITAL CARE,LEVL III: ICD-10-PCS | Mod: ,,, | Performed by: STUDENT IN AN ORGANIZED HEALTH CARE EDUCATION/TRAINING PROGRAM

## 2023-10-18 PROCEDURE — 99497 PR ADVNCD CARE PLAN 30 MIN: ICD-10-PCS | Mod: ,,, | Performed by: STUDENT IN AN ORGANIZED HEALTH CARE EDUCATION/TRAINING PROGRAM

## 2023-10-18 PROCEDURE — 99232 PR SUBSEQUENT HOSPITAL CARE,LEVL II: ICD-10-PCS | Mod: ,,, | Performed by: INTERNAL MEDICINE

## 2023-10-18 PROCEDURE — 80053 COMPREHEN METABOLIC PANEL: CPT

## 2023-10-18 PROCEDURE — 82232 ASSAY OF BETA-2 PROTEIN: CPT

## 2023-10-18 RX ORDER — HYDROCODONE BITARTRATE AND ACETAMINOPHEN 500; 5 MG/1; MG/1
TABLET ORAL
Status: DISCONTINUED | OUTPATIENT
Start: 2023-10-18 | End: 2023-10-20 | Stop reason: HOSPADM

## 2023-10-18 RX ORDER — MAGNESIUM SULFATE HEPTAHYDRATE 40 MG/ML
2 INJECTION, SOLUTION INTRAVENOUS ONCE
Status: COMPLETED | OUTPATIENT
Start: 2023-10-18 | End: 2023-10-18

## 2023-10-18 RX ORDER — POTASSIUM CHLORIDE 20 MEQ/1
40 TABLET, EXTENDED RELEASE ORAL
Status: COMPLETED | OUTPATIENT
Start: 2023-10-18 | End: 2023-10-18

## 2023-10-18 RX ADMIN — SODIUM CHLORIDE: 9 INJECTION, SOLUTION INTRAVENOUS at 01:10

## 2023-10-18 RX ADMIN — ENOXAPARIN SODIUM 40 MG: 40 INJECTION SUBCUTANEOUS at 06:10

## 2023-10-18 RX ADMIN — POTASSIUM CHLORIDE 40 MEQ: 1500 TABLET, EXTENDED RELEASE ORAL at 05:10

## 2023-10-18 RX ADMIN — SULFASALAZINE 1000 MG: 500 TABLET ORAL at 12:10

## 2023-10-18 RX ADMIN — POTASSIUM CHLORIDE 40 MEQ: 1500 TABLET, EXTENDED RELEASE ORAL at 10:10

## 2023-10-18 RX ADMIN — MAGNESIUM SULFATE HEPTAHYDRATE 2 G: 40 INJECTION, SOLUTION INTRAVENOUS at 04:10

## 2023-10-18 RX ADMIN — SODIUM CHLORIDE: 9 INJECTION, SOLUTION INTRAVENOUS at 07:10

## 2023-10-18 RX ADMIN — FOLIC ACID 1 MG: 1 TABLET ORAL at 10:10

## 2023-10-18 RX ADMIN — SODIUM CHLORIDE: 9 INJECTION, SOLUTION INTRAVENOUS at 08:10

## 2023-10-18 RX ADMIN — HYDROXYCHLOROQUINE SULFATE 300 MG: 200 TABLET, FILM COATED ORAL at 12:10

## 2023-10-18 RX ADMIN — RIFAMPIN 600 MG: 300 CAPSULE ORAL at 04:10

## 2023-10-18 RX ADMIN — PREDNISONE 10 MG: 5 TABLET ORAL at 10:10

## 2023-10-18 RX ADMIN — SODIUM CHLORIDE: 9 INJECTION, SOLUTION INTRAVENOUS at 12:10

## 2023-10-18 RX ADMIN — TAMSULOSIN HYDROCHLORIDE 0.4 MG: 0.4 CAPSULE ORAL at 10:10

## 2023-10-18 RX ADMIN — SULFASALAZINE 1000 MG: 500 TABLET ORAL at 10:10

## 2023-10-18 NOTE — PROGRESS NOTES
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 Pt presented from [facility] due to disla obstruction. Patient reports waking up morning of 10/16 and having suprapubic fullness and pain with no urine output from disla catheter. He says that nursing staff would not exchange the disla at NH. Pt denies seeing any blood clots or hematuria. He received the disla on 10/10 after recent hospitalization on 10/6, which was also due to needing his disla catheter replaced after presenting with lower abdominal tenderness and distension. In the ED, pt tachycardic and had his Disla exchanged with immediate improvement in his symptoms. Nurse reports seeing what appeared to be sediment/debris, prior to exchanging Disla. Patient denies any other symptoms on ROS asides from arthralgias and joint swelling.    In the ED, pt hemodynamically stable and normotensive. Tachycardic with HR up to 135, stable on room air. Pt had disla exchanged in the ED with immediate improvement in symptoms after urine was drained. Urine studies and culture obtained. Sepsis workup was initiated with fluid bolus of lactated Ringer's. UA positive for many bacteria and leukocytes, patient given 1 g ceftriaxone. Other labs notable for Hgb 6.8, slightly decreased from baseline. Patient was transfused 1U pRBCs. CMP notable for K 3.2, albumin 1.8, and elevated calcium at 11.9 which is similar to his baseline.  Patient admitted to Hospital medicine for further management.      Overview/Hospital Course:  Disla placed for urinary retention 2/2 BPH in September. Pt was instructed to follow up with [facility] but was lost to f/u. Hypercalcemia possibly contributing to debris and disla obstruction. OSH workup showed SPEP WNL and peripheral smear unremarkable; LDH, light chains, immunoglobulins and UPEP pending this admission. Pt intermittently refusing labs.       Interval History: NAEON. Pt refused AM labs. Pt poor understanding of why disla was placed or when he was supposed to follow up with [facility].     Review of Systems   Constitutional:  Negative for chills and fever.   HENT:  Negative for congestion and sore throat.    Eyes:  Negative for photophobia and visual disturbance.   Respiratory:  Negative for cough and shortness of breath.    Cardiovascular:  Negative for chest pain and palpitations.   Gastrointestinal:  Negative for abdominal pain and constipation.   Genitourinary:  Negative for decreased urine volume, difficulty urinating, dysuria and hematuria.   Musculoskeletal:  Positive for arthralgias and joint swelling. Negative for back pain.   Skin:  Negative for rash and wound.   Neurological:  Negative for dizziness and syncope.   Psychiatric/Behavioral:  Negative for agitation and behavioral problems.      Objective:     Vital Signs (Most Recent):  Temp: 97.8 °F (36.6 °C) (10/17/23 0618)  Pulse: 101 (10/17/23 0651)  Resp: 15 (10/17/23 0651)  BP: 104/70 (10/17/23 0632)  SpO2: 100 % (10/17/23 0651) Vital Signs (24h Range):  Temp:  [97.8 °F (36.6 °C)-99.7 °F (37.6 °C)] 97.8 °F (36.6 °C)  Pulse:  [] 101  Resp:  [12-33] 15  SpO2:  [95 %-100 %] 100 %  BP: ()/(58-86) 104/70     Weight: 81.6 kg (180 lb)  Body mass index is 25.83 kg/m².     Physical Exam  Vitals reviewed.   Constitutional:       General: He is not in acute distress.     Appearance: Normal appearance. He is ill-appearing.   HENT:       Head: Normocephalic and atraumatic.      Nose: No congestion or rhinorrhea.      Mouth/Throat:      Mouth: Mucous membranes are dry.      Pharynx: Oropharynx is clear.      Comments: Poor dentition  Cardiovascular:      Rate and Rhythm: Normal rate and regular rhythm.      Pulses: Normal pulses.      Heart sounds: Normal heart sounds.   Pulmonary:      Effort: Pulmonary effort is normal. No respiratory distress.      Breath sounds: Normal breath sounds.   Abdominal:      General: Bowel sounds are normal.      Palpations: Abdomen is soft.      Tenderness: There is no abdominal tenderness.   Genitourinary:     Comments: Fernandez draining appropriately.  Musculoskeletal:         General: Swelling, tenderness and deformity present.      Cervical back: Full passive range of motion without pain and normal range of motion.      Comments: Joint deformities noted bilateral wrists, with swelling and warmth on palpation   Skin:     General: Skin is warm and dry.      Comments: Xerosis noted on extremities   Neurological:      General: No focal deficit present.      Mental Status: He is alert and oriented to person, place, and time.   Psychiatric:         Mood and Affect: Mood normal.         Behavior: Behavior normal.                Significant Labs: All pertinent labs within the past 24 hours have been reviewed.  CBC:   Recent Labs   Lab 10/17/23  0128 10/17/23  0504   WBC 9.17 8.24   HGB 6.8* 6.0*   HCT 23.2* 20.6*    302     CMP:   Recent Labs   Lab 10/17/23  0128 10/17/23  0504    137   K 3.2* 3.5    103   CO2 27 26   * 199*   BUN 8 8   CREATININE 0.7 0.7   CALCIUM 11.9* 11.3*   PROT 8.2 7.1   ALBUMIN 1.8* 1.6*   BILITOT 0.4 0.2   ALKPHOS 91 81   AST 12 9*   ALT 8* 6*   ANIONGAP 10 8       Significant Imaging: I have reviewed all pertinent imaging results/findings within the past 24 hours.      Significant Labs: I have reviewed all pertinent lab results/findings within the past 24  hours.    Assessment/Plan:      * Obstruction of Disla catheter  Urinary retention due to benign prostatic hyperplasia  Chronic indwelling Disla catheter    63M hx of urinary retention 2/2 BPH p/w abdominal pain and distension from disla obstruction. Reported waking on 10/16 with suprapubic fullness and pain with no urine output from disla catheter. He says they would not exchange the disla at NH. Pt denies seeing any blood clots or hematuria. In the ED, pt tachycardic and had his Disla exchanged with immediate improvement in his symptoms. Nurse reports seeing what appeared to be sediment/debris, prior to exchanging Disla. UA with >100wbcs and many bacteria. On exam, no suprapubic distension or tenderness noted. Disla obstruction was likely due to precipitated mineral salts, worsened in the setting of hypercalcemia and possible UTI with urea-splitting bacteria. Pt feeling much better s/p disla exchange.    Plan:  - Disla draining clear yellow urine appropriately  - Encouraged adequate hydration to minimize risk, but difficult situation to manage. Will likely require proactive efforts from patient and nursing staff to recognize blocked disla catheter and when an exchange is needed.  - Continue home Flomax  - Continue NS 200cc/hr while inpt    Bacteriuria  UA with >100 WBCs and >100 RBCs.  Patient asymptomatic after disla exchanged.  Will hold Abx due to asymptomatic presentation.    Hypercalcemia  The patient has hypercalcemia that is currently uncontrolled. The patient has the following symptoms due to their hypercalcemia: asymptomatic at present, however may be contributing to disla obstruction. The hypercalcemia is likely due to poor oral intake and dehydration, and bedbound status. We will obtain the following labs to work up the hypercalcemia:   PTH   PTH, Intact   Date Value Ref Range Status   10/07/2023 12.9 9.0 - 77.0 pg/mL Final    . We will treat the hypercalcemia with: IV fluids ordered at a rate of 200cc  ml/hr. Their latest calcium has been reviewed and is listed below.  Ionized Calcium   Date Value Ref Range Status   10/07/2023 1.65 (H) 1.06 - 1.42 mmol/L Final       - Continue fluids with NS 200cc/hr  - encourage oral rehydration  - Consider initiation of bisphosphonates or calcitonin for chronic hypercalcemia  - Previous SPEP and smear WNL at [facility]; f/u UPEP, immunoglobbulins, light chains as well as PTHr    Hypokalemia  Noted to be hypokalemic on past admissions and current admission, replete as needed    Urinary retention due to benign prostatic hyperplasia  Supposed to follow up with [facility] for disla removal but was lost to f/u.  Urology consulted while inpatient; rec'ed following outpt with [facility].  Continue flomax.      Rheumatoid arthritis involving multiple sites with positive rheumatoid factor  Severe RA of the hands and wrist joints, with some involvement in bilateral ankles as well. Follows with Rheum at [facility] and receives regular, weekly injections of DMARDs.    - Continue current RA regimen of HCQ, sulfasalazine, prednisone while admitted here  - PRN pain regimen    Latent tuberculosis  On past admission, pt had AFB smear which resulted as Mycobacterium Fortuitum. Repeat AFB was negative. Growth on smear appeared nontuberculous, so low concern for harboring resistance; pt started rifampin per ID recs on July 20th, duration 16 wks, so EOT 11/20/2023 for Latent TB. No respiratory symptoms at present.    - Continue Rifampin 600mg qd with EOT on 11/20/23, per OSH documentation.    Iron deficiency anemia  S/p 1u pRBC  Daily CBC, CMP  Transfuse if Hgb < 7.0        VTE Risk Mitigation (From admission, onward)           Ordered     enoxaparin injection 40 mg  Daily         10/17/23 0452     IP VTE HIGH RISK PATIENT  Once         10/17/23 0452     Place sequential compression device  Until discontinued         10/17/23 0452                    Discharge Planning   YUDELKA:      Code Status: Full Code   Is the  patient medically ready for discharge?:     Reason for patient still in hospital (select all that apply): Laboratory test and Pending disposition             Beatrice Guerin MD  Department of Hospital Medicine   [facility] - Intensive Care ([place]-[address])

## 2023-10-18 NOTE — NURSING
Nurses Note -- 4 Eyes      10/18/2023   7:55 AM      Skin assessed during: Admit      [x] No Altered Skin Integrity Present    []Prevention Measures Documented      [] Yes- Altered Skin Integrity Present or Discovered   [] LDA Added if Not in Epic (Describe Wound)   [] New Altered Skin Integrity was Present on Admit and Documented in LDA   [] Wound Image Taken    Wound Care Consulted? No    Attending Nurse:  Sylvia Lopez RN    Second RN/Staff Member:  Es Quinn PCT

## 2023-10-18 NOTE — PLAN OF CARE
"[facility] - Intensive Care ([place] [address])  Initial Discharge Assessment       Primary Care Provider: Miguel Gardner MD    Admission Diagnosis: Tachycardia [R00.0]  Chest pain [R07.9]  Anemia, unspecified type [D64.9]  Urinary tract infection associated with indwelling urethral catheter, subsequent encounter [T83.511D, N39.0]  Sepsis, due to unspecified organism, unspecified whether acute organ dysfunction present [A41.9]    Admission Date: 10/16/2023  Expected Discharge Date:     Transition of Care Barriers: (P) None    Payor: MEDICAID / Plan: [facility] COMMUNITY PLAN [facility] ([place] MEDICAID) / Product Type: Managed Medicaid /     Extended Emergency Contact Information  Primary Emergency Contact: Jeanine Beckham  Home Phone: [phone]  Mobile Phone: [phone]  Relation: Sister  Secondary Emergency Contact: Ericka Beckham  [facility] Phone: [phone]  Relation: Sister    Discharge Plan A: (P) Return to nursing home  Discharge Plan B: (P) Community Services      [facility] [address] [place], [place] - [address]  [facility] [address]  Phone: [phone] Fax: [phone]      Initial Assessment (most recent)       Adult Discharge Assessment - 10/18/23 1538          Discharge Assessment    Assessment Type Discharge Planning Assessment     Confirmed/corrected address, phone number and insurance Yes     Confirmed Demographics Correct on Facesheet (P)      Source of Information patient (P)      When was your last doctors appointment? -- (P)    "I don't remember"    Reason For Admission "Arthritis and pain" (P)      Facility Arrived From: [facility]  shelter resident (P)      Do you expect to return to your current living situation? Yes (P)      Do you have help at home or someone to help you manage your care at home? Yes (P)      Who are your caregiver(s) and their phone number(s)? Mandi  (P)      Prior to hospitilization cognitive status: Alert/Oriented " "(P)      Current cognitive status: Alert/Oriented (P)      Home Accessibility wheelchair accessible (P)      Home Layout Able to live on 1st floor (P)      Readmission within 30 days? No (P)      Patient currently being followed by outpatient case management? No (P)      Do you currently have service(s) that help you manage your care at home? Yes (P)      Name and Contact number of agency [facility] (P)      Do you take prescription medications? Yes (P)      Do you have prescription coverage? Yes (P)      Coverage MEDICAID - [facility] COMMUNITY PLAN [facility] ([place] MEDICAID) (P)      Do you have any problems affording any of your prescribed medications? No (P)      Is the patient taking medications as prescribed? yes (P)      How do you get to doctors appointments? agency (P)      Are you on dialysis? No (P)      Do you take coumadin? No (P)      DME Needed Upon Discharge  none (P)      Discharge Plan discussed with: Patient (P)      Transition of Care Barriers None (P)      SDOH Housing/economic concerns;Lack of primary/family support (P)    History of homelessness    Discharge Plan A Return to nursing home (P)      Discharge Plan B Community Services (P)         Financial Resource Strain    How hard is it for you to pay for the very basics like food, housing, medical care, and heating? Hard (P)         Housing Stability    In the last 12 months, was there a time when you were not able to pay the mortgage or rent on time? No (P)      In the last 12 months, how many places have you lived? 2 (P)    Patient was homeless "living on street" prior to admitting to [facility] 1 month ago.    In the last 12 months, was there a time when you did not have a steady place to sleep or slept in a shelter (including now)? Yes (P)         Transportation Needs    In the past 12 months, has lack of transportation kept you from medical appointments or from getting medications? No (P)      In the past 12 months, has lack of " transportation kept you from meetings, work, or from getting things needed for daily living? No (P)         Food Insecurity    Within the past 12 months, you worried that your food would run out before you got the money to buy more. Never true (P)      Within the past 12 months, the food you bought just didn't last and you didn't have money to get more. Never true (P)         Social Connections    How often do you get together with friends or relatives? -- (P)    Once monthly    Are you , , , , never , or living with a partner? Never  (P)         Alcohol Use    Q1: How often do you have a drink containing alcohol? Never (P)      Q2: How many drinks containing alcohol do you have on a typical day when you are drinking? Patient does not drink (P)      Q3: How often do you have six or more drinks on one occasion? Never (P)                                       AMBER Ernst, LMSW  [facility]  Case Management  Ext. [phone]

## 2023-10-18 NOTE — ASSESSMENT & PLAN NOTE
Corrected calcium >13 on admit, no overt symptoms although has acute on chronic bony pains from RA. Also with comorbid anemia but no renal dysfunction. PTH normal. Could be related to immobility     -reasonable to treat, although underlying etiology unclear  -has had myeloma workup recently at [facility] which was unrevealing  -may warrant hematology consult julian in the setting of anemia

## 2023-10-18 NOTE — ASSESSMENT & PLAN NOTE
Seropositive erosive disease with significant pain, follows with Rheum at [facility]. Notes pain is significantly worse in wrists over the past few months    -agree with topical diclofenac  -NSAIDs may be suboptimal, given ongoing anemia  -could consider short course of increased steroids if flares suspected

## 2023-10-18 NOTE — PROGRESS NOTES
[facility] - Intensive Care ([place] [address])  Palliative Medicine  Progress Note    Patient Name: Chirag Beckham  MRN: [id number]  Admission Date: 10/16/2023  Hospital Length of Stay: 1 days  Code Status: Full Code   Attending Provider: Christos Rojas MD  Consulting Provider: Buster Heredia MD  Primary Care Physician: Mgiuel Gardner MD  Principal Problem:Obstruction of Fernandez catheter    Patient information was obtained from patient, past medical records and primary team.      Assessment/Plan:     Renal/  Hypercalcemia  Corrected calcium >13 on admit, no overt symptoms although has acute on chronic bony pains from RA. Also with comorbid anemia but no renal dysfunction. PTH normal. Could be related to immobility     -reasonable to treat, although underlying etiology unclear  -has had myeloma workup recently at [facility] which was unrevealing  -may warrant hematology consult julian in the setting of anemia    Immunology/Multi System  Rheumatoid arthritis involving multiple sites with positive rheumatoid factor  Seropositive erosive disease with significant pain, follows with Rheum at [facility]. Notes pain is significantly worse in wrists over the past few months    -agree with topical diclofenac  -NSAIDs may be suboptimal, given ongoing anemia  -could consider short course of increased steroids if flares suspected    Palliative Care  Palliative care encounter  See ACP 10/17-10/18. Very difficult to prognosticate without diagnosis and patient with some resistance to goals of care conversation as it remains unclear why he continues to decline.     -remains full code, full support   -recommend continued workup of hypercalcemia and anemia as may lead to a diagnosis that could help shape future context-driven goals of care conversations  -patient designates his sister, Jeanine as his surrogate decision-maker  -will engage SW to assist with MPOA form  -also appreciate palliative care  to help support patient- not a part of  "a yo community but cites he relationship with god as a source of strength        I will follow-up with patient. Please contact us if you have any additional questions.    Subjective:     Chief Complaint:   Chief Complaint   Patient presents with    Urinary Retention     Arrives via EMS from [facility]. Indwelling catheter in place but was not draining. EMS states now currently draining , but sediment noted.        HPI:   Chirag Beckham is a 63 year old man with urinary retention, seropositive erosive RA affecting bilateral hands and feet, chronic hypercalcemia, and latent TB who presented from [facility] due to disla obstruction. Patient denied any other symptoms on [facility] asides from arthralgias and joint swelling. Initial workup revealed anemia, hypercalcemia, hypoalbuminemia.  Palliative care consulted to assist with goals of care in the setting of multiple hospital admits ([facility] and [facility]) over the past several months for "sepsis, failure to thrive, and hypercalcemia/ anemia).      Interval History: Chart reviewed including 24h medication use. Patient continues to complain of bilateral wrist pain which he notes has worsened over the past month. Ongoing goals of care noted below      Medications:  Continuous Infusions:   sodium chloride 0.9% 200 mL/hr at 10/18/23 1258     Scheduled Meds:   enoxparin  40 mg Subcutaneous Daily    folic acid  1 mg Oral Daily    hydroxychloroquine  300 mg Oral Daily    predniSONE  10 mg Oral Daily    rifAMpin  600 mg Oral Daily    sulfaSALAzine  1,000 mg Oral BID    tamsulosin  1 capsule Oral Nightly     PRN Meds:0.9%  NaCl infusion (for blood administration), acetaminophen, dextrose 10%, dextrose 10%, diclofenac sodium, glucagon (human recombinant), glucose, glucose, melatonin, naloxone, oxyCODONE, sodium chloride 0.9%    Objective:     Vital Signs (Most Recent):  Temp: 98.2 °F (36.8 °C) (10/18/23 1100)  Pulse: 108 (10/18/23 1115)  Resp: 20 (10/18/23 1100)  BP: " "100/64 (10/18/23 1100)  SpO2: 100 % (10/18/23 1115) Vital Signs (24h Range):  Temp:  [97.6 °F (36.4 °C)-98.9 °F (37.2 °C)] 98.2 °F (36.8 °C)  Pulse:  [100-118] 108  Resp:  [14-20] 20  SpO2:  [96 %-100 %] 100 %  BP: (100-114)/(55-72) 100/64     Weight: 81.6 kg (180 lb)  Body mass index is 25.83 kg/m².       Physical Exam  Vitals and nursing note reviewed.   Constitutional:       Appearance: He is ill-appearing.      Comments: Chronically ill-appearing older male lying in bed, in no acute distress, awake and conversant   Skin:     General: Skin is warm and dry.   Neurological:      General: No focal deficit present.      Mental Status: He is alert and oriented to person, place, and time.   Psychiatric:         Mood and Affect: Mood normal.         Behavior: Behavior normal.      Comments: Somewhat withdrawn              Advance Care Planning  Advance Directives:   Goals of Care: Supportive dialogue at bedside. Explored patient's decline further as he shared that 10 months ago he was still independent and working as a  and now he has been stuck in the hospital and "old folks homes". He continues to express frustration that "none of the docs at [facility] or [facility] or here can figure out what's wrong. I thought y'all were the best". I noted that I shared his frustration but promised that we would continue to do our best to find meaningful solutions for his care, acknowledging that we may not find a clear reason for his suffering. For the time being, he continues to agree with the current level of care and notes that he would consider more invasive interventions including short term life support if he continues on this trajectory. I asked him what he hopes for and he notes "to get better and be Chirag again". When I asked what he worries about, he becomes tearful, noting "doc, you know the answer to that; I dont want to say it"       CBC:   Recent Labs   Lab 10/18/23  1109   WBC 5.55   HGB 6.6*   HCT 22.8*   MCV 93 "        BMP:  Recent Labs   Lab 10/18/23  1109   *      K 3.2*      CO2 24   BUN 5*   CREATININE 0.6   CALCIUM 9.8   MG 1.5*     LFT:  Lab Results   Component Value Date    AST 12 10/18/2023    ALKPHOS 68 10/18/2023    BILITOT 0.2 10/18/2023     Albumin:   Albumin   Date Value Ref Range Status   10/18/2023 1.5 (L) 3.5 - 5.2 g/dL Final     Protein:   Total Protein   Date Value Ref Range Status   10/18/2023 6.6 6.0 - 8.4 g/dL Final     Lactic acid:   Lab Results   Component Value Date    LACTATE 1.1 10/07/2023     I spent a total of 52 minutes on the day of the visit. This includes face to face time in symptom assessment, coordination of care and emotional support. This also includes non-face to face time preparing to see the patient (eg, review of tests/imaging), obtaining and/or reviewing separately obtained history, documenting clinical information in the electronic or other health record, independently interpreting results and communicating results to the patient/family/caregiver, or care coordinator.    In addition to above, I spent 20 minutes specifically discussing advance care planning and goals of care with patient at bedside.       Buster Heredia MD  Palliative Medicine  [facility] - Intensive Care ([place] [address])

## 2023-10-18 NOTE — PLAN OF CARE
Advance Care Planning   [facility] ([place] [address])  Palliative Care   Psychosocial Assessment    Patient Name: Chirag Beckham  MRN: [id number]  Admission Date: 10/16/2023  Hospital Length of Stay: 1 days  Code Status: Full Code   Attending Provider: Christos Rojas MD  Palliative Care Provider:   Primary Care Physician: Miguel Gardner MD  Principal Problem:Obstruction of Fernandez catheter    Reason for Referral:  Advanced Care Planning and Psychosocial Support       Present during Interview: patient.      Primary Language:English   Needed: no      Past Medical Situation:   PMH:   Past Medical History:   Diagnosis Date    Anemia 9/22/2023    BPH with obstruction/lower urinary tract symptoms 9/17/2023    Dyspepsia 9/22/2023    Iron deficiency anemia 6/13/2023    Latent tuberculosis 10/6/2023    Formatting of this note might be different from the original. NOTE: AFB from 7/17/2023 grew M. fortuitum. AFB smear was negative and all other smears and cultures have been negative to date. This organism is common in water and soil and represents either a transient infection or contamination (most likely the latter given no other evidence of it in sputum) and is not clinically significant.    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 diagnosed July 2023 Mostly in hands, wrists; and (to a lesser degree) in feet and ankles    Tobacco user 9/22/2023    Urinary retention due to benign prostatic hyperplasia 9/22/2023     Mental Health/Substance Use History: None identified   Risk of Abuse, neglect or exploitation: None identified   Current or Previous Trauma and/or evidence of PTSD: None identified   Non-traditional Health practices: None identified     Understanding of diagnosis and  "prognosis: fair   Experience/Comfort level with health care system: fair     Patients Mental Status: Alert and oriented to person, place, time and situation     Socio-Economic Factors/Resources:  Address: [facility]  [address] [address]  Phone Number: [phone] (home)     Marital Status: Single  Household composition: Nursing home resident   Children: One daughter and one son     Patient/Family perceptions about Caregiving Needs; availability and capacity: Nursing home resident.  Patient reports his sisters do not have the availability in their residences to take patient in.     Family Dynamics/Relationships:Patient reports having good support from two of his sisters. Patient reports although he has contact with his children they "don't want to be bothered".     Patient/Family Strengths/Resilience: Patient is open and cordial.   Patient/Family Coping: Fair.  Patient is understandably frustrated and saddened by his unfortunate circumstances.     Activities of Daily Living: Assistance required due to RA  Support Systems-Family & Community (Home Health, HME etc): n/a     Transportation:  no    Work/Education History: disability  Self-Care Activities/Hobbies: not discussed      History: no    Financial Resources:Medicare      Advanced Care Planning & Legal Concerns:   Advanced Directives/Living Will: no  LaPOST/POLST: no   Planning:  no    Power of : yes  Surrogate Decision Maker: Sisters/Jeanine and Ericka Beckham       Spirituality, Culture & Coping Mechanisms:  F- [place] and Belief: Moravian     I - Importance: Moderate     C - Community/Culture Values:     A - Address in Care:  Patient met with  while admitted       Goals/Hopes/Expectations: Live independently   Fears/Anxiety/Concerns: Patient expresses frustration with his inability to care for self and lack of housing options outside of nursing facility.         Complicated Bereavement Risk Assessment " Tool (CBRAT)  Reference:  [facility] Clinical Practice Group (May 2016). Bereavement Risk Screening and Management Guidelines.  Retrieved from: http://www.grpcc.com.au/wp-content/uploads//GUBAN-Vbzoyhugseh-Lbousnncd-and-Management-Guideline-2016.pdf      Bereaved Client Characteristics   Under 18      no  Was a Twin   no  Young Spouse   no  Elderly Spouse    no  Isolated    no  Lacks Meaningful Social Support   no  Dissatisfied with help available during illness   no  New to Financial [place] no  New to Decision-Making   no    Illness  Inherited Disorder   no  Stigmatized Disease in the family/community   no  Lengthy/Burdensome   no     Bereaved Client's History of Loss   Cumulative Multiple Losses   no  Previous Mental Health Illnesses   no  Current Mental Health Illness   no  Other Significant Health Issues   no   Migrant/Refugee   no Death  Sudden or Unexpected   no  Traumatic Circumstances Associated with Death   no  Significant Cultural/Social Burdens as a result of Death   no   Relationship with   Profound Lifelong Partner   no  Highly Dependent    no  Antagonistic   no  Ambivalent   no  Deeply Connected   yes  Culturally Defined   yes   Risk Factors Scores  0-2  Low  3-5  Moderate  5+  High  All persons scoring moderate to high presume to be at risk**    (** It is acknowledged that protective factors and resilience may outweigh apparent risk factors.      Total Risk Factors Score:   Low to moderate         Plan of Care:   SW met with patient at bedside for psychosocial assessment. Patient presents AAOx4 with depressed affect.  Patient expresses frustration regarding his inability to live independently due to his rheumatoid arthritis.  Patient reports he was homeless for about a year following the death of his mother, whom he lived with. Patient notes he was working with a shelter to obtain housing however became incapable to care for himself  "upon exacerbation of his RA. Patient notes his two sisters who reside in [place] do not have the space to take patient into their homes.  Patient has two adult children. Although patient last spoke with his daughter two weeks ago, patient reports living with his children is not an option as they "don't want to be bothered". Patient presents hopeless as he has no other safe options for housing alternative to the nursing facility. Supportive listening and validation of  concerns provided by this writer.    SW offered to complete HCPOA.  Patient agreed assigning his sisters/Jeanine and Ericka as decision makers. Patient unable to sign due to RA.  Patient's verbal consent witness by this writer and nurse. Document scanned into Epic.     Patient denies further psychosocial concerns. SW remains available to provide emotional support and psychosocial support. SW will continue to follow.    Dione Mendoza LCSW    Palliative Medicine                   "

## 2023-10-18 NOTE — SUBJECTIVE & OBJECTIVE
Interval History: Chart reviewed including 24h medication use. Patient continues to complain of bilateral wrist pain which he notes has worsened over the past month. Ongoing goals of care noted below      Medications:  Continuous Infusions:   sodium chloride 0.9% 200 mL/hr at 10/18/23 1258     Scheduled Meds:   enoxparin  40 mg Subcutaneous Daily    folic acid  1 mg Oral Daily    hydroxychloroquine  300 mg Oral Daily    predniSONE  10 mg Oral Daily    rifAMpin  600 mg Oral Daily    sulfaSALAzine  1,000 mg Oral BID    tamsulosin  1 capsule Oral Nightly     PRN Meds:0.9%  NaCl infusion (for blood administration), acetaminophen, dextrose 10%, dextrose 10%, diclofenac sodium, glucagon (human recombinant), glucose, glucose, melatonin, naloxone, oxyCODONE, sodium chloride 0.9%    Objective:     Vital Signs (Most Recent):  Temp: 98.2 °F (36.8 °C) (10/18/23 1100)  Pulse: 108 (10/18/23 1115)  Resp: 20 (10/18/23 1100)  BP: 100/64 (10/18/23 1100)  SpO2: 100 % (10/18/23 1115) Vital Signs (24h Range):  Temp:  [97.6 °F (36.4 °C)-98.9 °F (37.2 °C)] 98.2 °F (36.8 °C)  Pulse:  [100-118] 108  Resp:  [14-20] 20  SpO2:  [96 %-100 %] 100 %  BP: (100-114)/(55-72) 100/64     Weight: 81.6 kg (180 lb)  Body mass index is 25.83 kg/m².       Physical Exam  Vitals and nursing note reviewed.   Constitutional:       Appearance: He is ill-appearing.      Comments: Chronically ill-appearing older male lying in bed, in no acute distress, awake and conversant   Skin:     General: Skin is warm and dry.   Neurological:      General: No focal deficit present.      Mental Status: He is alert and oriented to person, place, and time.   Psychiatric:         Mood and Affect: Mood normal.         Behavior: Behavior normal.      Comments: Somewhat withdrawn              Advance Care Planning   Advance Directives:   Goals of Care: Supportive dialogue at bedside. Explored patient's decline further as he shared that 10 months ago he was still independent and  "working as a  and now he has been stuck in the hospital and "old folks homes". He continues to express frustration that "none of the docs at [facility] or [facility]"" or here can figure out what's wrong. I thought y'all were the best". I noted that I shared his frustration but promised that we would continue to do our best to find meaningful solutions for his care, acknowledging that we may not find a clear reason for his suffering. For the time being, he continues to agree with the current level of care and notes that he would consider more invasive interventions including short term life support if he continues on this trajectory. I asked him what he hopes for and he notes "to get better and be Chirag again". When I asked what he worries about, he becomes tearful, noting "doc, you know the answer to that; I dont want to say it"       CBC:   Recent Labs   Lab 10/18/23  1109   WBC 5.55   HGB 6.6*   HCT 22.8*   MCV 93        BMP:  Recent Labs   Lab 10/18/23  1109   *      K 3.2*      CO2 24   BUN 5*   CREATININE 0.6   CALCIUM 9.8   MG 1.5*     LFT:  Lab Results   Component Value Date    AST 12 10/18/2023    ALKPHOS 68 10/18/2023    BILITOT 0.2 10/18/2023     Albumin:   Albumin   Date Value Ref Range Status   10/18/2023 1.5 (L) 3.5 - 5.2 g/dL Final     Protein:   Total Protein   Date Value Ref Range Status   10/18/2023 6.6 6.0 - 8.4 g/dL Final     Lactic acid:   Lab Results   Component Value Date    LACTATE 1.1 10/07/2023     "

## 2023-10-18 NOTE — PLAN OF CARE
Problem: Infection  Goal: Absence of Infection Signs and Symptoms  Outcome: Ongoing, Progressing     Problem: Adult Inpatient Plan of Care  Goal: Plan of Care Review  Outcome: Ongoing, Progressing  Goal: Patient-Specific Goal (Individualized)  Outcome: Ongoing, Progressing  Goal: Absence of Hospital-Acquired Illness or Injury  Outcome: Ongoing, Progressing  Goal: Optimal Comfort and Wellbeing  Outcome: Ongoing, Progressing  Goal: Readiness for Transition of Care  Outcome: Ongoing, Progressing     Problem: Coping Ineffective  Goal: Effective Coping  Outcome: Ongoing, Progressing     Problem: Impaired Wound Healing  Goal: Optimal Wound Healing  Outcome: Ongoing, Progressing     Problem: Skin Injury Risk Increased  Goal: Skin Health and Integrity  Outcome: Ongoing, Progressing

## 2023-10-18 NOTE — CONSULTS
[facility] ([place] [address])  Wound Care    Patient Name:  Chirag Beckham   MRN:  [id number]  Date: 10/18/2023  Diagnosis: Obstruction of Fernandez catheter    History:     Past Medical History:   Diagnosis Date    Anemia 9/22/2023    BPH with obstruction/lower urinary tract symptoms 9/17/2023    Dyspepsia 9/22/2023    Iron deficiency anemia 6/13/2023    Latent tuberculosis 10/6/2023    Formatting of this note might be different from the original. NOTE: AFB from 7/17/2023 grew M. fortuitum. AFB smear was negative and all other smears and cultures have been negative to date. This organism is common in water and soil and represents either a transient infection or contamination (most likely the latter given no other evidence of it in sputum) and is not clinically significant.    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 diagnosed July 2023 Mostly in hands, wrists; and (to a lesser degree) in feet and ankles    Tobacco user 9/22/2023    Urinary retention due to benign prostatic hyperplasia 9/22/2023       Social History     Socioeconomic History    Marital status: Single     Social Determinants of Health     Financial Resource Strain: Unknown (10/7/2023)    Overall Financial Resource Strain (CARDIA)     Difficulty of Paying Living Expenses: Patient refused   Food Insecurity: Unknown (10/7/2023)    Hunger Vital Sign     Worried About Running Out of Food in the Last Year: Patient refused     Ran Out of Food in the Last Year: Patient refused   Transportation Needs: Unknown (10/7/2023)    PRAPARE - Transportation     Lack of Transportation (Medical): Patient refused     Lack of Transportation (Non-Medical): Patient refused   Physical Activity: Unknown (10/7/2023)    Exercise Vital Sign     Days of Exercise per Week:  "Patient refused     Minutes of Exercise per Session: Patient refused   Stress: Unknown (10/7/2023)    [employer] [place] [employer] - Occupational Stress Questionnaire     Feeling of Stress : Patient refused   Social Connections: Unknown (10/7/2023)    Social Connection and Isolation Panel [NHANES]     Frequency of Communication with Friends and Family: Patient refused     Frequency of Social Gatherings with Friends and Family: Patient refused     Attends Episcopal Services: Patient refused     Active Member of Clubs or Organizations: Patient refused     Attends Club or Organization Meetings: Patient refused     Marital Status: Patient refused   Housing Stability: Unknown (10/7/2023)    Housing Stability Vital Sign     Unable to Pay for Housing in the Last Year: Patient refused     Unstable Housing in the Last Year: Patient refused       Precautions:     Allergies as of 10/16/2023    (No Known Allergies)       WOC Assessment Details/Treatment   Patient seen for wound care consultation.   Reviewed chart for this encounter.   See Flow Sheet for findings.    Rudolph: 18  Albumin: 1.5  Wound is POA per chart    Pt sitting up in bed, agred to asseessment, pt was able to turn with assistance, foam border removed revealing open area with pink/red granulation tissue, cleansed with coloplast wipes(at bedside) applied triad. Small open area on left buttock measures 0.5x0.3x0.1.    Immerse ordered    RECOMMENDATIONS:  Keep pt clean and dry.   NO foam borders    Ensure bed settings are correct:   "Immerse"  Each green bar = 50lbs, this pt weight 180lbs, therefore 3 green bars is appropriate for patient immersion.    Continue implementing Q2H turn protocol     Discussed POC with patient and primary nurse.   See EMR for orders & patient education.    Discussed nutrition and the role of protein in wound healing with the patient. Instructed patient to optimize protein for wound healing.    Nursing to continue care & " monitoring.  Nursing to maintain pressure injury prevention interventions.     10/18/23 1100   WOCN Assessment   WOCN Total Time (mins) 30   Visit Date 10/18/23   Visit Time 1100   Consult Type New   WOCN Speciality Wound   Wound moisture;At risk for pressure Injury   Intervention assessed;changed;applied;chart review;coordination of care;consult other service;orders   Teaching on-going        Altered Skin Integrity 10/06/23 2229 Right medial Buttocks #1 Ulceration   Date First Assessed/Time First Assessed: 10/06/23 2229   Side: Right  Orientation: medial  Location: Buttocks  Wound Number: #1  Is this injury device related?: No  Primary Wound Type: Ulceration   Wound Image    Dressing Appearance Dry;Clean;Intact   Drainage Amount Scant   Drainage Characteristics/Odor Serosanguineous;No odor   Appearance Pink   Tissue loss description Partial thickness   Red (%), Wound Tissue Color 100 %   Periwound Area Intact;Moist   Wound Edges Irregular   Wound Length (cm) 1.8 cm   Wound Width (cm) 2 cm   Wound Depth (cm) 0.1 cm   Wound Volume (cm^3) 0.36 cm^3   Wound Surface Area (cm^2) 3.6 cm^2   Care Cleansed with:;Other (see comments)  (Coloplast wipes(at bedside))   Dressing Applied;Other (comment)  (Triad)   Off Loading Other (see comments)  (Immerse ordered)   Dressing Change Due 10/18/23

## 2023-10-18 NOTE — NURSING
Pt arrived to room via stretcher with no personal belongings and no acute distress. Pt AAO x 4, VS stable, HR elevated, disla intact. No other concerns at the time. Pt oriented to room and call light use. Bed locked and low, call light within reach.

## 2023-10-19 LAB
ALBUMIN SERPL BCP-MCNC: 1.4 G/DL (ref 3.5–5.2)
ALP SERPL-CCNC: 70 U/L (ref 55–135)
ALT SERPL W/O P-5'-P-CCNC: 6 U/L (ref 10–44)
ANION GAP SERPL CALC-SCNC: 6 MMOL/L (ref 8–16)
AST SERPL-CCNC: 9 U/L (ref 10–40)
BASOPHILS # BLD AUTO: 0.01 K/UL (ref 0–0.2)
BASOPHILS # BLD AUTO: 0.01 K/UL (ref 0–0.2)
BASOPHILS NFR BLD: 0.1 % (ref 0–1.9)
BASOPHILS NFR BLD: 0.2 % (ref 0–1.9)
BILIRUB SERPL-MCNC: 0.3 MG/DL (ref 0.1–1)
BUN SERPL-MCNC: 4 MG/DL (ref 8–23)
CALCIUM SERPL-MCNC: 9.6 MG/DL (ref 8.7–10.5)
CHLORIDE SERPL-SCNC: 110 MMOL/L (ref 95–110)
CO2 SERPL-SCNC: 22 MMOL/L (ref 23–29)
CREAT SERPL-MCNC: 0.5 MG/DL (ref 0.5–1.4)
DIFFERENTIAL METHOD: ABNORMAL
DIFFERENTIAL METHOD: ABNORMAL
EOSINOPHIL # BLD AUTO: 0.1 K/UL (ref 0–0.5)
EOSINOPHIL # BLD AUTO: 0.1 K/UL (ref 0–0.5)
EOSINOPHIL NFR BLD: 1.7 % (ref 0–8)
EOSINOPHIL NFR BLD: 2.1 % (ref 0–8)
ERYTHROCYTE [DISTWIDTH] IN BLOOD BY AUTOMATED COUNT: 15.8 % (ref 11.5–14.5)
ERYTHROCYTE [DISTWIDTH] IN BLOOD BY AUTOMATED COUNT: 15.9 % (ref 11.5–14.5)
EST. GFR  (NO RACE VARIABLE): >60 ML/MIN/1.73 M^2
GLUCOSE SERPL-MCNC: 92 MG/DL (ref 70–110)
HCT VFR BLD AUTO: 24.7 % (ref 40–54)
HCT VFR BLD AUTO: 25.3 % (ref 40–54)
HGB BLD-MCNC: 7.4 G/DL (ref 14–18)
HGB BLD-MCNC: 7.7 G/DL (ref 14–18)
IMM GRANULOCYTES # BLD AUTO: 0.01 K/UL (ref 0–0.04)
IMM GRANULOCYTES # BLD AUTO: 0.02 K/UL (ref 0–0.04)
IMM GRANULOCYTES NFR BLD AUTO: 0.2 % (ref 0–0.5)
IMM GRANULOCYTES NFR BLD AUTO: 0.3 % (ref 0–0.5)
KAPPA LC SER QL IA: 13.71 MG/DL (ref 0.33–1.94)
KAPPA LC/LAMBDA SER IA: 1.51 (ref 0.26–1.65)
LAMBDA LC SER QL IA: 9.06 MG/DL (ref 0.57–2.63)
LYMPHOCYTES # BLD AUTO: 1.6 K/UL (ref 1–4.8)
LYMPHOCYTES # BLD AUTO: 1.6 K/UL (ref 1–4.8)
LYMPHOCYTES NFR BLD: 23.2 % (ref 18–48)
LYMPHOCYTES NFR BLD: 25.7 % (ref 18–48)
MAGNESIUM SERPL-MCNC: 1.6 MG/DL (ref 1.6–2.6)
MCH RBC QN AUTO: 27.7 PG (ref 27–31)
MCH RBC QN AUTO: 28 PG (ref 27–31)
MCHC RBC AUTO-ENTMCNC: 30 G/DL (ref 32–36)
MCHC RBC AUTO-ENTMCNC: 30.4 G/DL (ref 32–36)
MCV RBC AUTO: 92 FL (ref 82–98)
MCV RBC AUTO: 93 FL (ref 82–98)
MONOCYTES # BLD AUTO: 0.5 K/UL (ref 0.3–1)
MONOCYTES # BLD AUTO: 0.6 K/UL (ref 0.3–1)
MONOCYTES NFR BLD: 8.3 % (ref 4–15)
MONOCYTES NFR BLD: 9.2 % (ref 4–15)
NEUTROPHILS # BLD AUTO: 3.9 K/UL (ref 1.8–7.7)
NEUTROPHILS # BLD AUTO: 4.4 K/UL (ref 1.8–7.7)
NEUTROPHILS NFR BLD: 63.9 % (ref 38–73)
NEUTROPHILS NFR BLD: 65.1 % (ref 38–73)
NRBC BLD-RTO: 0 /100 WBC
NRBC BLD-RTO: 0 /100 WBC
PATH REV BLD -IMP: NORMAL
PATH REV BLD -IMP: NORMAL
PHOSPHATE SERPL-MCNC: 2.3 MG/DL (ref 2.7–4.5)
PLATELET # BLD AUTO: 225 K/UL (ref 150–450)
PLATELET # BLD AUTO: 236 K/UL (ref 150–450)
PMV BLD AUTO: 10.5 FL (ref 9.2–12.9)
PMV BLD AUTO: 9.5 FL (ref 9.2–12.9)
POTASSIUM SERPL-SCNC: 3.7 MMOL/L (ref 3.5–5.1)
PROT SERPL-MCNC: 6.3 G/DL (ref 6–8.4)
PTH RELATED PROT SERPL-SCNC: 0.9 PMOL/L
RBC # BLD AUTO: 2.67 M/UL (ref 4.6–6.2)
RBC # BLD AUTO: 2.75 M/UL (ref 4.6–6.2)
SODIUM SERPL-SCNC: 138 MMOL/L (ref 136–145)
WBC # BLD AUTO: 6.02 K/UL (ref 3.9–12.7)
WBC # BLD AUTO: 6.82 K/UL (ref 3.9–12.7)

## 2023-10-19 PROCEDURE — 80053 COMPREHEN METABOLIC PANEL: CPT

## 2023-10-19 PROCEDURE — 84165 PROTEIN E-PHORESIS SERUM: CPT | Mod: 26,,, | Performed by: PATHOLOGY

## 2023-10-19 PROCEDURE — 84165 PROTEIN E-PHORESIS SERUM: CPT

## 2023-10-19 PROCEDURE — 25000003 PHARM REV CODE 250

## 2023-10-19 PROCEDURE — 85060 BLOOD SMEAR INTERPRETATION: CPT | Mod: ,,, | Performed by: PATHOLOGY

## 2023-10-19 PROCEDURE — 83735 ASSAY OF MAGNESIUM: CPT

## 2023-10-19 PROCEDURE — 86334 PATHOLOGIST INTERPRETATION IFE: ICD-10-PCS | Mod: 26,,, | Performed by: PATHOLOGY

## 2023-10-19 PROCEDURE — 84100 ASSAY OF PHOSPHORUS: CPT

## 2023-10-19 PROCEDURE — 63600175 PHARM REV CODE 636 W HCPCS

## 2023-10-19 PROCEDURE — 86334 IMMUNOFIX E-PHORESIS SERUM: CPT | Mod: 26,,, | Performed by: PATHOLOGY

## 2023-10-19 PROCEDURE — 82652 VIT D 1 25-DIHYDROXY: CPT

## 2023-10-19 PROCEDURE — 84165 PATHOLOGIST INTERPRETATION SPE: ICD-10-PCS | Mod: 26,,, | Performed by: PATHOLOGY

## 2023-10-19 PROCEDURE — 99233 PR SUBSEQUENT HOSPITAL CARE,LEVL III: ICD-10-PCS | Mod: ,,, | Performed by: HOSPITALIST

## 2023-10-19 PROCEDURE — 85060 PATHOLOGIST REVIEW: ICD-10-PCS | Mod: ,,, | Performed by: PATHOLOGY

## 2023-10-19 PROCEDURE — 86334 IMMUNOFIX E-PHORESIS SERUM: CPT

## 2023-10-19 PROCEDURE — 99233 SBSQ HOSP IP/OBS HIGH 50: CPT | Mod: ,,, | Performed by: HOSPITALIST

## 2023-10-19 PROCEDURE — 85025 COMPLETE CBC W/AUTO DIFF WBC: CPT | Mod: 91

## 2023-10-19 PROCEDURE — 21400001 HC TELEMETRY ROOM

## 2023-10-19 PROCEDURE — 36415 COLL VENOUS BLD VENIPUNCTURE: CPT

## 2023-10-19 RX ORDER — SODIUM,POTASSIUM PHOSPHATES 280-250MG
2 POWDER IN PACKET (EA) ORAL ONCE
Status: COMPLETED | OUTPATIENT
Start: 2023-10-19 | End: 2023-10-19

## 2023-10-19 RX ADMIN — SODIUM CHLORIDE: 9 INJECTION, SOLUTION INTRAVENOUS at 02:10

## 2023-10-19 RX ADMIN — POTASSIUM & SODIUM PHOSPHATES POWDER PACK 280-160-250 MG 2 PACKET: 280-160-250 PACK at 10:10

## 2023-10-19 RX ADMIN — FOLIC ACID 1 MG: 1 TABLET ORAL at 10:10

## 2023-10-19 RX ADMIN — SULFASALAZINE 1000 MG: 500 TABLET ORAL at 10:10

## 2023-10-19 RX ADMIN — ENOXAPARIN SODIUM 40 MG: 40 INJECTION SUBCUTANEOUS at 05:10

## 2023-10-19 RX ADMIN — SULFASALAZINE 1000 MG: 500 TABLET ORAL at 09:10

## 2023-10-19 RX ADMIN — PREDNISONE 10 MG: 5 TABLET ORAL at 10:10

## 2023-10-19 RX ADMIN — SODIUM CHLORIDE: 9 INJECTION, SOLUTION INTRAVENOUS at 07:10

## 2023-10-19 RX ADMIN — HYDROXYCHLOROQUINE SULFATE 300 MG: 200 TABLET, FILM COATED ORAL at 11:10

## 2023-10-19 RX ADMIN — TAMSULOSIN HYDROCHLORIDE 0.4 MG: 0.4 CAPSULE ORAL at 09:10

## 2023-10-19 RX ADMIN — SODIUM CHLORIDE: 9 INJECTION, SOLUTION INTRAVENOUS at 09:10

## 2023-10-19 RX ADMIN — RIFAMPIN 600 MG: 300 CAPSULE ORAL at 10:10

## 2023-10-19 NOTE — NURSING
"No acute changes overnight. Pt AAO x 4, VS stable. Fernandez intact. Pt turned to right side, pillows in use. Pt refused specialty bed stating " I don't want that, I'm going home tomorrow". Medications given as ordered. Side rails up x 4, bed locked and low. Call light within reach, however, door slightly cracked open d/t pt inability to use call light.   "

## 2023-10-19 NOTE — ASSESSMENT & PLAN NOTE
Severe RA of the hands and wrist joints, with some involvement in bilateral ankles as well. Follows with Rheum at [facility] and receives regular, weekly injections of DMARDs.    Reporting good pain control while inpatient.    - Continue current RA regimen of HCQ, sulfasalazine, prednisone while admitted here  - PRN pain regimen

## 2023-10-19 NOTE — ASSESSMENT & PLAN NOTE
Supposed to follow up with [facility] for disla removal but was lost to f/u.  Urology consulted while inpatient; rec'ed following outpt with [facility]  Continue flomax.    - Patient refused voiding trial 10/19  - Plan for voiding trial 10/20

## 2023-10-19 NOTE — ASSESSMENT & PLAN NOTE
Placed in September 2023 due to retention 2/2 BPH.  Planned for removal with [facility] but lost to f/u.  Fernandez exchanged last admission.    - Voiding trial while inpatient, pt refused 10/19

## 2023-10-19 NOTE — SUBJECTIVE & OBJECTIVE
Interval History: NAEON. Received 1u pRBC yesterday. Multiple myeloma workup initiated and Heme/Onc inquired regarding inpatient consult vs outpatient follow up. Pt reporting poor social support and agreeable to SW and palliative involvement. Attempted voiding trial today however patient refused.    Review of Systems   Constitutional:  Negative for chills and fever.   HENT:  Negative for congestion and sore throat.    Eyes:  Negative for photophobia and visual disturbance.   Respiratory:  Negative for cough and shortness of breath.    Cardiovascular:  Negative for chest pain and palpitations.   Gastrointestinal:  Negative for abdominal pain and constipation.   Genitourinary:  Negative for decreased urine volume, difficulty urinating, dysuria, flank pain, hematuria and urgency.   Musculoskeletal:  Positive for arthralgias and joint swelling. Negative for back pain.   Skin:  Positive for wound. Negative for rash.   Neurological:  Negative for dizziness and syncope.   Psychiatric/Behavioral:  Negative for agitation and behavioral problems.      Objective:     Vital Signs (Most Recent):  Temp: 98.6 °F (37 °C) (10/19/23 0822)  Pulse: 105 (10/19/23 0822)  Resp: 18 (10/19/23 0822)  BP: 119/80 (10/19/23 0822)  SpO2: 95 % (10/19/23 0822) Vital Signs (24h Range):  Temp:  [97.1 °F (36.2 °C)-99.5 °F (37.5 °C)] 98.6 °F (37 °C)  Pulse:  [103-112] 105  Resp:  [18-20] 18  SpO2:  [95 %-100 %] 95 %  BP: (100-119)/(63-80) 119/80     Weight: 81.6 kg (180 lb)  Body mass index is 25.83 kg/m².    Intake/Output Summary (Last 24 hours) at 10/19/2023 0849  Last data filed at 10/19/2023 0455  Gross per 24 hour   Intake 586 ml   Output 2200 ml   Net -1614 ml         Physical Exam  Vitals reviewed.   Constitutional:       General: He is not in acute distress.     Appearance: Normal appearance. He is normal weight. He is ill-appearing.   HENT:      Head: Normocephalic and atraumatic.      Nose: No congestion or rhinorrhea.      Mouth/Throat:       Mouth: Mucous membranes are dry.      Pharynx: Oropharynx is clear.      Comments: Poor dentition  Cardiovascular:      Rate and Rhythm: Normal rate and regular rhythm.      Pulses: Normal pulses.      Heart sounds: Normal heart sounds.   Pulmonary:      Effort: Pulmonary effort is normal. No respiratory distress.      Breath sounds: Normal breath sounds.   Abdominal:      General: Bowel sounds are normal.      Palpations: Abdomen is soft.      Tenderness: There is no abdominal tenderness.   Genitourinary:     Comments: Fernandez draining appropriately.  Musculoskeletal:         General: Swelling, tenderness and deformity present.      Cervical back: Full passive range of motion without pain and normal range of motion.      Right lower leg: No edema.      Left lower leg: No edema.      Comments: Joint deformities noted bilateral wrists, with swelling and warmth on palpation   Skin:     General: Skin is warm and dry.   Neurological:      General: No focal deficit present.      Mental Status: He is alert and oriented to person, place, and time.   Psychiatric:         Mood and Affect: Mood normal.         Behavior: Behavior normal.             Significant Labs: All pertinent labs within the past 24 hours have been reviewed.    Significant Imaging: I have reviewed all pertinent imaging results/findings within the past 24 hours.

## 2023-10-19 NOTE — CHAPLAIN
Palliative Care     Patient: Chirag Beckham  MRN: [id number]  : 1960  Age: 63 y.o.  Hospital Length of Stay: 2  Code Status: Code Status Discussion Note  Attending Provider: Osiel Castano MD  Principal Problem: Obstruction of Fernandez catheter    Attempted to visit pt again and this time he was soundly sleeping; left my card and palliative  will try again tomorrow.     **Spiritual Care Dept. Chaplains are available evenings, overnight and weekends **    In Peace,  Rev. Belinda Cardoso MDiv, [facility]  Board Certified   Palliative Medicine Department  [phone]

## 2023-10-19 NOTE — ASSESSMENT & PLAN NOTE
Urinary retention due to benign prostatic hyperplasia  Chronic indwelling Disla catheter    63M hx of urinary retention 2/2 BPH p/w abdominal pain and distension from disla obstruction. Reported waking on 10/16 with suprapubic fullness and pain with no urine output from disla catheter. He says they would not exchange the disla at NH. Pt denies seeing any blood clots or hematuria. In the ED, pt tachycardic and had his Disla exchanged with immediate improvement in his symptoms. Nurse reports seeing what appeared to be sediment/debris, prior to exchanging Disla. UA with >100wbcs and many bacteria. On exam, no suprapubic distension or tenderness noted. Disla obstruction was likely due to precipitated mineral salts, worsened in the setting of hypercalcemia and possible UTI with urea-splitting bacteria. Pt feeling much better s/p disla exchange.    Plan:  - Disla draining clear yellow urine appropriately  - Encouraged adequate hydration to minimize risk, but difficult situation to manage. Will likely require proactive efforts from patient and nursing staff to recognize blocked disla catheter and when an exchange is needed.  - Continue home Flomax  - Continue NS 200cc/hr while inpt  - Voiding trial while inpatient

## 2023-10-19 NOTE — ASSESSMENT & PLAN NOTE
"S/p 2u pRBC  Daily CBC, CMP  Transfuse if Hgb < 7.0    C/f multiple myeloma contributing to pt's anemia, see "hypercalcemia"  "

## 2023-10-19 NOTE — PROGRESS NOTES
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 Pt presented from [facility] due to disla obstruction. Patient reports waking up morning of 10/16 and having suprapubic fullness and pain with no urine output from disla catheter. He says that nursing staff would not exchange the disla at NH. Pt denies seeing any blood clots or hematuria. He received the disla on 10/10 after recent hospitalization on 10/6, which was also due to needing his disla catheter replaced after presenting with lower abdominal tenderness and distension. In the ED, pt tachycardic and had his Disla exchanged with immediate improvement in his symptoms. Nurse reports seeing what appeared to be sediment/debris, prior to exchanging Disla. Patient denies any other symptoms on ROS asides from arthralgias and joint swelling.    In the ED, pt hemodynamically stable and normotensive. Tachycardic with HR up to 135, stable on room air. Pt had disla exchanged in the ED with immediate improvement in symptoms after urine was drained. Urine studies and culture obtained. Sepsis workup was initiated with fluid bolus of lactated Ringer's. UA positive for many bacteria and leukocytes, patient given 1 g ceftriaxone. Other labs notable for Hgb 6.8, slightly decreased from baseline. Patient was transfused 1U pRBCs. CMP notable for K 3.2, albumin 1.8, and elevated calcium at 11.9 which is similar to his  baseline. Patient admitted to Hospital medicine for further management.      Overview/Hospital Course:  Disla placed for urinary retention 2/2 BPH in September. Pt was instructed to follow up with [facility] but was lost to f/u. Hypercalcemia possibly contributing to debris and disla obstruction. OSH workup showed SPEP WNL and peripheral smear unremarkable; B2 microglobulin, IgG, and IgA elevated; Light chains,  UPEP, DAVID, repeat smear, and repeat SPEP pending.       Interval History: NAEON. Received 1u pRBC yesterday. Multiple myeloma workup initiated and Heme/Onc inquired regarding inpatient consult vs outpatient follow up. Pt reporting poor social support and agreeable to SW and palliative involvement. Attempted voiding trial today however patient refused.    Review of Systems   Constitutional:  Negative for chills and fever.   HENT:  Negative for congestion and sore throat.    Eyes:  Negative for photophobia and visual disturbance.   Respiratory:  Negative for cough and shortness of breath.    Cardiovascular:  Negative for chest pain and palpitations.   Gastrointestinal:  Negative for abdominal pain and constipation.   Genitourinary:  Negative for decreased urine volume, difficulty urinating, dysuria, flank pain, hematuria and urgency.   Musculoskeletal:  Positive for arthralgias and joint swelling. Negative for back pain.   Skin:  Positive for wound. Negative for rash.   Neurological:  Negative for dizziness and syncope.   Psychiatric/Behavioral:  Negative for agitation and behavioral problems.      Objective:     Vital Signs (Most Recent):  Temp: 98.6 °F (37 °C) (10/19/23 0822)  Pulse: 105 (10/19/23 0822)  Resp: 18 (10/19/23 0822)  BP: 119/80 (10/19/23 0822)  SpO2: 95 % (10/19/23 0822) Vital Signs (24h Range):  Temp:  [97.1 °F (36.2 °C)-99.5 °F (37.5 °C)] 98.6 °F (37 °C)  Pulse:  [103-112] 105  Resp:  [18-20] 18  SpO2:  [95 %-100 %] 95 %  BP: (100-119)/(63-80) 119/80     Weight: 81.6 kg (180 lb)  Body mass  index is 25.83 kg/m².    Intake/Output Summary (Last 24 hours) at 10/19/2023 0849  Last data filed at 10/19/2023 0455  Gross per 24 hour   Intake 586 ml   Output 2200 ml   Net -1614 ml         Physical Exam  Vitals reviewed.   Constitutional:       General: He is not in acute distress.     Appearance: Normal appearance. He is normal weight. He is ill-appearing.   HENT:      Head: Normocephalic and atraumatic.      Nose: No congestion or rhinorrhea.      Mouth/Throat:      Mouth: Mucous membranes are dry.      Pharynx: Oropharynx is clear.      Comments: Poor dentition  Cardiovascular:      Rate and Rhythm: Normal rate and regular rhythm.      Pulses: Normal pulses.      Heart sounds: Normal heart sounds.   Pulmonary:      Effort: Pulmonary effort is normal. No respiratory distress.      Breath sounds: Normal breath sounds.   Abdominal:      General: Bowel sounds are normal.      Palpations: Abdomen is soft.      Tenderness: There is no abdominal tenderness.   Genitourinary:     Comments: Fernandez draining appropriately.  Musculoskeletal:         General: Swelling, tenderness and deformity present.      Cervical back: Full passive range of motion without pain and normal range of motion.      Right lower leg: No edema.      Left lower leg: No edema.      Comments: Joint deformities noted bilateral wrists, with swelling and warmth on palpation   Skin:     General: Skin is warm and dry.   Neurological:      General: No focal deficit present.      Mental Status: He is alert and oriented to person, place, and time.   Psychiatric:         Mood and Affect: Mood normal.         Behavior: Behavior normal.             Significant Labs: All pertinent labs within the past 24 hours have been reviewed.    Significant Imaging: I have reviewed all pertinent imaging results/findings within the past 24 hours.      Assessment/Plan:      * Obstruction of Fernandez catheter  Urinary retention due to benign prostatic hyperplasia  Chronic  indwelling Disla catheter    63M hx of urinary retention 2/2 BPH p/w abdominal pain and distension from disla obstruction. Reported waking on 10/16 with suprapubic fullness and pain with no urine output from disla catheter. He says they would not exchange the disla at NH. Pt denies seeing any blood clots or hematuria. In the ED, pt tachycardic and had his Disla exchanged with immediate improvement in his symptoms. Nurse reports seeing what appeared to be sediment/debris, prior to exchanging Disla. UA with >100wbcs and many bacteria. On exam, no suprapubic distension or tenderness noted. Disla obstruction was likely due to precipitated mineral salts, worsened in the setting of hypercalcemia and possible UTI with urea-splitting bacteria. Pt feeling much better s/p disla exchange.    Plan:  - Disla draining clear yellow urine appropriately  - Encouraged adequate hydration to minimize risk, but difficult situation to manage. Will likely require proactive efforts from patient and nursing staff to recognize blocked disla catheter and when an exchange is needed.  - Continue home Flomax  - Continue NS 200cc/hr while inpt  - Voiding trial while inpatient    Palliative care encounter  Appreciate palliative involvement    Bacteriuria  UA with >100 WBCs and >100 RBCs.  Patient asymptomatic after disla exchanged.  Will hold Abx due to asymptomatic presentation.    Chronic indwelling Disla catheter  Placed in September 2023 due to retention 2/2 BPH.  Planned for removal with [facility] but lost to f/u.  Disla exchanged last admission.    - Voiding trial while inpatient, pt refused 10/19      Hypercalcemia  The patient has hypercalcemia that is currently uncontrolled. The patient has the following symptoms due to their hypercalcemia: asymptomatic at present, however may be contributing to disla obstruction. The hypercalcemia is likely due to poor oral intake and dehydration, and bedbound status. We will obtain the following labs to work  up the hypercalcemia:     PTH, Intact   Date Value Ref Range Status   10/07/2023 12.9 9.0 - 77.0 pg/mL Final    . We will treat the hypercalcemia with: IV fluids ordered at a rate of 200cc ml/hr. Their latest calcium has been reviewed and is listed below.  Ionized Calcium   Date Value Ref Range Status   10/07/2023 1.65 (H) 1.06 - 1.42 mmol/L Final       - Continue fluids with NS 200cc/hr while corrected Ca2+ < 12  - Encourage oral rehydration  - Consider furosemide if severe (corrected Ca2+ > 12)  - Follow up PTHr  - Previous SPEP and smear WNL at [facility]  - IgG, IgA and B2 microglobulin elevated; light chains, UPEP, DAVID, repeat SPEP, repeat smear pending; concerning for multiple myleoma  - Heme/Onc aware of patient and will see when labs result    Hypokalemia  Noted to be hypokalemic on past admissions and current admission, replete as needed    Urinary retention due to benign prostatic hyperplasia  Supposed to follow up with [facility] for disla removal but was lost to f/u.  Urology consulted while inpatient; rec'ed following outpt with [facility].  Continue flomax.    - Patient refused voiding trial 10/19  - Plan for voiding trial 10/20    Rheumatoid arthritis involving multiple sites with positive rheumatoid factor  Severe RA of the hands and wrist joints, with some involvement in bilateral ankles as well. Follows with Rheum at [facility] and receives regular, weekly injections of DMARDs.    Reporting good pain control while inpatient.    - Continue current RA regimen of HCQ, sulfasalazine, prednisone while admitted here  - PRN pain regimen    Latent tuberculosis  On past admission, pt had AFB smear which resulted as Mycobacterium Fortuitum. Repeat AFB was negative. Growth on smear appeared nontuberculous, so low concern for harboring resistance; pt started rifampin per ID recs on July 20th, duration 16 wks, so EOT 11/20/2023 for Latent TB. No respiratory symptoms at present.    - Continue Rifampin 600mg qd with EOT on  "11/20/23, per OSH documentation.    Iron deficiency anemia  S/p 2u pRBC  Daily CBC, CMP  Transfuse if Hgb < 7.0    C/f multiple myeloma contributing to pt's anemia, see "hypercalcemia"      VTE Risk Mitigation (From admission, onward)           Ordered     enoxaparin injection 40 mg  Daily         10/17/23 0452     IP VTE HIGH RISK PATIENT  Once         10/17/23 0452     Place sequential compression device  Until discontinued         10/17/23 0452                    Discharge Planning   YUDELKA: 10/20/2023     Code Status: Full Code   Is the patient medically ready for discharge?:     Reason for patient still in hospital (select all that apply): Laboratory test  Discharge Plan A: Return to nursing home            Beatrice Guerin MD  Department of Hospital Medicine   [facility] - Intensive Care ([place] [id number])    "

## 2023-10-19 NOTE — ASSESSMENT & PLAN NOTE
The patient has hypercalcemia that is currently uncontrolled. The patient has the following symptoms due to their hypercalcemia: asymptomatic at present, however may be contributing to disla obstruction. The hypercalcemia is likely due to poor oral intake and dehydration, and bedbound status. We will obtain the following labs to work up the hypercalcemia:     PTH, Intact   Date Value Ref Range Status   10/07/2023 12.9 9.0 - 77.0 pg/mL Final    . We will treat the hypercalcemia with: IV fluids ordered at a rate of 200cc ml/hr. Their latest calcium has been reviewed and is listed below.  Ionized Calcium   Date Value Ref Range Status   10/07/2023 1.65 (H) 1.06 - 1.42 mmol/L Final       - Continue fluids with NS 200cc/hr while corrected Ca2+ < 12  - Encourage oral rehydration  - Consider furosemide if severe (corrected Ca2+ > 12)  - Follow up PTHr  - Previous SPEP and smear WNL at [facility]  - IgG, IgA and B2 microglobulin elevated; light chains, UPEP, DAVID, repeat SPEP, repeat smear pending; concerning for multiple myleoma  - Heme/Onc aware of patient and will see when labs result

## 2023-10-19 NOTE — CHAPLAIN
Palliative Care     Patient: Chirag Beckham  MRN: [id number]  : 1960  Age: 63 y.o.  Hospital Length of Stay: 2  Code Status: Code Status Discussion Note  Attending Provider: Osiel Castano MD  Principal Problem: Obstruction of Fernandez catheter    Non-clinical observations of patient/room: Attempted to visit [facility] pt per request of [facility] provider; pt was awake, alert, alone, except for the tech  who was feeding him his breakfast. Brief intro and will return later.    Pt was welcoming and open, but said he's eating right now and agreed for me to return later. I referenced Dr. Tobin, [facility] provider and Dione, [facility] SW.    Spiritual Goals of Care    Present during the visit: Patient and Other - tech present helping pt eat  What gives you strength as you think about your illness?: Not discussed  What are your most important life goals as it relates to your illness?: Not discussed  What's your biggest fear or concern as it relates to your illness?: Not discussed  How is your yo, spirituality, Moravian rituals helping you throughout your illness?: Not discussed  How much does your family know about your priorities and wishes?: Not discussed  What yo tradition/Congregational does pt identify?: [employer]ian per Epic and notes from pall med provider  Is the patient in spiritual distress?:-- TBD  Does the patient welcome prayer?:-- TBD  Will palliative  continue to follow?: Yes    Future (Spiritual Care Plan of Action):  Palliative med  will return and continue to follow. Lord, in your mercy.       **Spiritual Care Dept. Chaplains are available evenings, overnight and weekends **    In Peace,  Rev. Belinda Cardoso MDiv, [facility]  Board Certified   Palliative Medicine Department  [phone]

## 2023-10-19 NOTE — PROGRESS NOTES
Patient is refusing voiding trial at this time.Secure chat messaged Dr. Beatrice Guerin to confirm to wait until tomorrow per her progress note to see if patient is willing to do voiding trial today after all.Otherwise will ask patient to allow us to do voiding trail on tomorrow.

## 2023-10-20 VITALS
TEMPERATURE: 99 F | SYSTOLIC BLOOD PRESSURE: 117 MMHG | WEIGHT: 180 LBS | HEART RATE: 110 BPM | RESPIRATION RATE: 16 BRPM | DIASTOLIC BLOOD PRESSURE: 80 MMHG | BODY MASS INDEX: 25.83 KG/M2 | OXYGEN SATURATION: 98 %

## 2023-10-20 LAB
ALBUMIN SERPL BCP-MCNC: 1.5 G/DL (ref 3.5–5.2)
ALBUMIN SERPL ELPH-MCNC: 1.78 G/DL (ref 3.35–5.55)
ALP SERPL-CCNC: 80 U/L (ref 55–135)
ALPHA1 GLOB SERPL ELPH-MCNC: 0.56 G/DL (ref 0.17–0.41)
ALPHA2 GLOB SERPL ELPH-MCNC: 0.75 G/DL (ref 0.43–0.99)
ALT SERPL W/O P-5'-P-CCNC: 6 U/L (ref 10–44)
ANION GAP SERPL CALC-SCNC: 8 MMOL/L (ref 8–16)
AST SERPL-CCNC: 9 U/L (ref 10–40)
B-GLOBULIN SERPL ELPH-MCNC: 0.98 G/DL (ref 0.5–1.1)
BASOPHILS # BLD AUTO: 0.01 K/UL (ref 0–0.2)
BASOPHILS NFR BLD: 0.1 % (ref 0–1.9)
BILIRUB SERPL-MCNC: 0.2 MG/DL (ref 0.1–1)
BUN SERPL-MCNC: 4 MG/DL (ref 8–23)
CALCIUM SERPL-MCNC: 9.3 MG/DL (ref 8.7–10.5)
CHLORIDE SERPL-SCNC: 108 MMOL/L (ref 95–110)
CO2 SERPL-SCNC: 22 MMOL/L (ref 23–29)
CREAT SERPL-MCNC: 0.6 MG/DL (ref 0.5–1.4)
DIFFERENTIAL METHOD: ABNORMAL
EOSINOPHIL # BLD AUTO: 0.2 K/UL (ref 0–0.5)
EOSINOPHIL NFR BLD: 2.4 % (ref 0–8)
ERYTHROCYTE [DISTWIDTH] IN BLOOD BY AUTOMATED COUNT: 15.9 % (ref 11.5–14.5)
EST. GFR  (NO RACE VARIABLE): >60 ML/MIN/1.73 M^2
GAMMA GLOB SERPL ELPH-MCNC: 2.03 G/DL (ref 0.67–1.58)
GLUCOSE SERPL-MCNC: 91 MG/DL (ref 70–110)
HCT VFR BLD AUTO: 25.9 % (ref 40–54)
HGB BLD-MCNC: 7.5 G/DL (ref 14–18)
IMM GRANULOCYTES # BLD AUTO: 0.02 K/UL (ref 0–0.04)
IMM GRANULOCYTES NFR BLD AUTO: 0.3 % (ref 0–0.5)
INTERPRETATION SERPL IFE-IMP: NORMAL
LYMPHOCYTES # BLD AUTO: 1.7 K/UL (ref 1–4.8)
LYMPHOCYTES NFR BLD: 25.1 % (ref 18–48)
MAGNESIUM SERPL-MCNC: 1.5 MG/DL (ref 1.6–2.6)
MCH RBC QN AUTO: 26.7 PG (ref 27–31)
MCHC RBC AUTO-ENTMCNC: 29 G/DL (ref 32–36)
MCV RBC AUTO: 92 FL (ref 82–98)
MONOCYTES # BLD AUTO: 0.6 K/UL (ref 0.3–1)
MONOCYTES NFR BLD: 9.1 % (ref 4–15)
NEUTROPHILS # BLD AUTO: 4.3 K/UL (ref 1.8–7.7)
NEUTROPHILS NFR BLD: 63 % (ref 38–73)
NRBC BLD-RTO: 0 /100 WBC
PATHOLOGIST INTERPRETATION IFE: NORMAL
PATHOLOGIST INTERPRETATION SPE: NORMAL
PHOSPHATE SERPL-MCNC: 2.8 MG/DL (ref 2.7–4.5)
PLATELET # BLD AUTO: 209 K/UL (ref 150–450)
PMV BLD AUTO: 11.2 FL (ref 9.2–12.9)
POTASSIUM SERPL-SCNC: 3.1 MMOL/L (ref 3.5–5.1)
PROT SERPL-MCNC: 6.1 G/DL (ref 6–8.4)
PROT SERPL-MCNC: 6.6 G/DL (ref 6–8.4)
RBC # BLD AUTO: 2.81 M/UL (ref 4.6–6.2)
SODIUM SERPL-SCNC: 138 MMOL/L (ref 136–145)
WBC # BLD AUTO: 6.78 K/UL (ref 3.9–12.7)

## 2023-10-20 PROCEDURE — 80053 COMPREHEN METABOLIC PANEL: CPT

## 2023-10-20 PROCEDURE — 83735 ASSAY OF MAGNESIUM: CPT

## 2023-10-20 PROCEDURE — 99239 HOSP IP/OBS DSCHRG MGMT >30: CPT | Mod: ,,, | Performed by: HOSPITALIST

## 2023-10-20 PROCEDURE — 63600175 PHARM REV CODE 636 W HCPCS

## 2023-10-20 PROCEDURE — 99223 1ST HOSP IP/OBS HIGH 75: CPT | Mod: ,,, | Performed by: INTERNAL MEDICINE

## 2023-10-20 PROCEDURE — 99223 PR INITIAL HOSPITAL CARE,LEVL III: ICD-10-PCS | Mod: ,,, | Performed by: INTERNAL MEDICINE

## 2023-10-20 PROCEDURE — 99239 PR HOSPITAL DISCHARGE DAY,>30 MIN: ICD-10-PCS | Mod: ,,, | Performed by: HOSPITALIST

## 2023-10-20 PROCEDURE — 36415 COLL VENOUS BLD VENIPUNCTURE: CPT

## 2023-10-20 PROCEDURE — 25000003 PHARM REV CODE 250

## 2023-10-20 PROCEDURE — 85025 COMPLETE CBC W/AUTO DIFF WBC: CPT

## 2023-10-20 PROCEDURE — 84100 ASSAY OF PHOSPHORUS: CPT

## 2023-10-20 RX ORDER — MIRTAZAPINE 15 MG/1
15 TABLET, FILM COATED ORAL NIGHTLY
Qty: 30 TABLET | Refills: 0 | Status: SHIPPED | OUTPATIENT
Start: 2023-10-20 | End: 2023-11-19

## 2023-10-20 RX ORDER — MAGNESIUM SULFATE HEPTAHYDRATE 40 MG/ML
2 INJECTION, SOLUTION INTRAVENOUS ONCE
Status: COMPLETED | OUTPATIENT
Start: 2023-10-20 | End: 2023-10-20

## 2023-10-20 RX ADMIN — SODIUM CHLORIDE: 9 INJECTION, SOLUTION INTRAVENOUS at 05:10

## 2023-10-20 RX ADMIN — MAGNESIUM SULFATE HEPTAHYDRATE 2 G: 40 INJECTION, SOLUTION INTRAVENOUS at 08:10

## 2023-10-20 RX ADMIN — FOLIC ACID 1 MG: 1 TABLET ORAL at 08:10

## 2023-10-20 RX ADMIN — PREDNISONE 10 MG: 5 TABLET ORAL at 08:10

## 2023-10-20 RX ADMIN — HYDROXYCHLOROQUINE SULFATE 300 MG: 200 TABLET, FILM COATED ORAL at 08:10

## 2023-10-20 RX ADMIN — SULFASALAZINE 1000 MG: 500 TABLET ORAL at 08:10

## 2023-10-20 RX ADMIN — POTASSIUM BICARBONATE 40 MEQ: 391 TABLET, EFFERVESCENT ORAL at 08:10

## 2023-10-20 RX ADMIN — POTASSIUM BICARBONATE 40 MEQ: 391 TABLET, EFFERVESCENT ORAL at 01:10

## 2023-10-20 RX ADMIN — RIFAMPIN 600 MG: 300 CAPSULE ORAL at 08:10

## 2023-10-20 RX ADMIN — SODIUM CHLORIDE: 9 INJECTION, SOLUTION INTRAVENOUS at 01:10

## 2023-10-20 NOTE — PLAN OF CARE
NURSING HOME ORDERS    10/20/2023  [facility]  [facility] - INTENSIVE CARE ([facility])  [address]  [facility] [address]  Dept: [phone]  Loc: [phone]     Admit to Nursing Home      Diagnoses:  Active Hospital Problems    Diagnosis  POA    *Obstruction of Fernandez catheter [T83.091A]  Yes    Bacteriuria [R82.71]  Yes    Palliative care encounter [Z51.5]  Not Applicable    Chronic indwelling Fernandez catheter [Z97.8]  Not Applicable     Chronic    Hypokalemia [E87.6]  Yes    Latent tuberculosis [Z22.7]  Yes     Formatting of this note might be different from the original.  NOTE: AFB from 7/17/2023 grew M. fortuitum. AFB smear was negative and all other smears and cultures have been negative to date. This organism is common in water and soil and represents either a transient infection or contamination (most likely the latter given no other evidence of it in sputum) and is not clinically significant.      Urinary retention due to benign prostatic hyperplasia [N40.1, R33.8]  Yes     Chronic    Hypercalcemia [E83.52]  Yes     Chronic    Rheumatoid arthritis involving multiple sites with positive rheumatoid factor [M05.79]  Yes     Chronic     Formatting of this note might be different from the original.  diagnosed July 2023  Mostly in hands, wrists; and (to a lesser degree) in feet and ankles      Iron deficiency anemia [D50.9]  Yes     Chronic      Resolved Hospital Problems    Diagnosis Date Resolved POA    Urinary tract infection associated with indwelling urethral catheter [T83.511A, N39.0] 10/17/2023 Yes       Patient is homebound due to:  Obstruction of Fernandez catheter    Allergies:Review of patient's allergies indicates:  No Known Allergies    Vitals:  Routine    Diet: regular diet    Activities:   Up in a chair each morning as tolerated and Activity as tolerated    Goals of Care Treatment Preferences:  Code Status: Full Code      Labs:  Per facility    Nursing Precautions:   Fall and Pressure ulcer prevention    Consults:   Wound Care     Miscellaneous Care: Disla Care: Empty Disla bag every shift.  Change Disla every month. Flush disla daily.                 Medications: Discontinue all previous medication orders, if any. See new list below.     Medication List        START taking these medications      potassium bicarbonate disintegrating tablet  Commonly known as: K-LYTE  Take 2 tablets (40 mEq total) by mouth once daily.            CONTINUE taking these medications      acetaminophen 325 MG tablet  Commonly known as: TYLENOL  Take 975 mg by mouth every 6 (six) hours as needed for Pain.     diclofenac sodium 1 % Gel  Commonly known as: VOLTAREN  Apply 2 g topically 4 (four) times daily as needed (hand pain). (Do not apply to open wounds, eyes or mucus membrane)     folic acid 1 MG tablet  Commonly known as: FOLVITE  Take 1 mg by mouth once daily.     hydroxychloroquine 300 mg Tab  Take 300 mg by mouth once daily.     melatonin 5 mg  Commonly known as: MELATIN  Take 5 mg by mouth nightly.     methotrexate (PF) 12.5 mg/0.5 mL Syrg  Inject 25 mg into the skin every Sunday. Hold until cephalexin course finished.     mirtazapine 15 MG tablet  Commonly known as: REMERON  Take 1 tablet (15 mg total) by mouth every evening.     MULTIVITAMIN WITH IRON ORAL  Take 1 tablet by mouth once daily.     oxyCODONE 5 MG immediate release tablet  Commonly known as: ROXICODONE  Take 5 mg by mouth every 6 (six) hours as needed for Pain.     polyethylene glycol 17 gram Pwpk  Commonly known as: GLYCOLAX  Mix 17 gm with 6 to 8 ounces of water and take daily.     predniSONE 10 MG tablet  Commonly known as: DELTASONE  Take 10 mg by mouth every evening.     rifAMpin 300 MG capsule  Commonly known as: RIFADIN  Take 600 mg by mouth once daily.     sulfaSALAzine 500 mg Tab  Commonly known as: AZULFIDINE  Take 1,000 mg by mouth 2 (two) times daily.     tamsulosin 0.4 mg Cap  Commonly known as: FLOMAX  Take 1  capsule by mouth nightly.     thiamine 100 MG tablet  Take 1 tablet by mouth every evening.     tiZANidine 2 MG tablet  Commonly known as: ZANAFLEX  Take 2 mg by mouth every 6 (six) hours as needed (spasms).            STOP taking these medications      famotidine 20 MG tablet  Commonly known as: PEPCID                Immunizations Administered as of 10/20/2023       Name Date Dose VIS Date Route Exp Date    COVID-19, MRNA, LN-S, PF (Pfizer) (Purple Cap) 10/21/2021 -- -- Intramuscular --    Site: Left arm     : Pf[employer]     Lot: [id number]     COVID-19, MRNA, LN-S, PF (Pfizer) (Purple Cap) 5/31/2021 30 mcg -- -- --    Lot: [id number]     COVID-19, MRNA, LN-S, PF (Pfizer) (Purple Cap) 5/31/2021 30 mL -- Intramuscular --    Site: Right deltoid     : Pf[employer] Inc     Lot: [id number]             This patient has had both covid vaccinations    Some patients may experience side effects after vaccination.  These may include fever, headache, muscle or joint aches.  Most symptoms resolve with 24-48 hours and do not require urgent medical evaluation unless they persist for more than 72 hours or symptoms are concerning for an unrelated medical condition.          _________________________________  Beatrice Guerin MD  10/20/2023

## 2023-10-20 NOTE — ASSESSMENT & PLAN NOTE
The patient has hypercalcemia that is currently uncontrolled. The patient has the following symptoms due to their hypercalcemia: asymptomatic at present, however may be contributing to disla obstruction. The hypercalcemia is likely due to poor oral intake and dehydration, and bedbound status. We will obtain the following labs to work up the hypercalcemia:     PTH, Intact   Date Value Ref Range Status   10/07/2023 12.9 9.0 - 77.0 pg/mL Final    . We will treat the hypercalcemia with: IV fluids ordered at a rate of 200cc ml/hr. Their latest calcium has been reviewed and is listed below.  Ionized Calcium   Date Value Ref Range Status   10/07/2023 1.65 (H) 1.06 - 1.42 mmol/L Final       - Continue fluids with NS 200cc/hr while corrected Ca2+ < 12  - Encourage oral rehydration  - Consider furosemide if severe (corrected Ca2+ > 12)  - Follow up PTHr  - Previous SPEP and smear WNL at [facility] [place] [place]  - IgG, IgA and B2 microglobulin, light chains elevated; Heme/Onc consulted, appreciate recs

## 2023-10-20 NOTE — SUBJECTIVE & OBJECTIVE
Interval History: NAEON. Pt stated willingness to return to nursing home. Refused voiding trial several times, stated understanding that he would need to follow up with Urology when he left the hospital. Heme/Onc consulted for possible MM, appreciate recs.    Review of Systems   Constitutional:  Negative for chills and fever.   HENT:  Negative for congestion and sore throat.    Eyes:  Negative for photophobia and visual disturbance.   Respiratory:  Negative for cough and shortness of breath.    Cardiovascular:  Negative for chest pain and palpitations.   Gastrointestinal:  Negative for abdominal pain and constipation.   Genitourinary:  Negative for decreased urine volume, difficulty urinating, dysuria, flank pain, hematuria and urgency.   Musculoskeletal:  Positive for arthralgias and joint swelling. Negative for back pain.   Skin:  Positive for wound. Negative for rash.   Neurological:  Negative for dizziness and syncope.   Psychiatric/Behavioral:  Negative for agitation and behavioral problems.      Objective:     Vital Signs (Most Recent):  Temp: 97.9 °F (36.6 °C) (10/20/23 0434)  Pulse: 101 (10/20/23 0434)  Resp: 16 (10/20/23 0434)  BP: 129/85 (10/20/23 0434)  SpO2: 98 % (10/20/23 0434) Vital Signs (24h Range):  Temp:  [97.9 °F (36.6 °C)-98.4 °F (36.9 °C)] 97.9 °F (36.6 °C)  Pulse:  [] 101  Resp:  [16-18] 16  SpO2:  [96 %-99 %] 98 %  BP: (119-129)/(74-85) 129/85     Weight: 81.6 kg (180 lb)  Body mass index is 25.83 kg/m².    Intake/Output Summary (Last 24 hours) at 10/20/2023 0844  Last data filed at 10/19/2023 1200  Gross per 24 hour   Intake 240 ml   Output --   Net 240 ml         Physical Exam  Vitals reviewed.   Constitutional:       General: He is not in acute distress.     Appearance: Normal appearance. He is normal weight. He is ill-appearing.   HENT:      Head: Normocephalic and atraumatic.      Nose: No congestion or rhinorrhea.      Mouth/Throat:      Mouth: Mucous membranes are dry.       Pharynx: Oropharynx is clear.      Comments: Poor dentition  Cardiovascular:      Rate and Rhythm: Normal rate and regular rhythm.      Pulses: Normal pulses.      Heart sounds: Normal heart sounds.   Pulmonary:      Effort: Pulmonary effort is normal. No respiratory distress.      Breath sounds: Normal breath sounds.   Abdominal:      General: Bowel sounds are normal.      Palpations: Abdomen is soft.      Tenderness: There is no abdominal tenderness.   Genitourinary:     Comments: Fernandez draining appropriately.  Musculoskeletal:         General: Swelling, tenderness and deformity present.      Cervical back: Full passive range of motion without pain and normal range of motion.      Right lower leg: No edema.      Left lower leg: No edema.      Comments: Joint deformities noted bilateral wrists, with swelling and warmth on palpation   Skin:     General: Skin is warm and dry.   Neurological:      General: No focal deficit present.      Mental Status: He is alert and oriented to person, place, and time.   Psychiatric:         Mood and Affect: Mood normal.         Behavior: Behavior normal.             Significant Labs: All pertinent labs within the past 24 hours have been reviewed.    Significant Imaging: I have reviewed all pertinent imaging results/findings within the past 24 hours.

## 2023-10-20 NOTE — CHAPLAIN
Assessment & Plan     Pain in both wrists  No acute or concerning finding on exam.  Did elicit ulnar aspect pain on exam.  More consistent with strain, so will treat as such.  - Occupational Therapy Referral    Strain of both wrists, initial encounter  Discussed pathophysiology of condition.  Discussed benefits of OT- patient concurred.  Return precautions discussed and given to patient.  - Occupational Therapy Referral    Anxiety and depression  Stable per patient.  Refilled med as patient will be due for it later this month.  - escitalopram (LEXAPRO) 10 MG tablet  Dispense: 90 tablet; Refill: 3    Screening for human immunodeficiency virus  Offered screening based on current recommendations. Patient concurred to screen.  - HIV Antigen Antibody Combo  - HIV Antigen Antibody Combo    Need for hepatitis C screening test  Offered screening based on current recommendations. Patient concurred to screen.  - Hepatitis C Screen Reflex to HCV RNA Quant and Genotype  - Hepatitis C Screen Reflex to HCV RNA Quant and Genotype    Supraventricular tachycardia (H)  Patient has not reported any subsequent symptoms.  White seen ped cards in the past.  Patient Instructions     Occupational therapy  will call you in 2-3 business days.  If no improvement after 6-8 weeks of therapy, follow up in clinic.    Refilled lexapro today.    You agreed to screen for HIV and hepatitis C.  You will be contacted in 1-2 days for results of your lab tests.    Patient Education     Wrist Sprain  A sprain is an injury to the ligaments or capsule that holds a joint together. There are no broken bones. Most sprains take about 3 to 6 weeks to heal. If it a severe sprain where the ligament is completely torn, it can take months to recover.  Most wrist sprains are treated with a splint, wrist brace, or elastic wrap for support. Severe sprains may require surgery.  Home care    Keep your arm elevated to reduce pain and swelling. This is very  Palliative Care     Patient: Chirag Beckham  MRN: [id number]  : 1960  Age: 63 y.o.  Hospital Length of Stay: 3  Code Status: Code Status Discussion Note  Attending Provider: Osiel Castano MD  Principal Problem: Obstruction of Fernandez catheter    Attempted to visit pall med pt again per pall med request-- third attempt. First time yesterday morning he was eating breakfast and asked me to come back (tech has to help him), then at 1500 yesterday he was soundly sleeping and this morning his curtain was drawn and as I knocked and introduced myself through the curtain, pt said he was using the bathroom; told him I'd keep him in my prayers and will check on him again. Pt thanked me. Palliative  will [place] back.    **Spiritual Care Dept. Chaplains are available evenings, overnight and weekends **    In Peace,  Rev. Belinda Cardoso MDiv, [facility]  Board Certified   Palliative Medicine Department  [phone]    important during the first 48 hours.    Apply an ice pack over the injured area for 15 to 20 minutes every 3 to 6 hours. You should do this for the first 24 to 48 hours. You can make an ice pack by filling a plastic bag that seals at the top with ice cubes and then wrapping it with a thin towel. Continue to use ice packs for relief of pain and swelling as needed. As the ice melts, be careful to avoid getting your wrap, splint, or cast wet. After 48 hours, apply heat (warm shower or warm bath) for 15 to 20 minutes several times a day, or alternate ice and heat.     You may use over-the-counter pain medicine to control pain, unless another pain medicine was prescribed. If you have chronic liver or kidney disease or ever had a stomach ulcer or gastrointestinal bleeding, talk with your doctor before using these medicines.    If you were given a splint or brace, wear it for the time advised by your doctor.  Follow-up care  Follow up with your healthcare provider, or as advised. Any X-rays you had today don t show any broken bones, breaks, or fractures. Sometimes fractures don t show up on the first X-ray. Bruises and sprains can sometimes hurt as much as a fracture. These injuries can take time to heal completely. If your symptoms don t improve or they get worse, talk with your doctor. You may need a repeat X-ray. If X-rays were taken, you will be told of any new findings that may affect your care.  When to seek medical advice  Call your healthcare provider right away if any of these occur:    Pain or swelling increases    Fingers or hand becomes cold, blue, numb, or tingly   Dion last reviewed this educational content on 5/1/2018 2000-2021 [employer]. All rights reserved. This information is not intended as a substitute for professional medical care. Always follow your healthcare professional's instructions.               Return in about 2 months (around 5/16/2022) for In-clinic visit for if wrist  "pain does not improve.    Dallas Leroy MD  [facility] HospitalANTHONY Gutierrez is a 20 year old who presents for the following health issues     Musculoskeletal Problem    History of Present Illness       Reason for visit:  Wrist pain/injury  Symptom onset:  1-2 weeks ago  Symptoms include:  Constant wrist pain in both wrists  Symptom intensity:  Moderate  Symptom progression:  Staying the same  Had these symptoms before:  No  What makes it worse:  Bending or lifting or squeezing  What makes it better:  Massaging forearms    He eats 2-3 servings of fruits and vegetables daily.He consumes 3 sweetened beverage(s) daily.He exercises with enough effort to increase his heart rate 10 to 19 minutes per day.  He exercises with enough effort to increase his heart rate 4 days per week. He is missing 1 dose(s) of medications per week.     Patient has been lifting moderately heavy objects at work. Been there since at approx 8 months ago.      Concern - wrist pain  Onset: 3 weeks ago approx  Description: bilateral wrist pain along the ulnar aspect; can radiate to the distal ulnar forearm  Intensity: mild, moderate  Progression of Symptoms:  waxing and waning  Accompanying Signs & Symptoms: left fingers sometimes get numb and tingly since even before the above onset  Previous history of similar problem: none  Precipitating factors:        Worsened by: lifting, putting pressure on the area  Alleviating factors:        Improved by: massage forearms; \"crack my wrists\"  Therapies tried and outcome:  none     Review of Systems   C: NEGATIVE for fever, chills, change in weight  I: NEGATIVE for worrisome rashes, moles or lesions  MUSCULOSKELETAL:see above  N: NEGATIVE for weakness, dizziness or paresthesias  H: NEGATIVE for bleeding problems      Objective    /64 (BP Location: Left arm, Patient Position: Chair, Cuff Size: Adult Regular)   Pulse 56   Temp 97.3  F (36.3  C) (Tympanic)   Resp 20   Ht " "1.886 m (6' 2.25\")   Wt 74.4 kg (164 lb)   SpO2 99%   BMI 20.91 kg/m    Body mass index is 20.91 kg/m .  Physical Exam   GENERAL:  alert and no distress  BILAT WRIST/HAND: no swelling/discoloration/deformity; full range of motion of all joints with mild wrist pain only on the ulnar aspect in all directions; o hypoesthesia; no thenar atrophy; negative Tinel's; negative Phalen's; stsrong radial pulse bilaterally.  PSYCH: normal mood, appropriate affect    SKIN: no suspicious lesions, no rashes                "

## 2023-10-20 NOTE — CONSULTS
Consult Note:    Patient is a 63 year-old male with severe RA causing debility, Latent TB, Chronic hypercalcemia and normocytic anemia who presented to [facility] who presented for urinary retention and disla occlusion. On admission labs, he was found to have severe hypercalcemia at 12.9 and was admitted for further work-up. Additionally he has normocytic anemia with an H/H of 7.5/25.9 on admit. He has had a continued decline in his PS following August admission to [facility] where he was found to have latent TB and started on Rifampin. He also had an excisional LN biopsy which was negative on flow cytometry. His RA regimen was increased to assist with his debility. Given his hypercalcemia a MM panel was ordered with prelim studies of FLC showing a polyclonal pattern of 13.71/9.06, ratio of 1.51 less consistent with MM and more reactive. His Anemia is concerning for ACD given low transferrin saturation and high ferritin.     Recommendations:  -No urgent need for bone marrow biopsy until final MM panel results  -Will re-assess anemia as out-patient and assess utility of iron supplementation  -Follow-up MM Panel  -Management of co-morbidities per primary team  -Referral placed and orders for follow-up placed for DC  -Please contact us for any questions or concerns      Fermin Mir D.O.  Hematology/Oncology Fellow, PGY-V

## 2023-10-20 NOTE — PLAN OF CARE
10/20/23 1037   Post-Acute Status   Post-Acute Authorization Placement   Post-Acute Placement Status Pending post-acute provider review/more information requested   Coverage MEDICAID - [facility] ([place]ICA[place])   Discharge Plan   Discharge Plan A Return to nursing home   Discharge Plan B Community Services     SW notified [facility] via careport that patient ready for return to NH. SW sent NH orders via careport to [facility]. Patient dc to NH pending post-acute provider review.        2:00pm  SW received notification that [facility] accepts patient for dc to NH. Patient notified and agreeable to dc plan. Transport orders to follow.    SW will continue to follow.                AMBER Ernst, LMSW  [facility]  Case Management  Ext. [phone]

## 2023-10-20 NOTE — PROGRESS NOTES
Route Chart for Scheduling    BMT Chart Routing  Urgent    Follow up with physician 3 weeks. Dr. Mir on 11/6/23   Follow up with [facility]    Provider visit type    Infusion scheduling note    Injection scheduling note    Labs CBC, CMP, type and screen, iron and TIBC and ferritin   Scheduling:  Preferred lab:  Lab interval:  Labs prior to visit   Imaging    Pharmacy appointment    Other referrals

## 2023-10-20 NOTE — ASSESSMENT & PLAN NOTE
Placed in September 2023 due to retention 2/2 BPH.  [place] [facility] [place] to f/u.  Disla exchanged last admission.    - Patient refusing several voiding trials  - Patient voiced understanding that he would be discharged with disla and see Urology outpatient for removal

## 2023-10-20 NOTE — ASSESSMENT & PLAN NOTE
Supposed to follow up with [facility] for disla removal but was lost to f/u.  Urology consulted while inpatient; rec'ed following outpt with [facility].  Continue flomax.    - Patient refused voiding trial 10/19 x2  - Patient refused voiding trial 10/20

## 2023-10-21 NOTE — PLAN OF CARE
[facility] - Intensive Care ([facility])  Discharge Final Note    Primary Care Provider: Miguel Gardner MD    Expected Discharge Date: 10/20/2023    Final Discharge Note (most recent)       Final Note - 10/21/23 0940          Final Note    Assessment Type Final Discharge Note (P)      Anticipated Discharge Disposition -- (P)    [facility]  long-term [facility]    What phone number can be called within the next 1-3 days to see how you are doing after discharge? [phone] (P)         Post-Acute Status    Post-Acute Authorization Placement (P)      Post-Acute Placement Status Set-up Complete/Auth obtained (P)      Coverage MEDICAID - [facility] ([place] MEDICA[place]) (P)                      Important Message from Medicare             Contact Info       Miguel Gardner MD   Specialty: Internal Medicine, Family Medicine   Relationship: PCP - General    [facility] [address]  [address]  [facility] [address]   Phone: [phone]       Next Steps: Follow up in 1 week(s)    [facility] - Nephrology/Colon & Rectal/Urology   Specialty: Nephrology, Colon and Rectal Surgery, Urology    [address] [facility] [address]   Phone: [phone]       Next Steps: Follow up in 1 week(s)    Instructions: Fernandez evaluation          Patient dc to [facility].                AMBER Ernst, LMSW  [facility]  Case Management  Ext. [phone]

## 2023-10-21 NOTE — PLAN OF CARE
Problem: Infection  Goal: Absence of Infection Signs and Symptoms  Outcome: Adequate for Care Transition     Problem: Adult Inpatient Plan of Care  Goal: Plan of Care Review  Outcome: Adequate for Care Transition  Goal: Patient-Specific Goal (Individualized)  Outcome: Adequate for Care Transition  Goal: Absence of Hospital-Acquired Illness or Injury  Outcome: Adequate for Care Transition  Goal: Optimal Comfort and Wellbeing  Outcome: Adequate for Care Transition  Goal: Readiness for Transition of Care  Outcome: Adequate for Care Transition     Problem: Coping Ineffective  Goal: Effective Coping  Outcome: Adequate for Care Transition     Problem: Impaired Wound Healing  Goal: Optimal Wound Healing  Outcome: Adequate for Care Transition     Problem: Skin Injury Risk Increased  Goal: Skin Health and Integrity  Outcome: Adequate for Care Transition

## 2023-10-21 NOTE — NURSING
EMS here to transport patient to [facility]. Pt belongings accounted for and sent with the patient. AVS printed and previous shift RN reviewed with pt. All questions answered to best of ability.

## 2023-10-22 ENCOUNTER — HOSPITAL ENCOUNTER (OUTPATIENT)
Facility: HOSPITAL | Age: 63
Discharge: NURSING, REHAB OR LONG-TERM CARE | End: 2023-10-23
Attending: EMERGENCY MEDICINE | Admitting: STUDENT IN AN ORGANIZED HEALTH CARE EDUCATION/TRAINING PROGRAM
Payer: MEDICAID

## 2023-10-22 DIAGNOSIS — R06.02 SOB (SHORTNESS OF BREATH): ICD-10-CM

## 2023-10-22 DIAGNOSIS — I95.9 HYPOTENSION, UNSPECIFIED HYPOTENSION TYPE: Primary | ICD-10-CM

## 2023-10-22 DIAGNOSIS — R91.1 PULMONARY NODULE: ICD-10-CM

## 2023-10-22 DIAGNOSIS — R91.8 MULTIPLE LUNG NODULES ON CT: ICD-10-CM

## 2023-10-22 DIAGNOSIS — J44.1 COPD EXACERBATION: ICD-10-CM

## 2023-10-22 DIAGNOSIS — R07.9 CHEST PAIN: ICD-10-CM

## 2023-10-22 DIAGNOSIS — Z72.0 TOBACCO USER: ICD-10-CM

## 2023-10-22 DIAGNOSIS — E83.52 HYPERCALCEMIA: Chronic | ICD-10-CM

## 2023-10-22 LAB
ALBUMIN SERPL BCP-MCNC: 1.7 G/DL (ref 3.5–5.2)
ALP SERPL-CCNC: 80 U/L (ref 55–135)
ALT SERPL W/O P-5'-P-CCNC: 6 U/L (ref 10–44)
ANION GAP SERPL CALC-SCNC: 12 MMOL/L (ref 8–16)
AST SERPL-CCNC: 10 U/L (ref 10–40)
BACTERIA BLD CULT: NORMAL
BACTERIA BLD CULT: NORMAL
BASOPHILS # BLD AUTO: 0.01 K/UL (ref 0–0.2)
BASOPHILS NFR BLD: 0.1 % (ref 0–1.9)
BILIRUB SERPL-MCNC: 0.4 MG/DL (ref 0.1–1)
BNP SERPL-MCNC: 34 PG/ML (ref 0–99)
BUN SERPL-MCNC: 7 MG/DL (ref 8–23)
CALCIUM SERPL-MCNC: 9.9 MG/DL (ref 8.7–10.5)
CHLORIDE SERPL-SCNC: 100 MMOL/L (ref 95–110)
CO2 SERPL-SCNC: 24 MMOL/L (ref 23–29)
CREAT SERPL-MCNC: 0.6 MG/DL (ref 0.5–1.4)
DIFFERENTIAL METHOD: ABNORMAL
EOSINOPHIL # BLD AUTO: 0.1 K/UL (ref 0–0.5)
EOSINOPHIL NFR BLD: 0.8 % (ref 0–8)
ERYTHROCYTE [DISTWIDTH] IN BLOOD BY AUTOMATED COUNT: 15.7 % (ref 11.5–14.5)
EST. GFR  (NO RACE VARIABLE): >60 ML/MIN/1.73 M^2
GLUCOSE SERPL-MCNC: 97 MG/DL (ref 70–110)
HCT VFR BLD AUTO: 25.1 % (ref 40–54)
HGB BLD-MCNC: 7.5 G/DL (ref 14–18)
IMM GRANULOCYTES # BLD AUTO: 0.04 K/UL (ref 0–0.04)
IMM GRANULOCYTES NFR BLD AUTO: 0.4 % (ref 0–0.5)
LACTATE SERPL-SCNC: 0.9 MMOL/L (ref 0.5–2.2)
LYMPHOCYTES # BLD AUTO: 2.5 K/UL (ref 1–4.8)
LYMPHOCYTES NFR BLD: 27.3 % (ref 18–48)
MCH RBC QN AUTO: 26.9 PG (ref 27–31)
MCHC RBC AUTO-ENTMCNC: 29.9 G/DL (ref 32–36)
MCV RBC AUTO: 90 FL (ref 82–98)
MONOCYTES # BLD AUTO: 0.9 K/UL (ref 0.3–1)
MONOCYTES NFR BLD: 10.1 % (ref 4–15)
NEUTROPHILS # BLD AUTO: 5.5 K/UL (ref 1.8–7.7)
NEUTROPHILS NFR BLD: 61.3 % (ref 38–73)
NRBC BLD-RTO: 0 /100 WBC
PLATELET # BLD AUTO: 273 K/UL (ref 150–450)
PMV BLD AUTO: 9.3 FL (ref 9.2–12.9)
POC CARDIAC TROPONIN I: 0 NG/ML (ref 0–0.08)
POTASSIUM SERPL-SCNC: 3.8 MMOL/L (ref 3.5–5.1)
PROT SERPL-MCNC: 7.4 G/DL (ref 6–8.4)
RBC # BLD AUTO: 2.79 M/UL (ref 4.6–6.2)
SAMPLE: NORMAL
SODIUM SERPL-SCNC: 136 MMOL/L (ref 136–145)
TROPONIN I SERPL DL<=0.01 NG/ML-MCNC: 0.01 NG/ML (ref 0–0.03)
WBC # BLD AUTO: 9.01 K/UL (ref 3.9–12.7)

## 2023-10-22 PROCEDURE — 51702 INSERT TEMP BLADDER CATH: CPT

## 2023-10-22 PROCEDURE — 63600175 PHARM REV CODE 636 W HCPCS: Performed by: STUDENT IN AN ORGANIZED HEALTH CARE EDUCATION/TRAINING PROGRAM

## 2023-10-22 PROCEDURE — 93005 ELECTROCARDIOGRAM TRACING: CPT | Mod: 59

## 2023-10-22 PROCEDURE — 81001 URINALYSIS AUTO W/SCOPE: CPT | Performed by: EMERGENCY MEDICINE

## 2023-10-22 PROCEDURE — 87040 BLOOD CULTURE FOR BACTERIA: CPT | Performed by: EMERGENCY MEDICINE

## 2023-10-22 PROCEDURE — 84100 ASSAY OF PHOSPHORUS: CPT | Performed by: EMERGENCY MEDICINE

## 2023-10-22 PROCEDURE — 84484 ASSAY OF TROPONIN QUANT: CPT | Performed by: EMERGENCY MEDICINE

## 2023-10-22 PROCEDURE — 93010 ELECTROCARDIOGRAM REPORT: CPT | Mod: ,,, | Performed by: INTERNAL MEDICINE

## 2023-10-22 PROCEDURE — 87086 URINE CULTURE/COLONY COUNT: CPT | Performed by: EMERGENCY MEDICINE

## 2023-10-22 PROCEDURE — 83605 ASSAY OF LACTIC ACID: CPT | Performed by: EMERGENCY MEDICINE

## 2023-10-22 PROCEDURE — 99285 EMERGENCY DEPT VISIT HI MDM: CPT | Mod: 25

## 2023-10-22 PROCEDURE — 93005 ELECTROCARDIOGRAM TRACING: CPT

## 2023-10-22 PROCEDURE — 93010 ELECTROCARDIOGRAM REPORT: CPT | Mod: 59,,, | Performed by: INTERNAL MEDICINE

## 2023-10-22 PROCEDURE — 96365 THER/PROPH/DIAG IV INF INIT: CPT | Mod: 59

## 2023-10-22 PROCEDURE — 96375 TX/PRO/DX INJ NEW DRUG ADDON: CPT | Mod: 59

## 2023-10-22 PROCEDURE — 85025 COMPLETE CBC W/AUTO DIFF WBC: CPT | Performed by: EMERGENCY MEDICINE

## 2023-10-22 PROCEDURE — 83735 ASSAY OF MAGNESIUM: CPT | Performed by: EMERGENCY MEDICINE

## 2023-10-22 PROCEDURE — 80053 COMPREHEN METABOLIC PANEL: CPT | Performed by: EMERGENCY MEDICINE

## 2023-10-22 PROCEDURE — 93010 EKG 12-LEAD: ICD-10-PCS | Mod: 59,,, | Performed by: INTERNAL MEDICINE

## 2023-10-22 PROCEDURE — 84484 ASSAY OF TROPONIN QUANT: CPT

## 2023-10-22 PROCEDURE — 25000003 PHARM REV CODE 250: Performed by: STUDENT IN AN ORGANIZED HEALTH CARE EDUCATION/TRAINING PROGRAM

## 2023-10-22 PROCEDURE — 83880 ASSAY OF NATRIURETIC PEPTIDE: CPT | Performed by: EMERGENCY MEDICINE

## 2023-10-22 RX ORDER — FENTANYL CITRATE 50 UG/ML
25 INJECTION, SOLUTION INTRAMUSCULAR; INTRAVENOUS
Status: COMPLETED | OUTPATIENT
Start: 2023-10-22 | End: 2023-10-22

## 2023-10-22 RX ADMIN — PIPERACILLIN SODIUM AND TAZOBACTAM SODIUM 4.5 G: 4; .5 INJECTION, POWDER, FOR SOLUTION INTRAVENOUS at 11:10

## 2023-10-22 RX ADMIN — SODIUM CHLORIDE, POTASSIUM CHLORIDE, SODIUM LACTATE AND CALCIUM CHLORIDE 1000 ML: 600; 310; 30; 20 INJECTION, SOLUTION INTRAVENOUS at 11:10

## 2023-10-22 RX ADMIN — FENTANYL CITRATE 25 MCG: 50 INJECTION, SOLUTION INTRAMUSCULAR; INTRAVENOUS at 11:10

## 2023-10-22 RX ADMIN — VANCOMYCIN HYDROCHLORIDE 2000 MG: 10 INJECTION, POWDER, LYOPHILIZED, FOR SOLUTION INTRAVENOUS at 11:10

## 2023-10-22 NOTE — DISCHARGE SUMMARY
[facility] Intensive Care ([address] Medicine  Discharge Summary      Patient Name: Chirag Beckham  MRN: [id number]  JASON: [id number]  Patient Class: IP- Inpatient  Admission Date: 10/16/2023  Hospital Length of Stay: 3 days  Discharge Date and Time: 10/20/2023  7:52 PM  Attending Physician: [place] att. 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 Pt presented from [facility] due to disla obstruction. Patient reports waking up morning of 10/16 and having suprapubic fullness and pain with no urine output from disla catheter. He says that nursing staff would not exchange the disla at NH. Pt denies seeing any blood clots or hematuria. He received the disla on 10/10 after recent hospitalization on 10/6, which was also due to needing his disla catheter replaced after presenting with lower abdominal tenderness and distension. In the ED, pt tachycardic and had his Disla exchanged with immediate improvement in his symptoms. Nurse reports seeing what appeared to be sediment/debris, prior to exchanging Disla. Patient denies any other symptoms on ROS asides from arthralgias and joint swelling.    In the ED, pt hemodynamically stable and normotensive. Tachycardic with HR up to 135, stable on room air. Pt had disla exchanged in the ED with immediate improvement in symptoms after urine was drained. Urine studies and culture obtained. Sepsis workup was initiated with fluid bolus of lactated Ringer's. UA positive for many bacteria and leukocytes, patient given 1 g ceftriaxone. Other labs notable for Hgb 6.8, slightly  decreased from baseline. Patient was transfused 1U pRBCs. CMP notable for K 3.2, albumin 1.8, and elevated calcium at 11.9 which is similar to his baseline. Patient admitted to Hospital medicine for further management.      * No surgery found *      Hospital Course:   Disla placed for urinary retention 2/2 BPH in September. Pt was instructed to follow up with [facility] but was lost to f/u. Hypercalcemia treated with IV hydration. OSH workup showed SPEP WNL and peripheral smear unremarkable; B2 microglobulin, light chains, IgG, and IgA elevated; Heme/Onc consulted. Patient refusing voiding trial inpatient and will be discharged with disla and plans to follow up outpatient.          Review of Systems   Constitutional:  Negative for chills and fever.   HENT:  Negative for congestion and sore throat.    Eyes:  Negative for photophobia and visual disturbance.   Respiratory:  Negative for cough and shortness of breath.    Cardiovascular:  Negative for chest pain and palpitations.   Gastrointestinal:  Negative for abdominal pain and constipation.   Genitourinary:  Negative for decreased urine volume, difficulty urinating, dysuria, flank pain, hematuria and urgency.   Musculoskeletal:  Positive for arthralgias and joint swelling. Negative for back pain.   Skin:  Positive for wound. Negative for rash.   Neurological:  Negative for dizziness and syncope.   Psychiatric/Behavioral:  Negative for agitation and behavioral problems.       Vitals:    10/20/23 1100 10/20/23 1207 10/20/23 1533 10/20/23 1834   BP: 127/83  117/80    BP Location: Right arm  Right arm    Patient Position: Lying  Lying    Pulse: 100 101 109 110   Resp: 16  16    Temp: 98.5 °F (36.9 °C)  98.6 °F (37 °C)    TempSrc: Oral  Oral    SpO2: 97%  98%    Weight:              Physical Exam  Vitals reviewed.   Constitutional:       General: He is not in acute distress.     Appearance: Normal appearance. He is normal weight. He is ill-appearing.   HENT:      Head:  Normocephalic and atraumatic.      Nose: No congestion or rhinorrhea.      Mouth/Throat:      Mouth: Mucous membranes are dry.      Pharynx: Oropharynx is clear.      Comments: Poor dentition  Cardiovascular:      Rate and Rhythm: Normal rate and regular rhythm.      Pulses: Normal pulses.      Heart sounds: Normal heart sounds.   Pulmonary:      Effort: Pulmonary effort is normal. No respiratory distress.      Breath sounds: Normal breath sounds.   Abdominal:      General: Bowel sounds are normal.      Palpations: Abdomen is soft.      Tenderness: There is no abdominal tenderness.   Genitourinary:     Comments: Disla draining appropriately.  Musculoskeletal:         General: Swelling, tenderness and deformity present.      Cervical back: Full passive range of motion without pain and normal range of motion.      Right lower leg: No edema.      Left lower leg: No edema.      Comments: Joint deformities noted bilateral wrists, with swelling and warmth on palpation   Skin:     General: Skin is warm and dry.   Neurological:      General: No focal deficit present.      Mental Status: He is alert and oriented to person, place, and time.   Psychiatric:         Mood and Affect: Mood normal.         Behavior: Behavior normal.       Goals of Care Treatment Preferences:  Code Status: Full Code      Consults:   Consults (From admission, onward)        Status Ordering Provider     Inpatient consult to Hematology/Oncology  Once        Provider:  (Not yet assigned)    Completed GAYE BUCK     Inpatient consult to Palliative Care  Once        Provider:  (Not yet assigned)    GAYE Quezada          Renal/  Chronic indwelling Disla catheter  Placed in September 2023 due to retention 2/2 BPH.  Planned for removal with [facility] but lost to f/u.  Disla exchanged last admission.    - Patient refusing several voiding trials  - Patient voiced understanding that he would be discharged with disla and see Urology outpatient for  removal      Hypercalcemia  The patient has hypercalcemia that is currently uncontrolled. The patient has the following symptoms due to their hypercalcemia: asymptomatic at present, however may be contributing to disla obstruction. The hypercalcemia is likely due to poor oral intake and dehydration, and bedbound status. We will obtain the following labs to work up the hypercalcemia:     PTH, Intact   Date Value Ref Range Status   10/07/2023 12.9 9.0 - 77.0 pg/mL Final    . We will treat the hypercalcemia with: IV fluids ordered at a rate of 200cc ml/hr. Their latest calcium has been reviewed and is listed below.  Ionized Calcium   Date Value Ref Range Status   10/07/2023 1.65 (H) 1.06 - 1.42 mmol/L Final       - Continue fluids with NS 200cc/hr while corrected Ca2+ < 12  - Encourage oral rehydration  - Consider furosemide if severe (corrected Ca2+ > 12)  - Follow up PTHr  - Previous SPEP and smear WNL at [facility]  - IgG, IgA and B2 microglobulin, light chains elevated; Heme/Onc consulted, appreciate recs    Urinary retention due to benign prostatic hyperplasia  Supposed to follow up with [facility] for disla removal but was lost to f/u.  Urology consulted while inpatient; rec'ed following outpt with [facility].  Continue flomax.    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] 10/06/2023 Yes    Urinary retention due to benign prostatic hyperplasia [N40.1, R33.8] 09/22/2023 Yes     Chronic    Hypercalcemia [E83.52] 08/28/2023 Yes     Chronic    Rheumatoid arthritis involving multiple sites with  positive rheumatoid factor [M05.79] 07/12/2023 Yes     Chronic    Iron deficiency anemia [D50.9] 06/13/2023 Yes     Chronic      Problems Resolved During this Admission:    Diagnosis Date Noted Date Resolved POA    Urinary tract infection associated with indwelling urethral catheter [T83.511A, N39.0] 10/07/2023 10/17/2023 Yes       Discharged Condition: stable    Disposition: Home or Self Care    Follow Up:   Follow-up Information     Miguel Gardner MD Follow up in 1 week(s).    Specialties: Internal Medicine, Family Medicine  Contact information:  [address]  [address]  [facility] [address]  [phone]             Rectal/Urology, [facility] - Nephrology/Colon & Follow up in 1 week(s).    Specialties: Nephrology, Colon and Rectal Surgery, Urology  Why: Fernandez evaluation  Contact information:  [address] [facility] [address]  [phone]                       Patient Instructions:      Ambulatory referral/consult to Hematology / Oncology   Standing Status: Future   Referral Priority: Routine Referral Type: Consultation   Referral Reason: Specialty Services Required   Requested Specialty: Hematology and Oncology   Number of Visits Requested: 1       Significant Diagnostic Studies: N/A    Pending Diagnostic Studies:     Procedure Component Value Units Date/Time    PTH-related peptide [[id number]] Collected: 10/18/23 1109    Order Status: Sent Lab Status: In process Updated: 10/18/23 1125    Specimen: Blood     Narrative:      Collection has been rescheduled by CNW2 at 10/18/2023 04:13 Reason:   Pt wants lab to return @7:30   Collection has been rescheduled by CNW2 at 10/18/2023 04:14 Reason:   Nurse Tanvi notified [id number]  Collection has been rescheduled by LJL at 10/18/2023 10:52 Reason:   Patient unavailable with doctors spoke with nurse  Braulio         Medications:  Reconciled Home Medications:      Medication List      START taking these medications    potassium bicarbonate  disintegrating tablet  Commonly known as: K-LYTE  Take 2 tablets (40 mEq total) by mouth once daily.        CONTINUE taking these medications    acetaminophen 325 MG tablet  Commonly known as: TYLENOL  Take 975 mg by mouth every 6 (six) hours as needed for Pain.     diclofenac sodium 1 % Gel  Commonly known as: VOLTAREN  Apply 2 g topically 4 (four) times daily as needed (hand pain). (Do not apply to open wounds, eyes or mucus membrane)     folic acid 1 MG tablet  Commonly known as: FOLVITE  Take 1 mg by mouth once daily.     hydroxychloroquine 300 mg Tab  Take 300 mg by mouth once daily.     melatonin 5 mg  Commonly known as: MELATIN  Take 5 mg by mouth nightly.     methotrexate (PF) 12.5 mg/0.5 mL Syrg  Inject 25 mg into the skin every Sunday. Hold until cephalexin course finished.     mirtazapine 15 MG tablet  Commonly known as: REMERON  Take 1 tablet (15 mg total) by mouth every evening.     MULTIVITAMIN WITH IRON ORAL  Take 1 tablet by mouth once daily.     oxyCODONE 5 MG immediate release tablet  Commonly known as: ROXICODONE  Take 5 mg by mouth every 6 (six) hours as needed for Pain.     polyethylene glycol 17 gram Pwpk  Commonly known as: GLYCOLAX  Mix 17 gm with 6 to 8 ounces of water and take daily.     predniSONE 10 MG tablet  Commonly known as: DELTASONE  Take 10 mg by mouth every evening.     rifAMpin 300 MG capsule  Commonly known as: RIFADIN  Take 600 mg by mouth once daily.     sulfaSALAzine 500 mg Tab  Commonly known as: AZULFIDINE  Take 1,000 mg by mouth 2 (two) times daily.     tamsulosin 0.4 mg Cap  Commonly known as: FLOMAX  Take 1 capsule by mouth nightly.     thiamine 100 MG tablet  Take 1 tablet by mouth every evening.     tiZANidine 2 MG tablet  Commonly known as: ZANAFLEX  Take 2 mg by mouth every 6 (six) hours as needed (spasms).        STOP taking these medications    famotidine 20 MG tablet  Commonly known as: PEPCID            Indwelling Lines/Drains at time of discharge:    Lines/Drains/Airways     Fernandez                 Time spent on the discharge of patient: 40 minutes         Beatrice Guerin MD  Department of Hospital Medicine  [facility] - Intensive Care ([place]-[facility])

## 2023-10-23 ENCOUNTER — PATIENT OUTREACH (OUTPATIENT)
Dept: ADMINISTRATIVE | Facility: CLINIC | Age: 63
End: 2023-10-23
Payer: MEDICAID

## 2023-10-23 VITALS
RESPIRATION RATE: 18 BRPM | HEIGHT: 70 IN | TEMPERATURE: 98 F | SYSTOLIC BLOOD PRESSURE: 110 MMHG | DIASTOLIC BLOOD PRESSURE: 70 MMHG | WEIGHT: 180 LBS | HEART RATE: 101 BPM | OXYGEN SATURATION: 98 % | BODY MASS INDEX: 25.77 KG/M2

## 2023-10-23 PROBLEM — R91.8 MULTIPLE LUNG NODULES ON CT: Status: ACTIVE | Noted: 2023-10-23

## 2023-10-23 PROBLEM — R07.9 CHEST PAIN: Status: ACTIVE | Noted: 2023-10-23

## 2023-10-23 PROBLEM — L89.152 PRESSURE INJURY OF SACRAL REGION, STAGE 2: Status: ACTIVE | Noted: 2023-10-23

## 2023-10-23 PROBLEM — R06.02 SHORTNESS OF BREATH: Status: ACTIVE | Noted: 2023-10-23

## 2023-10-23 LAB
1,25(OH)2D3 SERPL-MCNC: <5 PG/ML (ref 20–79)
ALBUMIN SERPL BCP-MCNC: 1.6 G/DL (ref 3.5–5.2)
ALP SERPL-CCNC: 73 U/L (ref 55–135)
ALT SERPL W/O P-5'-P-CCNC: <5 U/L (ref 10–44)
ANION GAP SERPL CALC-SCNC: 9 MMOL/L (ref 8–16)
AST SERPL-CCNC: 8 U/L (ref 10–40)
AV INDEX (PROSTH): 0.83
AV MEAN GRADIENT: 3 MMHG
AV PEAK GRADIENT: 6 MMHG
AV VALVE AREA BY VELOCITY RATIO: 2.75 CM²
AV VALVE AREA: 2.88 CM²
AV VELOCITY RATIO: 0.79
BACTERIA #/AREA URNS AUTO: ABNORMAL /HPF
BASOPHILS # BLD AUTO: 0.01 K/UL (ref 0–0.2)
BASOPHILS NFR BLD: 0.1 % (ref 0–1.9)
BILIRUB SERPL-MCNC: 0.3 MG/DL (ref 0.1–1)
BILIRUB UR QL STRIP: NEGATIVE
BSA FOR ECHO PROCEDURE: 2.01 M2
BUN SERPL-MCNC: 6 MG/DL (ref 8–23)
CALCIUM SERPL-MCNC: 9.6 MG/DL (ref 8.7–10.5)
CHLORIDE SERPL-SCNC: 101 MMOL/L (ref 95–110)
CLARITY UR REFRACT.AUTO: ABNORMAL
CO2 SERPL-SCNC: 25 MMOL/L (ref 23–29)
COLOR UR AUTO: YELLOW
CREAT SERPL-MCNC: 0.6 MG/DL (ref 0.5–1.4)
CV ECHO LV RWT: 0.39 CM
DIFFERENTIAL METHOD: ABNORMAL
DOP CALC AO PEAK VEL: 1.18 M/S
DOP CALC AO VTI: 17.24 CM
DOP CALC LVOT AREA: 3.5 CM2
DOP CALC LVOT DIAMETER: 2.11 CM
DOP CALC LVOT PEAK VEL: 0.93 M/S
DOP CALC LVOT STROKE VOLUME: 49.73 CM3
DOP CALCLVOT PEAK VEL VTI: 14.23 CM
E WAVE DECELERATION TIME: 112.74 MSEC
E/A RATIO: 0.82
E/E' RATIO: 5.9 M/S
ECHO LV POSTERIOR WALL: 0.88 CM (ref 0.6–1.1)
EOSINOPHIL # BLD AUTO: 0.1 K/UL (ref 0–0.5)
EOSINOPHIL NFR BLD: 1.2 % (ref 0–8)
ERYTHROCYTE [DISTWIDTH] IN BLOOD BY AUTOMATED COUNT: 18.9 % (ref 11.5–14.5)
EST. GFR  (NO RACE VARIABLE): >60 ML/MIN/1.73 M^2
FRACTIONAL SHORTENING: 37 % (ref 28–44)
GLUCOSE SERPL-MCNC: 99 MG/DL (ref 70–110)
GLUCOSE UR QL STRIP: NEGATIVE
HCT VFR BLD AUTO: 24.4 % (ref 40–54)
HGB BLD-MCNC: 7.4 G/DL (ref 14–18)
HGB UR QL STRIP: ABNORMAL
HYALINE CASTS UR QL AUTO: 0 /LPF
IMM GRANULOCYTES # BLD AUTO: 0.03 K/UL (ref 0–0.04)
IMM GRANULOCYTES NFR BLD AUTO: 0.4 % (ref 0–0.5)
INTERVENTRICULAR SEPTUM: 0.89 CM (ref 0.6–1.1)
KETONES UR QL STRIP: NEGATIVE
LA MAJOR: 4.53 CM
LA MINOR: 4.89 CM
LA WIDTH: 3.4 CM
LEFT ATRIUM SIZE: 3.51 CM
LEFT ATRIUM VOLUME INDEX MOD: 9 ML/M2
LEFT ATRIUM VOLUME INDEX: 23.9 ML/M2
LEFT ATRIUM VOLUME MOD: 18 CM3
LEFT ATRIUM VOLUME: 47.71 CM3
LEFT INTERNAL DIMENSION IN SYSTOLE: 2.88 CM (ref 2.1–4)
LEFT VENTRICLE DIASTOLIC VOLUME INDEX: 47.21 ML/M2
LEFT VENTRICLE DIASTOLIC VOLUME: 94.42 ML
LEFT VENTRICLE MASS INDEX: 66 G/M2
LEFT VENTRICLE SYSTOLIC VOLUME INDEX: 15.9 ML/M2
LEFT VENTRICLE SYSTOLIC VOLUME: 31.76 ML
LEFT VENTRICULAR INTERNAL DIMENSION IN DIASTOLE: 4.54 CM (ref 3.5–6)
LEFT VENTRICULAR MASS: 131.78 G
LEUKOCYTE ESTERASE UR QL STRIP: ABNORMAL
LV LATERAL E/E' RATIO: 5.9 M/S
LV SEPTAL E/E' RATIO: 5.9 M/S
LYMPHOCYTES # BLD AUTO: 1.8 K/UL (ref 1–4.8)
LYMPHOCYTES NFR BLD: 23.1 % (ref 18–48)
MAGNESIUM SERPL-MCNC: 1.5 MG/DL (ref 1.6–2.6)
MAGNESIUM SERPL-MCNC: 1.6 MG/DL (ref 1.6–2.6)
MCH RBC QN AUTO: 28.1 PG (ref 27–31)
MCHC RBC AUTO-ENTMCNC: 30.3 G/DL (ref 32–36)
MCV RBC AUTO: 93 FL (ref 82–98)
MICROSCOPIC COMMENT: ABNORMAL
MONOCYTES # BLD AUTO: 0.7 K/UL (ref 0.3–1)
MONOCYTES NFR BLD: 9.5 % (ref 4–15)
MV A" WAVE DURATION": 4 MSEC
MV PEAK A VEL: 0.72 M/S
MV PEAK E VEL: 0.59 M/S
MV STENOSIS PRESSURE HALF TIME: 32.7 MS
MV VALVE AREA P 1/2 METHOD: 6.73 CM2
NEUTROPHILS # BLD AUTO: 5.1 K/UL (ref 1.8–7.7)
NEUTROPHILS NFR BLD: 65.7 % (ref 38–73)
NITRITE UR QL STRIP: NEGATIVE
NRBC BLD-RTO: 0 /100 WBC
PH UR STRIP: >8 [PH] (ref 5–8)
PHOSPHATE SERPL-MCNC: 2.9 MG/DL (ref 2.7–4.5)
PHOSPHATE SERPL-MCNC: 3.4 MG/DL (ref 2.7–4.5)
PLATELET # BLD AUTO: 336 K/UL (ref 150–450)
PMV BLD AUTO: 12.4 FL (ref 9.2–12.9)
POC CARDIAC TROPONIN I: 0 NG/ML (ref 0–0.08)
POTASSIUM SERPL-SCNC: 3.4 MMOL/L (ref 3.5–5.1)
PROT SERPL-MCNC: 6.7 G/DL (ref 6–8.4)
PROT UR QL STRIP: ABNORMAL
PULM VEIN S/D RATIO: 1.23
PV PEAK D VEL: 0.39 M/S
PV PEAK S VEL: 0.48 M/S
RA MAJOR: 4.09 CM
RA PRESSURE ESTIMATED: 3 MMHG
RA WIDTH: 4.07 CM
RBC # BLD AUTO: 2.63 M/UL (ref 4.6–6.2)
RBC #/AREA URNS AUTO: 15 /HPF (ref 0–4)
RIGHT VENTRICULAR END-DIASTOLIC DIMENSION: 3.69 CM
SAMPLE: NORMAL
SINUS: 3.8 CM
SODIUM SERPL-SCNC: 135 MMOL/L (ref 136–145)
SP GR UR STRIP: 1.01 (ref 1–1.03)
STJ: 3.11 CM
TDI LATERAL: 0.1 M/S
TDI SEPTAL: 0.1 M/S
TDI: 0.1 M/S
TRICUSPID ANNULAR PLANE SYSTOLIC EXCURSION: 1.91 CM
TROPONIN I SERPL DL<=0.01 NG/ML-MCNC: 0.01 NG/ML (ref 0–0.03)
URN SPEC COLLECT METH UR: ABNORMAL
WBC # BLD AUTO: 7.82 K/UL (ref 3.9–12.7)
WBC #/AREA URNS AUTO: 58 /HPF (ref 0–5)
Z-SCORE OF LEFT VENTRICULAR DIMENSION IN END DIASTOLE: -2.5
Z-SCORE OF LEFT VENTRICULAR DIMENSION IN END SYSTOLE: -1.72

## 2023-10-23 PROCEDURE — 63600175 PHARM REV CODE 636 W HCPCS

## 2023-10-23 PROCEDURE — 25000003 PHARM REV CODE 250

## 2023-10-23 PROCEDURE — 96366 THER/PROPH/DIAG IV INF ADDON: CPT

## 2023-10-23 PROCEDURE — 80053 COMPREHEN METABOLIC PANEL: CPT | Performed by: STUDENT IN AN ORGANIZED HEALTH CARE EDUCATION/TRAINING PROGRAM

## 2023-10-23 PROCEDURE — 63700000 PHARM REV CODE 250 ALT 637 W/O HCPCS

## 2023-10-23 PROCEDURE — 84484 ASSAY OF TROPONIN QUANT: CPT

## 2023-10-23 PROCEDURE — G0378 HOSPITAL OBSERVATION PER HR: HCPCS

## 2023-10-23 PROCEDURE — 63600175 PHARM REV CODE 636 W HCPCS: Performed by: STUDENT IN AN ORGANIZED HEALTH CARE EDUCATION/TRAINING PROGRAM

## 2023-10-23 PROCEDURE — 84484 ASSAY OF TROPONIN QUANT: CPT | Performed by: EMERGENCY MEDICINE

## 2023-10-23 PROCEDURE — 99204 OFFICE O/P NEW MOD 45 MIN: CPT | Mod: ,,, | Performed by: INTERNAL MEDICINE

## 2023-10-23 PROCEDURE — 96367 TX/PROPH/DG ADDL SEQ IV INF: CPT

## 2023-10-23 PROCEDURE — 94761 N-INVAS EAR/PLS OXIMETRY MLT: CPT

## 2023-10-23 PROCEDURE — 94640 AIRWAY INHALATION TREATMENT: CPT

## 2023-10-23 PROCEDURE — 83735 ASSAY OF MAGNESIUM: CPT | Performed by: STUDENT IN AN ORGANIZED HEALTH CARE EDUCATION/TRAINING PROGRAM

## 2023-10-23 PROCEDURE — 25000003 PHARM REV CODE 250: Performed by: STUDENT IN AN ORGANIZED HEALTH CARE EDUCATION/TRAINING PROGRAM

## 2023-10-23 PROCEDURE — 25000242 PHARM REV CODE 250 ALT 637 W/ HCPCS

## 2023-10-23 PROCEDURE — 96361 HYDRATE IV INFUSION ADD-ON: CPT

## 2023-10-23 PROCEDURE — 99204 PR OFFICE/OUTPT VISIT, NEW, LEVL IV, 45-59 MIN: ICD-10-PCS | Mod: ,,, | Performed by: INTERNAL MEDICINE

## 2023-10-23 PROCEDURE — 25500020 PHARM REV CODE 255: Performed by: EMERGENCY MEDICINE

## 2023-10-23 PROCEDURE — 85025 COMPLETE CBC W/AUTO DIFF WBC: CPT

## 2023-10-23 PROCEDURE — 84100 ASSAY OF PHOSPHORUS: CPT | Performed by: STUDENT IN AN ORGANIZED HEALTH CARE EDUCATION/TRAINING PROGRAM

## 2023-10-23 RX ORDER — IBUPROFEN 200 MG
24 TABLET ORAL
Status: DISCONTINUED | OUTPATIENT
Start: 2023-10-23 | End: 2023-10-23 | Stop reason: HOSPADM

## 2023-10-23 RX ORDER — IPRATROPIUM BROMIDE AND ALBUTEROL SULFATE 2.5; .5 MG/3ML; MG/3ML
3 SOLUTION RESPIRATORY (INHALATION) EVERY 4 HOURS
Status: COMPLETED | OUTPATIENT
Start: 2023-10-23 | End: 2023-10-23

## 2023-10-23 RX ORDER — ACETAMINOPHEN 325 MG/1
650 TABLET ORAL EVERY 4 HOURS PRN
Status: DISCONTINUED | OUTPATIENT
Start: 2023-10-23 | End: 2023-10-23 | Stop reason: HOSPADM

## 2023-10-23 RX ORDER — MAGNESIUM SULFATE HEPTAHYDRATE 40 MG/ML
4 INJECTION, SOLUTION INTRAVENOUS ONCE
Status: COMPLETED | OUTPATIENT
Start: 2023-10-23 | End: 2023-10-23

## 2023-10-23 RX ORDER — OXYCODONE HYDROCHLORIDE 5 MG/1
5 TABLET ORAL ONCE
Status: COMPLETED | OUTPATIENT
Start: 2023-10-23 | End: 2023-10-23

## 2023-10-23 RX ORDER — POTASSIUM CHLORIDE 20 MEQ/1
40 TABLET, EXTENDED RELEASE ORAL ONCE
Status: COMPLETED | OUTPATIENT
Start: 2023-10-23 | End: 2023-10-23

## 2023-10-23 RX ORDER — AZITHROMYCIN 250 MG/1
250 TABLET, FILM COATED ORAL DAILY
Qty: 4 TABLET | Refills: 0 | Status: SHIPPED | OUTPATIENT
Start: 2023-10-24 | End: 2023-10-28

## 2023-10-23 RX ORDER — ENOXAPARIN SODIUM 100 MG/ML
40 INJECTION SUBCUTANEOUS EVERY 24 HOURS
Status: DISCONTINUED | OUTPATIENT
Start: 2023-10-23 | End: 2023-10-23 | Stop reason: HOSPADM

## 2023-10-23 RX ORDER — AZITHROMYCIN 250 MG/1
500 TABLET, FILM COATED ORAL ONCE
Status: COMPLETED | OUTPATIENT
Start: 2023-10-23 | End: 2023-10-23

## 2023-10-23 RX ORDER — IPRATROPIUM BROMIDE AND ALBUTEROL SULFATE 2.5; .5 MG/3ML; MG/3ML
3 SOLUTION RESPIRATORY (INHALATION) EVERY 4 HOURS
Qty: 75 ML | Refills: 0 | Status: SHIPPED | OUTPATIENT
Start: 2023-10-23 | End: 2024-10-22

## 2023-10-23 RX ORDER — TAMSULOSIN HYDROCHLORIDE 0.4 MG/1
1 CAPSULE ORAL NIGHTLY
Status: DISCONTINUED | OUTPATIENT
Start: 2023-10-23 | End: 2023-10-23 | Stop reason: HOSPADM

## 2023-10-23 RX ORDER — DICLOFENAC SODIUM 10 MG/G
2 GEL TOPICAL 4 TIMES DAILY PRN
Status: DISCONTINUED | OUTPATIENT
Start: 2023-10-23 | End: 2023-10-23 | Stop reason: HOSPADM

## 2023-10-23 RX ORDER — IPRATROPIUM BROMIDE AND ALBUTEROL SULFATE 2.5; .5 MG/3ML; MG/3ML
SOLUTION RESPIRATORY (INHALATION)
Status: COMPLETED
Start: 2023-10-23 | End: 2023-10-23

## 2023-10-23 RX ORDER — SULFASALAZINE 500 MG/1
1000 TABLET ORAL 2 TIMES DAILY
Status: DISCONTINUED | OUTPATIENT
Start: 2023-10-23 | End: 2023-10-23 | Stop reason: HOSPADM

## 2023-10-23 RX ORDER — MAGNESIUM SULFATE HEPTAHYDRATE 40 MG/ML
2 INJECTION, SOLUTION INTRAVENOUS ONCE
Status: COMPLETED | OUTPATIENT
Start: 2023-10-23 | End: 2023-10-23

## 2023-10-23 RX ORDER — TIOTROPIUM BROMIDE AND OLODATEROL 3.124; 2.736 UG/1; UG/1
2 SPRAY, METERED RESPIRATORY (INHALATION) DAILY
Qty: 4 G | Refills: 3 | Status: SHIPPED | OUTPATIENT
Start: 2023-10-23

## 2023-10-23 RX ORDER — IPRATROPIUM BROMIDE AND ALBUTEROL SULFATE 2.5; .5 MG/3ML; MG/3ML
3 SOLUTION RESPIRATORY (INHALATION) EVERY 4 HOURS
Status: DISCONTINUED | OUTPATIENT
Start: 2023-10-23 | End: 2023-10-23

## 2023-10-23 RX ORDER — SODIUM CHLORIDE 9 MG/ML
INJECTION, SOLUTION INTRAVENOUS CONTINUOUS
Status: DISCONTINUED | OUTPATIENT
Start: 2023-10-23 | End: 2023-10-23 | Stop reason: HOSPADM

## 2023-10-23 RX ORDER — RIFAMPIN 300 MG/1
600 CAPSULE ORAL DAILY
Status: DISCONTINUED | OUTPATIENT
Start: 2023-10-23 | End: 2023-10-23

## 2023-10-23 RX ORDER — PREDNISONE 10 MG/1
10 TABLET ORAL DAILY
Status: DISCONTINUED | OUTPATIENT
Start: 2023-10-23 | End: 2023-10-23

## 2023-10-23 RX ORDER — NALOXONE HCL 0.4 MG/ML
0.02 VIAL (ML) INJECTION
Status: DISCONTINUED | OUTPATIENT
Start: 2023-10-23 | End: 2023-10-23 | Stop reason: HOSPADM

## 2023-10-23 RX ORDER — GLUCAGON 1 MG
1 KIT INJECTION
Status: DISCONTINUED | OUTPATIENT
Start: 2023-10-23 | End: 2023-10-23 | Stop reason: HOSPADM

## 2023-10-23 RX ORDER — FOLIC ACID 1 MG/1
1 TABLET ORAL DAILY
Status: DISCONTINUED | OUTPATIENT
Start: 2023-10-23 | End: 2023-10-23 | Stop reason: HOSPADM

## 2023-10-23 RX ORDER — SODIUM CHLORIDE 0.9 % (FLUSH) 0.9 %
10 SYRINGE (ML) INJECTION EVERY 12 HOURS PRN
Status: DISCONTINUED | OUTPATIENT
Start: 2023-10-23 | End: 2023-10-23 | Stop reason: HOSPADM

## 2023-10-23 RX ORDER — IBUPROFEN 200 MG
16 TABLET ORAL
Status: DISCONTINUED | OUTPATIENT
Start: 2023-10-23 | End: 2023-10-23 | Stop reason: HOSPADM

## 2023-10-23 RX ADMIN — SODIUM CHLORIDE, POTASSIUM CHLORIDE, SODIUM LACTATE AND CALCIUM CHLORIDE 500 ML: 600; 310; 30; 20 INJECTION, SOLUTION INTRAVENOUS at 02:10

## 2023-10-23 RX ADMIN — POTASSIUM CHLORIDE 40 MEQ: 1500 TABLET, EXTENDED RELEASE ORAL at 08:10

## 2023-10-23 RX ADMIN — MAGNESIUM SULFATE HEPTAHYDRATE 2 G: 40 INJECTION, SOLUTION INTRAVENOUS at 02:10

## 2023-10-23 RX ADMIN — IPRATROPIUM BROMIDE AND ALBUTEROL SULFATE 3 ML: 2.5; .5 SOLUTION RESPIRATORY (INHALATION) at 01:10

## 2023-10-23 RX ADMIN — IOHEXOL 75 ML: 350 INJECTION, SOLUTION INTRAVENOUS at 12:10

## 2023-10-23 RX ADMIN — AZITHROMYCIN 500 MG: 250 TABLET, FILM COATED ORAL at 01:10

## 2023-10-23 RX ADMIN — PREDNISONE 30 MG: 20 TABLET ORAL at 01:10

## 2023-10-23 RX ADMIN — OXYCODONE HYDROCHLORIDE 5 MG: 5 TABLET ORAL at 11:10

## 2023-10-23 RX ADMIN — MAGNESIUM SULFATE HEPTAHYDRATE 4 G: 40 INJECTION, SOLUTION INTRAVENOUS at 08:10

## 2023-10-23 RX ADMIN — PREDNISONE 10 MG: 10 TABLET ORAL at 08:10

## 2023-10-23 RX ADMIN — FOLIC ACID 1 MG: 1 TABLET ORAL at 08:10

## 2023-10-23 RX ADMIN — SODIUM CHLORIDE, POTASSIUM CHLORIDE, SODIUM LACTATE AND CALCIUM CHLORIDE 1000 ML: 600; 310; 30; 20 INJECTION, SOLUTION INTRAVENOUS at 12:10

## 2023-10-23 RX ADMIN — SODIUM CHLORIDE: 9 INJECTION, SOLUTION INTRAVENOUS at 05:10

## 2023-10-23 NOTE — ASSESSMENT & PLAN NOTE
Hgb 7.4 on admission. Patient received 2 units pRBC during last hospitalization    - Transfuse Hgb <7  - f/u multiple myeloma labs in setting of anemia with hypercalcemia  - daily CBC, CMP

## 2023-10-23 NOTE — ASSESSMENT & PLAN NOTE
Spiculated lesion with >7 mm in setting of hypercalcemia and concerns for underlying MM.   Per pulm, largest nodule stable likely representing benign nodule.    -counseled on smoking cessation  -no biopsy or PET rec at this time, low dose CT screen per guidelines for smoker with >20 pck year history

## 2023-10-23 NOTE — ED PROVIDER NOTES
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  Patient states chest pain is intermittent and currently 7/10.  He states that he is not receiving enough food or water at his living facility.  He complains of pain at his sacrum where he reports a sacral wound.  He states that staff at his living facility are not caring for his wound appropriately.  Patient says, "I cannot go back.  I am going to die there."      Review of patient's allergies indicates:  No Known Allergies  Past Medical History:   Diagnosis Date    Anemia 9/22/2023    BPH with obstruction/lower urinary tract symptoms 9/17/2023    Dyspepsia 9/22/2023    Iron deficiency anemia 6/13/2023    Latent tuberculosis 10/6/2023    Formatting of this note might be different from the original. NOTE: AFB from 7/17/2023 grew M. fortuitum. AFB smear was negative and all other smears and cultures have been negative to date. This organism is common in water and soil and represents either a transient infection or contamination (most likely the latter given no other evidence of it in sputum) and is not clinically significant.    Physical deconditioning 9/18/2023    Polyarthritis 6/13/2023    Primary insomnia 9/22/2023    Rheumatoid arthritis involving multiple sites with positive rheumatoid factor 7/12/2023    Formatting of this note might be different from the " original. diagnosed July 2023 Mostly in hands, wrists; and (to a lesser degree) in feet and ankles    Tobacco user 9/22/2023    Urinary retention due to benign prostatic hyperplasia 9/22/2023     History reviewed. No pertinent surgical history.  History reviewed. No pertinent family history.     Review of Systems    Physical Exam     Initial Vitals [10/22/23 2123]   BP Pulse Resp Temp SpO2   (!) 97/55 (!) 130 20 98.4 °F (36.9 °C) 97 %      MAP       --         Physical Exam    Nursing note and vitals reviewed.  Constitutional: He is not diaphoretic. No distress.   Chronically ill-appearing male lying in bed   HENT:   Head: Normocephalic and atraumatic.   Mouth/Throat: Oropharynx is clear and moist.   Eyes: Conjunctivae and EOM are normal.   Neck: Neck supple.   Normal range of motion.  Cardiovascular:  Normal rate, regular rhythm, normal heart sounds and intact distal pulses.           Pulmonary/Chest: Breath sounds normal. He has no wheezes.   Abdominal: Abdomen is soft. He exhibits no distension. There is no abdominal tenderness.   Musculoskeletal:         General: No tenderness or edema. Normal range of motion.      Cervical back: Normal range of motion and neck supple.      Comments: Right wrist contracted with arthritic changes.     Neurological: He is alert and oriented to person, place, and time. He has normal strength. No sensory deficit.   Skin: Skin is warm and dry. Capillary refill takes less than 2 seconds.   Dry, flaky skin.  See image below for sacral wound.            ED Course   Procedures  Labs Reviewed   CBC W/ AUTO DIFFERENTIAL - Abnormal; Notable for the following components:       Result Value    RBC 2.79 (*)     Hemoglobin 7.5 (*)     Hematocrit 25.1 (*)     MCH 26.9 (*)     MCHC 29.9 (*)     RDW 15.7 (*)     All other components within normal limits   COMPREHENSIVE METABOLIC PANEL - Abnormal; Notable for the following components:    BUN 7 (*)     Albumin 1.7 (*)     ALT 6 (*)     All other  components within normal limits   URINALYSIS, REFLEX TO URINE CULTURE - Abnormal; Notable for the following components:    Appearance, UA Hazy (*)     pH, UA >8.0 (*)     Protein, UA 1+ (*)     Occult Blood UA 1+ (*)     Leukocytes, UA 3+ (*)     All other components within normal limits    Narrative:     Specimen Source->Urine   URINALYSIS MICROSCOPIC - Abnormal; Notable for the following components:    RBC, UA 15 (*)     WBC, UA 58 (*)     All other components within normal limits    Narrative:     Specimen Source->Urine   CULTURE, BLOOD   CULTURE, BLOOD   CULTURE, URINE   TROPONIN I   B-TYPE NATRIURETIC PEPTIDE   LACTIC ACID, PLASMA   TROPONIN ISTAT   MAGNESIUM   PHOSPHORUS   TROPONIN I   MAGNESIUM   PHOSPHORUS   POCT TROPONIN   POCT TROPONIN          Imaging Results               CTA Chest Non-Coronary (PE Studies) (Final result)  Result time 10/23/23 02:45:16      Final result by Hiro Almonte MD (10/23/23 02:45:16)                   Impression:      1. Pulmonary arteries are patent to the segmental level without evidence of embolism.  2. Right axillary lymphadenopathy.  Left axilla is not well visualized secondary to artifact.  Axillary lymphadenopathy, a nonspecific finding, was reported on outside CT 08/22/2023.  3. Small bilateral pleural effusions, left more than right.  4. Multiple solid pulmonary nodules measuring up to 7 mm.  The largest nodule is spiculated.  In the absence of prior studies to establish stability, a 7 mm spiculated nodule should be considered suspicious.  Consideration for PET scan or biopsy should be considered.  Malignancy is not excluded.  (For multiple solid nodules with any 6 mm or greater, Fleischner Society guidelines recommend follow up with non-contrast chest CT at 3-6 months and 18-24 months after discovery. )  This report was flagged in Epic as abnormal.    Electronically signed by resident: Los Hand  Date:    10/23/2023  Time:    01:13    Electronically  signed by: Hiro Almonte MD  Date:    10/23/2023  Time:    02:45               Narrative:    EXAMINATION:  CTA CHEST NON CORONARY (PE STUDIES)    CLINICAL HISTORY:  Pulmonary embolism (PE) suspected, unknown D-dimer;    TECHNIQUE:  Low dose axial images, sagittal and coronal reformations were obtained from the thoracic inlet to the lung bases following the IV administration of 75 mL of Omnipaque 350.  Contrast timing was optimized to evaluate the pulmonary arteries.  MIP images were performed.    COMPARISON:  CT 08/22/2023 (report only)    FINDINGS:  SOFT TISSUES:  Right axillary lymphadenopathy measuring up to 1.4 cm (axial series 2, image 81).  Left axilla is not well visualized secondary to artifact from contrast bolus.  The visualized thyroid gland is unremarkable.    HEART & MEDIASTINUM: Pulmonary arteries are patent to the segmental level without evidence of embolism.  No mediastinal or hilar lymphadenopathy.  Heart is normal in size.  No pericardial effusion.    PLEURA:  Small bilateral pleural effusions, left more than right.  No pneumothorax.    LUNGS AND AIRWAYS:  Airways are patent.  Prominent centrilobular and paraseptal emphysema.  5 mm right upper lobe solid nodule (axial series 3, image 161).  7 mm right upper lobe nodule (axial series 3, image 212).  6 mm nodule in the lingula (axial series 3, image 378).    UPPER ABDOMEN (limited):  No pneumoperitoneum. Bilateral renal cysts.    BONES:  No acute fractures.  No aggressive osseous lesions.                                       X-Ray Chest AP Portable (Final result)  Result time 10/22/23 22:55:30      Final result by Hiro Almonte MD (10/22/23 22:55:30)                   Impression:      No significant change from prior study.      Electronically signed by: Hiro Almonte MD  Date:    10/22/2023  Time:    22:55               Narrative:    EXAMINATION:  XR CHEST AP PORTABLE    CLINICAL HISTORY:  Chest Pain;    TECHNIQUE:  Single frontal view of  the chest was performed.    COMPARISON:  10/16/2023.    FINDINGS:  Increased markings in the lungs, similar to prior.    Heart and lungs  appear unchanged when allowing for differences in technique and positioning.                                       Medications   lactated ringers bolus 500 mL (500 mLs Intravenous New Bag 10/23/23 0255)   lactated ringers bolus 1,000 mL (0 mLs Intravenous Stopped 10/23/23 0013)   vancomycin 2 g in dextrose 5 % 500 mL IVPB (0 mg Intravenous Stopped 10/23/23 0152)   piperacillin-tazobactam (ZOSYN) 4.5 g in dextrose 5 % in water (D5W) 100 mL IVPB (MB+) (0 g Intravenous Stopped 10/23/23 0000)   fentaNYL 50 mcg/mL injection 25 mcg (25 mcg Intravenous Given 10/22/23 2340)   iohexoL (OMNIPAQUE 350) injection 75 mL (75 mLs Intravenous Given 10/23/23 0009)   lactated ringers bolus 1,000 mL (0 mLs Intravenous Stopped 10/23/23 0158)     Medical Decision Making  Patient is hypotensive to 90/58 and tachycardic with heart rate of 130.  EKG shows sinus tachycardia.  Blood cultures collected and IV antibiotics started empirically. He is satting well on room air.  Labs notable for hemoglobin 7.5 (baseline).  CMP, BNP, troponin are within normal limits.  Patient's indwelling Fernandez catheter changed in ED. UA shows 15 RBC, 58 WBC and occasional bacteria.  Chest x-ray shows increased marking in the lungs with no significant change from prior imaging.  1 L IV fluid bolus given with improvement blood pressure to 99/61 and tachycardia improved to heart rate 114.  Patient given 25 mcg of IV fentanyl for sacral wound pain.  On reassessment, patient is sleeping comfortably. CTA of chest is negative for PE with notable small bilateral pleural effusions, left more than right. There were also incidental findings of multiple solid pulmonary nodules measuring up to 7 mm with the largest nodule being spiculated. Additional 1L IVF bolus given with continued improvement of BP and HR. Patient admitted to Hospital  Medicine for further management.     Amount and/or Complexity of Data Reviewed  Labs: ordered. Decision-making details documented in ED Course.  Radiology: ordered. Decision-making details documented in ED Course.  ECG/medicine tests: ordered. Decision-making details documented in ED Course.    Risk  Prescription drug management.              Attending Attestation:   Physician Attestation Statement for Resident:  As the supervising MD   Physician Attestation Statement: I have personally seen and examined this patient.   I agree with the above history.  -:   As the supervising MD I agree with the above PE.     As the supervising MD I agree with the above treatment, course, plan, and disposition.    I have reviewed and agree with the residents interpretation of the following: lab data, x-rays, CT scans and EKG.  I have reviewed the following: old records at this facility.                                Clinical Impression:   Final diagnoses:  [R07.9] Chest pain  [I95.9] Hypotension, unspecified hypotension type (Primary)  [R91.1] Pulmonary nodule        ED Disposition Condition    Observation Stable                Ericka Stephenson MD  Resident  10/23/23 0304       Tonie Robles MD  10/23/23 0339

## 2023-10-23 NOTE — PHARMACY MED REC
"Admission Medication History     The home medication history was taken by Prince Aguilar.    You may go to "Admission" then "Reconcile Home Medications" tabs to review and/or act upon these items.     The home medication list has been updated by the Pharmacy department.   Please read ALL comments highlighted in yellow.   Please address this information as you see fit.    Feel free to contact us if you have any questions or require assistance.      Medications listed below were obtained from: Nursing home  Current Outpatient Medications on File Prior to Encounter   Medication Sig    acetaminophen (TYLENOL) 325 MG tablet Take 975 mg by mouth every 6 (six) hours as needed for Pain.    diclofenac sodium (VOLTAREN) 1 % Gel Apply 2 g topically 4 (four) times daily as needed (hand pain). (Do not apply to open wounds, eyes or mucus membrane)    folic acid (FOLVITE) 1 MG tablet Take 1 mg by mouth once daily.    hydroxychloroquine 300 mg Tab Take 300 mg by mouth once daily.    melatonin (MELATIN) 5 mg Take 5 mg by mouth nightly.    methotrexate, PF, 12.5 mg/0.5 mL Syrg Inject 25 mg into the skin every Sunday. Hold until cephalexin course finished.    mirtazapine (REMERON) 15 MG tablet Take 1 tablet (15 mg total) by mouth every evening.    MULTIVITAMIN WITH IRON ORAL Take 1 tablet by mouth once daily.    oxyCODONE (ROXICODONE) 5 MG immediate release tablet Take 5 mg by mouth every 6 (six) hours as needed for Pain.    polyethylene glycol (GLYCOLAX) 17 gram PwPk Mix 17 gm with 6 to 8 ounces of water and take daily.    predniSONE (DELTASONE) 10 MG tablet Take 10 mg by mouth every evening.    rifAMpin (RIFADIN) 300 MG capsule Take 600 mg by mouth once daily.    sulfaSALAzine (AZULFIDINE) 500 mg Tab Take 1,000 mg by mouth 2 (two) times daily.    tamsulosin (FLOMAX) 0.4 mg Cap Take 1 capsule by mouth nightly.    thiamine 100 MG tablet Take 1 tablet by mouth every evening.    tiZANidine (ZANAFLEX) 2 MG tablet Take 2 mg by mouth every 6 " (six) hours as needed (spasms).       Potential issues to be addressed PRIOR TO DISCHARGE  The listed medications were obtained from another facility ([facility]). The patient may not have been able to fill these prescriptions prior to this admission and may require new scripts upon discharge.           Prince Aguilar  EXT [id number]                  .

## 2023-10-23 NOTE — ED NOTES
Pt. Undressed and placed in hospital gown.  Cardiac monitor, BP Cuff, and Oxygen Sensor applied to finger. Pt. Fernandez draining well. Pillow placed for comfort.  Blankets applied.  Wedged on right side.

## 2023-10-23 NOTE — PLAN OF CARE
[facility] - Emergency Dept  Discharge Assessment    Primary Care Provider: Miguel Gardner MD     Pt is a resident of [facility] and will return on d/c    Discharge Assessment (most recent)       BRIEF DISCHARGE ASSESSMENT - 10/23/23 0985          Discharge Planning    Assessment Type Discharge Planning Brief Assessment     Resource/Environmental Concerns none     Support Systems Family members     Current Living Arrangements residential facility (P)      Care Facility Name [facility] (P)      Patient/Family Anticipates Transition to long-term care facility (P)      DME Needed Upon Discharge  none (P)      Discharge Plan A Return to nursing home (P)      Discharge Plan B Return to Nursing Home (P)                    Wendy Sy CD, MSW, LMSW, RSW   Case Management  [facility]  Email: [email or web address]

## 2023-10-23 NOTE — ED NOTES
Pt. Had one large bm.  Pt. Cleaned and a small bedsore 2 cm by 2 cm noted to the left gluteal muscle.

## 2023-10-23 NOTE — H&P
[facility] - Emergency Dept  [facility] Medicine  History & Physical    Patient Name: Chirag Beckham  MRN: [id number]  Patient Class: OP- Observation  Admission Date: 10/22/2023  Attending Physician: Kyle Bedoya MD   Primary Care Provider: Miguel Gardner MD         Patient information was obtained from patient, past medical records and ER records.     Subjective:     Principal Problem:<principal problem not specified>    Chief Complaint:   Chief Complaint   Patient presents with    Shortness of Breath     Pt complaint to staff of [facility] because he wants to come back to the hospital, d/c Friday for treatment of UTI, still on antibiotics    Chest Pain        HPI: Mr. 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 Patient recently discharged on 10/20 for admission of UTI with chronic indwelling disla. Patient is a resident of [facility]. Disla placed for urinary retention 2/2 BPH in September. Pt was instructed to follow up with [facility] but was lost to f/u. Patient refused voiding trial during last admission and given instructions to follow up as outpatient. Hypercalcemia treated with IV hydration. OSH workup showed SPEP WNL and peripheral smear unremarkable; B2 microglobulin, light chains, IgG, and IgA elevated. Heme/onc recommended outpatient follow up after MM workup resulted. Patient was subsequently discharged on 10/20.    He presents again from [facility] with complaints of SOB, chest pain that started earlier on 10/22 while he was laying in bed. He denies any jaw pain, arm numbness, radiation of pain. He denies any exacerbation or improvement of pain with positional changes. He denies any fever, chills, abdominal pain, nausea/vomiting, lightheadedness. On arrival to the ED, patient is tachycardic  with . Started on IV fluids with improvement in tachycardia and symptoms of chest discomfort. Labs notable for anemia with hemoglobin 7.5 (at baseline). Troponin and BNP normal. CTA obtained to evaluate for PE with no evidence of pulmonary embolism. Small bilateral pleural effusions noted and multiple pulmonary nodules, including 7 mm spiculated nodule. UA with 3+ leukocytes, 58 WBC, 15 RBC. Urine and blood cultures pending.       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 NOTE: AFB from 7/17/2023 grew M. fortuitum. AFB smear was negative and all other smears and cultures have been negative to date. This organism is common in water and soil and represents either a transient infection or contamination (most likely the latter given no other evidence of it in sputum) and is not clinically significant.    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 diagnosed July 2023 Mostly in hands, wrists; and (to a lesser degree) in feet and ankles    Tobacco user 9/22/2023    Urinary retention due to benign prostatic hyperplasia 9/22/2023       History reviewed. No pertinent surgical history.    Review of patient's allergies indicates:  No Known Allergies    No current facility-administered medications on file prior to encounter.     Current Outpatient Medications on File Prior to Encounter   Medication Sig    acetaminophen (TYLENOL) 325 MG tablet Take 975 mg by mouth every 6 (six) hours as needed for Pain.    diclofenac sodium (VOLTAREN) 1 % Gel Apply 2 g topically 4 (four) times daily as  needed (hand pain). (Do not apply to open wounds, eyes or mucus membrane)    folic acid (FOLVITE) 1 MG tablet Take 1 mg by mouth once daily.    hydroxychloroquine 300 mg Tab Take 300 mg by mouth once daily.    melatonin (MELATIN) 5 mg Take 5 mg by mouth nightly.    methotrexate, PF, 12.5 mg/0.5 mL Syrg Inject 25 mg into the skin every Sunday. Hold until cephalexin course finished.    mirtazapine (REMERON) 15 MG tablet Take 1 tablet (15 mg total) by mouth every evening.    MULTIVITAMIN WITH IRON ORAL Take 1 tablet by mouth once daily.    oxyCODONE (ROXICODONE) 5 MG immediate release tablet Take 5 mg by mouth every 6 (six) hours as needed for Pain.    polyethylene glycol (GLYCOLAX) 17 gram PwPk Mix 17 gm with 6 to 8 ounces of water and take daily.    potassium bicarbonate (K-LYTE) disintegrating tablet Take 2 tablets (40 mEq total) by mouth once daily.    predniSONE (DELTASONE) 10 MG tablet Take 10 mg by mouth every evening.    rifAMpin (RIFADIN) 300 MG capsule Take 600 mg by mouth once daily.    sulfaSALAzine (AZULFIDINE) 500 mg Tab Take 1,000 mg by mouth 2 (two) times daily.    tamsulosin (FLOMAX) 0.4 mg Cap Take 1 capsule by mouth nightly.    thiamine 100 MG tablet Take 1 tablet by mouth every evening.    tiZANidine (ZANAFLEX) 2 MG tablet Take 2 mg by mouth every 6 (six) hours as needed (spasms).     Family History    None       Tobacco Use    Smoking status: Not on file    Smokeless tobacco: Not on file   Substance and Sexual Activity    Alcohol use: Not on file    Drug use: Not on file    Sexual activity: Not on file     Review of Systems   Constitutional:  Negative for appetite change, chills, fatigue and fever.   Eyes:  Negative for pain and visual disturbance.   Respiratory:  Positive for shortness of breath. Negative for cough.    Cardiovascular:  Positive for chest pain. Negative for leg swelling.   Gastrointestinal:  Negative for abdominal pain, diarrhea, nausea and vomiting.   Genitourinary:  Negative for  dysuria and flank pain.   Musculoskeletal:  Positive for arthralgias.   Skin:  Positive for wound (sacral).   Neurological:  Positive for weakness. Negative for light-headedness and headaches.   Psychiatric/Behavioral:  Negative for agitation and confusion.      Objective:     Vital Signs (Most Recent):  Temp: 98.4 °F (36.9 °C) (10/22/23 2123)  Pulse: (!) 114 (10/23/23 0235)  Resp: 11 (10/23/23 0235)  BP: 112/75 (10/23/23 0235)  SpO2: 96 % (10/23/23 0145) Vital Signs (24h Range):  Temp:  [98.4 °F (36.9 °C)] 98.4 °F (36.9 °C)  Pulse:  [108-130] 114  Resp:  [11-24] 11  SpO2:  [88 %-99 %] 96 %  BP: ()/(55-75) 112/75        There is no height or weight on file to calculate BMI.     Physical Exam  Vitals reviewed.   Constitutional:       General: He is not in acute distress.     Appearance: He is ill-appearing.      Comments: Cachectic. Patient resting comfortably in bed. Answering questions appropriately.   HENT:      Head: Normocephalic and atraumatic.      Mouth/Throat:      Mouth: Mucous membranes are moist.      Pharynx: Oropharynx is clear.   Eyes:      Extraocular Movements: Extraocular movements intact.      Pupils: Pupils are equal, round, and reactive to light.   Cardiovascular:      Rate and Rhythm: Regular rhythm. Tachycardia present.   Pulmonary:      Effort: Pulmonary effort is normal.      Breath sounds: Normal breath sounds. No wheezing or rales.   Abdominal:      General: Bowel sounds are normal. There is no distension.      Palpations: Abdomen is soft. There is no mass.      Tenderness: There is abdominal tenderness (on palpation).   Musculoskeletal:         General: Tenderness and deformity (bilateral hands) present.      Right lower leg: No edema.      Left lower leg: No edema.   Skin:     General: Skin is dry.      Findings: No rash.      Comments: Dry skin   Neurological:      Mental Status: He is alert and oriented to person, place, and time.   Psychiatric:         Mood and Affect: Mood  normal.         Behavior: Behavior normal.          CRANIAL NERVES     CN III, IV, VI   Pupils are equal, round, and reactive to light.     Significant Labs: All pertinent labs within the past 24 hours have been reviewed.    Significant Imaging: I have reviewed all pertinent imaging results/findings within the past 24 hours.    Assessment/Plan:     Chest pain  63 year old man with history or chronic indwelling disla, hypercalcemia, severe RA presenting for admission from NH with complaints of chest pain and SOB starting earlier in the evening. Patient was laying flat when pain and symptoms occurred. On arrival to the ED, patient is tachycardic, normotensive. EKG showing sinus tachycardia. CTA without evidence of PE, however notable for small bilateral pleural effusions and multiple pulmonary nodules, including 7 mm spiculated nodule.     Patient received IV fluids in the ED, noting improvement in chest pain and shortness of breath at the time of patient interview. Patient tachycardic with low-normal BP on presentation concerning for dehydration.    - PE, pneumothorax ruled out  - no evidence of ischemia on EKG, BNP and troponin normal  - chest pain and SOB symptoms may be 2/2 anemia, GERD. Patient previously on famotidine, discontinued on discharge from last admission. Patient denied any relation of symptoms with food intake, sour taste in mouth or hoarse voice    - monitor vitals, repeat EKG, chest x-ray if worsening chest pain  - cardiac telemetry  - f/u calcium levels; MM workup  - monitor CBC, transfuse Hgb<7      Shortness of breath  See chest pain      Chronic indwelling Disla catheter  See urinary retention from BPH      Nursing home resident  [place]  resident      Hypercalcemia  History of hypercalcemia requiring IV fluids during last hospitalization. On admission, calcium 9.9, corrected to 11.7    The patient has hypercalcemia that is currently controlled. The patient has the following symptoms due to  their hypercalcemia: None (asymptomatic). The hypercalcemia is likely due to disla obstruction, poor PO intake or bedbound status. We will obtain the following labs to work up the hypercalcemia: completed during last hospitalization with PTH 12.9. We will treat the hypercalcemia with: IV fluids ordered at a rate of 200 ml/hr. Their latest calcium has been reviewed and is listed below.  Ionized Calcium   Date Value Ref Range Status   10/07/2023 1.65 (H) 1.06 - 1.42 mmol/L Final       - s/p IV fluids in ED; continue IV fluids at 200 cc/hr  - encourage PO hydration   - consider additional continuous IV fluids if calcium up trending  - SPEP[place] [facility]  - IgG, IgA, B2 microglobulin, light chains elevated. Per heme onc no urgent need for bone biopsy and initial workup more likely reactive than MM. Plans for outpatient follow up.    Urinary retention due to benign prostatic hyperplasia  Hx of urinary retention from BPH now with indwelling disla. Recently admitted for UTI 2/2 disla obstruction.Renal US done 9/15 with prostatic enlargement and urinary stasis. Started on Flomax and indwelling disla catheter placed 9/16.  Patient refused voiding trial during last hospitalization and discharged with instructions to follow up with urology as outpatient. Disla catheter exchanged during last hospitalization. Asymptomatic bacteriuria after disla exchange not requiring further antibiotic therapy.    - routine disla care  - monitor I/Os  - continue home Flomax  - continuous IV fluids   - outpatient urology follow up if patient still refusing voiding trial as inpatient      Rheumatoid arthritis involving multiple sites with positive rheumatoid factor  Severe RA in bilateral hands and wrist joints with ankle involvement as well. Was seen by rheumatology at [facility] and was received weekly DMARD injections.     - Current regimen of hydroxychloroquine, sulfasalazine, prednisone  - continue regimen  - PRN pain medication      Latent  tuberculosis  On past admission, pt had AFB smear which resulted as Mycobacterium Fortuitum. Repeat AFB was negative. Growth on smear appeared nontuberculous, so low concern for harboring resistance; pt started rifampin per ID recs on July 20th, duration 16 wks, so EOT 11/20/2023 for Latent TB. No respiratory symptoms at present.     - Continue Rifampin 600mg qd with EOT on 11/20/23, per OSH documentation.      Iron deficiency anemia  Hgb 7.4 on admission. Patient received 2 units pRBC during last hospitalization    - Transfuse Hgb <7  - f/u multiple myeloma labs in setting of anemia with hypercalcemia  - daily CBC, CMP        VTE Risk Mitigation (From admission, onward)           Ordered     enoxaparin injection 40 mg  Daily         10/23/23 0325     IP VTE HIGH RISK PATIENT  Once         10/23/23 0325     Place sequential compression device  Until discontinued         10/23/23 0325                                   Marlyn Poon MD  Department of Hospital Medicine  [facility] - Emergency Dept    COMPLETED  Family history is reviewed and has not changed   Pertinent information:

## 2023-10-23 NOTE — ASSESSMENT & PLAN NOTE
Hx of urinary retention from BPH now with indwelling disla. Recently admitted for UTI 2/2 disla obstruction.Renal US done 9/15 with prostatic enlargement and urinary stasis. Started on Flomax and indwelling disla catheter placed 9/16.  Patient refused voiding trial during last hospitalization and discharged with instructions to follow up with urology as outpatient. Disla catheter exchanged during last hospitalization. Asymptomatic bacteriuria after disla exchange not requiring further antibiotic therapy.    - routine disla care  - monitor I/Os  - continue home Flomax  - continuous IV fluids   - outpatient urology follow up if patient still refusing voiding trial as inpatient

## 2023-10-23 NOTE — ED NOTES
Patient identifiers for Chirag Beckham 63 y.o. male checked and correct.  Chief Complaint   Patient presents with    Shortness of Breath     Pt complaint to staff of 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 NOTE: AFB from 7/17/2023 grew M. fortuitum. AFB smear was negative and all other smears and cultures have been negative to date. This organism is common in water and soil and represents either a transient infection or contamination (most likely the latter given no other evidence of it in sputum) and is not clinically significant.    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 diagnosed July 2023 Mostly in hands, wrists; and (to a lesser degree) in feet and ankles    Tobacco user 9/22/2023    Urinary retention due to benign prostatic hyperplasia 9/22/2023     Allergies reported: Review of patient's allergies indicates:  No Known Allergies      LOC: Patient is awake, alert, and aware of environment with an appropriate affect. Patient is oriented x 4 and speaking appropriately.  APPEARANCE: Patient resting comfortably and in no acute distress. Patient is clean and well groomed, patient's clothing is properly fastened.  SKIN: The skin is warm and dry. Patient has normal skin turgor and moist mucus membranes.   MUSKULOSKELETAL: Patient is moving all extremities well, no obvious deformities noted. Pulses intact. Bilateral hand swelling   RESPIRATORY: Airway is open  and patent. Respirations are spontaneous and non-labored with normal effort and rate.  CARDIAC: Patient sinus tachy on cardiac monitor. No peripheral edema noted.   ABDOMEN: No distention noted. Soft and non-tender upon palpation.  NEUROLOGICAL: PERRL. Facial expression is symmetrical. Hand grasps are equal bilaterally. Normal sensation in all extremities when touched with finger.  : Fernandez in place

## 2023-10-23 NOTE — PLAN OF CARE
NURSING HOME ORDERS    10/23/2023  [facility] - EMERGENCY DEPT  [address] [address]  Dept: [phone]  Loc: [phone]     Admit to Nursing Home:  Skilled Nursing Facility    Diagnoses:  Active Hospital Problems    Diagnosis  POA    *Shortness of breath [R06.02]  Yes    Hypercalcemia [E83.52]  Yes     Priority: 1 - High     Chronic    Chest pain [R07.9]  Yes    Multiple lung nodules on CT [R91.8]  Yes    Pressure injury of sacral region, stage 2 [L89.152]  Yes    Chronic indwelling Fernandez catheter [Z97.8]  Not Applicable     Chronic    Latent tuberculosis [Z22.7]  Yes     Formatting of this note might be different from the original.  NOTE: AFB from 7/17/2023 grew M. fortuitum. AFB smear was negative and all other smears and cultures have been negative to date. This organism is common in water and soil and represents either a transient infection or contamination (most likely the latter given no other evidence of it in sputum) and is not clinically significant.      Urinary retention due to benign prostatic hyperplasia [N40.1, R33.8]  Yes     Chronic    Nursing home resident [Z59.3]  Not Applicable     Chronic     [facility]      Gastroesophageal reflux disease [K21.9]  Yes     Chronic    Physical deconditioning [R53.81]  Yes     Chronic    Rheumatoid arthritis involving multiple sites with positive rheumatoid factor [M05.79]  Yes     Chronic     Formatting of this note might be different from the original.  diagnosed July 2023  Mostly in hands, wrists; and (to a lesser degree) in feet and ankles      Iron deficiency anemia [D50.9]  Yes     Chronic      Resolved Hospital Problems   No resolved problems to display.       Patient is homebound due to:  Hypercalcemia    Allergies:Review of patient's allergies indicates:  No Known Allergies    Vitals:  Every shift    Diet: regular diet    Activities:   Activity as tolerated    Goals of Care Treatment  Preferences:  Code Status: Full Code      Labs:  Recheck CMP in 1 week, then per facility    Nursing Precautions:  Fall and Pressure ulcer prevention    Consults:   PT to evaluate and treat- 4 times a week, OT to evaluate and treat- 4 times a week, and Nutrition to evaluate and recommend diet     Miscellaneous Care: Fernandez Care: Empty Fernandez bag every shift.  Change Fernandez every month    Wound Care: yes:  Pressure Ulcer(s) Stage II :     Location: Sacrum  Apply Miconzazole:  Barrier ointment                       Frequency:  Daily                             If incontinent of stool or urine, apply thin layer Barrier cream                   twice daily and PRN to wound         Pressure relief measure:  for pressure redistribution    Clean with coloplast wipes or normal saline, dab dry and apply triad dressing, avoid foam border dressing.        Pressure ulcer prevention:  Immerse mattress if available with frequent shifting of position every 2-4 hours.        Appointments needed:  -Follow-up with primary care doctor.  -Hematology oncology to establish care for multiple myeloma/lung lesion.  [place] [facility] [place]    Medications: Discontinue all previous medication orders, if any. See new list below.     Medication List        START taking these medications      albuterol-ipratropium 2.5 mg-0.5 mg/3 mL nebulizer solution  Commonly known as: DUO-NEB  Take 3 mLs by nebulization every 4 (four) hours. Rescue     azithromycin 250 MG tablet  Commonly known as: Z-GONSALO  Take 1 tablet (250 mg total) by mouth once daily. for 4 doses  Start taking on: October 24, 2023     predniSONE 10 MG tablet  Commonly known as: DELTASONE  Start course of 40 mg total (4 tablets) daily until 10/27/23. Starting 10/28/23, take 10 mg by mouth every evening as instructed below.    STIOLTO RESPIMAT 2.5-2.5 mcg/actuation Mist  Generic drug: tiotropium-olodateroL  Inhale 2 puffs into the lungs once daily. Controller             CONTINUE taking these medications      acetaminophen 325 MG tablet  Commonly known as: TYLENOL  Take 975 mg by mouth every 6 (six) hours as needed for Pain.     diclofenac sodium 1 % Gel  Commonly known as: VOLTAREN  Apply 2 g topically 4 (four) times daily as needed (hand pain). (Do not apply to open wounds, eyes or mucus membrane)     folic acid 1 MG tablet  Commonly known as: FOLVITE  Take 1 mg by mouth once daily.     hydroxychloroquine 300 mg Tab  Take 300 mg by mouth once daily.     melatonin 5 mg  Commonly known as: MELATIN  Take 5 mg by mouth nightly.     methotrexate (PF) 12.5 mg/0.5 mL Syrg  Inject 25 mg into the skin every Sunday. Hold until cephalexin course finished.     mirtazapine 15 MG tablet  Commonly known as: REMERON  Take 1 tablet (15 mg total) by mouth every evening.     MULTIVITAMIN WITH IRON ORAL  Take 1 tablet by mouth once daily.     oxyCODONE 5 MG immediate release tablet  Commonly known as: ROXICODONE  Take 5 mg by mouth every 6 (six) hours as needed for Pain.     polyethylene glycol 17 gram Pwpk  Commonly known as: GLYCOLAX  Mix 17 gm with 6 to 8 ounces of water and take daily.     predniSONE 10 MG tablet  Commonly known as: DELTASONE  Complete course of 40 mg total (4 tablets) daily until 10/27/23. Starting 10/28/23, take 10 mg by mouth every evening.     rifAMpin 300 MG capsule  Commonly known as: RIFADIN  Take 600 mg by mouth once daily.     sulfaSALAzine 500 mg Tab  Commonly known as: AZULFIDINE  Take 1,000 mg by mouth 2 (two) times daily.     tamsulosin 0.4 mg Cap  Commonly known as: FLOMAX  Take 1 capsule by mouth nightly.     thiamine 100 MG tablet  Take 1 tablet by mouth every evening.     tiZANidine 2 MG tablet  Commonly known as: ZANAFLEX  Take 2 mg by mouth every 6 (six) hours as needed (spasms).            STOP taking these medications      potassium bicarbonate disintegrating tablet  Commonly known as: K-LYTE                Immunizations Administered as of 10/23/2023        Name Date Dose VIS Date Route Exp Date    COVID-19, MRNA, LN-S, PF (Pfizer) (Purple Cap) 10/21/2021 -- -- Intramuscular --    Site: Left arm     : [employer]     Lot: [id number]     COVID-19, MRNA, LN-S, PF (Pfizer) (Purple Cap) 5/31/2021 30 mcg -- -- --    Lot: [id number]     COVID-19, MRNA, LN-S, PF (Pfizer) (Purple Cap) 5/31/2021 30 mL -- Intramuscular --    Site: Right deltoid     : Pf[employer]     Lot: [id number]             This patient has had both covid vaccinations    Some patients may experience side effects after vaccination.  These may include fever, headache, muscle or joint aches.  Most symptoms resolve with 24-48 hours and do not require urgent medical evaluation unless they persist for more than 72 hours or symptoms are concerning for an unrelated medical condition.          _________________________________  Elida Bocanegra MD  10/23/2023     conducted a detailed discussion...

## 2023-10-23 NOTE — PLAN OF CARE
[facility] - Emergency Dept  Discharge Final Note    Primary Care Provider: Miguel Gardner MD    Expected Discharge Date: 10/23/2023    Pt is nursing home resident of [facility] [address] [address]    Pt scheduled for ambulance return for 1500 hrs; note scheduled time does not guarantee arrival time.    Nurse to call report to [phone]    Cancer Navigator will contact pt within 24-48hrs for first appointment      Final Discharge Note (most recent)       Final Note - 10/23/23 1156          Final Note    Assessment Type Final Discharge Note (P)      Anticipated Discharge Disposition Intermediate Care Facility for senior care or Supportive Care (P)         Post-Acute Status    Post-Acute Authorization Placement (P)      Post-Acute Placement Status Set-up Complete/Auth obtained (P)      Discharge Delays None known at this time (P)                      Wendy Sy CD, MSW, LMSW, RSW   Case Management  [facility]  Email: [email or web address]      Important Message from Medicare

## 2023-10-23 NOTE — HOSPITAL COURSE
Pt admitted for shortness of breath and chest pain, vital signs reflecting sinus tachycardia but otherwise stable. Cardiac work-up negative. Labs significant for hypercalcemia (previous problem with plans for OP work-up w/ hematology). Pt was given IVFs overnight with some improvement of symptoms. CTA negative for PE, lesion in R lung initially with concerns for malignancy. Pulm consulted for lung nodule - team [place] [facility] [place] and deemed lesion stable, likely benign and recommended against additional imaging and biopsy. Pulmonology team with concerns for SOB due to acute COPD/emphysema exacerbation in light of tobacco use history. Pt started on azithromycin, prednisone, and OP maintenance inhaler. Pt also with complaints of sacral pain, has stage 2 pressure ulcer. Pain control given with new wound care instructions for facility. Pt set up with heme onc for follow up of hypercalcemia with continued MM work-up, pulmonology referral, and and PCP follow-up. He is to continue the rest of his care with established [facility] providers for rheumatology and infectious disease follow-up.

## 2023-10-23 NOTE — CONSULTS
[facility] Consult Note    Primary Attending Physician: Vicente Costello MD  Consultant Attending: Kyle Bedoya MD  Consultant Fellow: Michelle Siddiqui DO    Reason for Consult:   Spiculated pulmonary nodule, shortness of breath    Subjective:      History of Present Illness:  Chirag Beckham is a 63 y.o. with a past medical history of GERD, BPH with urinary retention w/ chronic disla, severe RA affecting bilateral hands and feet, chronic hypercalcemia, and latent TB (on rifampin until 11/20/23) admitted with complaints of SOB and chest pain.      He reported that his shortness of breath began on 10/22 while he was laying in bed.  He cannot identify an inciting factor. He currently endorses chest pain, cough, and SOB. Denies any fever, chills, nausea, vomiting, dysphagia, weight loss, hemoptysis, abdominal pain, changes in bowel or bladder function, dizziness, or LOC. 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 He is currently on rifampin for treatment of latent TB.  Has a history of emphysema and is not on and inhalers or home oxygen. He is currently being worked up for multiple myeloma in the setting of hypercalcemia, chronic anemia, and UA w/ proteinuria. OSH workup showed SPEP WNL and peripheral smear unremarkable; B2 microglobulin, light chains, IgG, and IgA elevated. He also has a history of RA      Denies any family or personal history of malignancy.   He is a former smoker with a  8.75 pack year history for the past 30 years, quit 1 month ago. Denies any current use of alcohol or recreational drugs. Denies any extensive travel history outside of [place] recently. Currently lives alone in [facility].     CT Chest was done in the ED which showed significant emphysema as well as 5 mm right upper lobe solid nodule, 7 mm right upper lobe nodule, and  6 mm nodule in the lingula,  which have been noted in previous studies and are stable. Echo from 10/23/23 with EF of 60-65%, normal diastolic function.      Past Medical History:  Past Medical History:   Diagnosis Date    Anemia 9/22/2023    BPH with obstruction/lower urinary tract symptoms 9/17/2023    Dyspepsia 9/22/2023    Iron deficiency anemia 6/13/2023    Latent tuberculosis 10/6/2023    Formatting of this note might be different from the original. NOTE: AFB from 7/17/2023 grew M. fortuitum. AFB smear was negative and all other smears and cultures have been negative to date. This organism is common in water and soil and represents either a transient infection or contamination (most likely the latter given no other evidence of it in sputum) and is not clinically significant.    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 diagnosed July 2023 Mostly in hands, wrists; and (to a lesser degree) in feet and ankles    Tobacco user 9/22/2023    Urinary retention due to benign prostatic hyperplasia 9/22/2023       Past Surgical History:  History reviewed. No pertinent surgical history.    Allergies:  Review of patient's allergies indicates:  No Known Allergies    Medications:   In-Hospital Scheduled Medications:   enoxparin  40 mg Subcutaneous Daily    folic acid  1 mg Oral Daily    hydroxychloroquine  300 mg Oral Daily    magnesium sulfate IVPB  2 g Intravenous Once    sulfaSALAzine  1,000 mg Oral BID    tamsulosin  1 capsule Oral Nightly    tiotropium bromide  2 puff Inhalation Daily      In-Hospital PRN Medications:  acetaminophen, dextrose 10%, dextrose 10%, diclofenac sodium, glucagon (human recombinant), glucose, glucose, naloxone, sodium chloride 0.9%   In-Hospital IV Infusion Medications:   sodium chloride 0.9% 200 mL/hr at 10/23/23 0577       Home Medications:  Prior to Admission medications    Medication Sig Start Date End Date Taking? Authorizing Provider   acetaminophen (TYLENOL) 325 MG tablet Take 975 mg by mouth every 6 (six) hours as needed for Pain.   Yes Provider, Historical   diclofenac sodium (VOLTAREN) 1 % Gel Apply 2 g topically 4 (four) times daily as needed (hand pain). (Do not apply to open wounds, eyes or mucus membrane) 9/18/23  Yes Provider, Historical   folic acid (FOLVITE) 1 MG tablet Take 1 mg by mouth once daily. 9/18/23  Yes Provider, Historical   hydroxychloroquine 300 mg Tab Take 300 mg by mouth once daily. 9/18/23  Yes Provider, Historical   melatonin (MELATIN) 5 mg Take 5 mg by mouth nightly. 9/16/23  Yes Provider, Historical   methotrexate, PF, 12.5 mg/0.5 mL Syrg Inject 25 mg into the skin every Sunday. Hold until cephalexin course finished. 10/15/23  Yes Fredrick Carbajal MD   mirtazapine (REMERON) 15 MG tablet Take 1 tablet (15 mg total) by mouth every evening. 10/20/23 11/19/23 Yes Beatrice Guerin MD   MULTIVITAMIN WITH IRON ORAL Take 1 tablet by mouth once daily.   Yes Provider, Historical   oxyCODONE (ROXICODONE) 5 MG immediate release tablet Take 5 mg by mouth every 6 (six) hours as needed for Pain. 9/18/23  Yes Provider, Historical   polyethylene glycol (GLYCOLAX) 17 gram PwPk Mix 17 gm with 6 to 8 ounces of water and take daily. 9/17/23  Yes Provider, Historical   predniSONE (DELTASONE) 10 MG tablet Take 10 mg by mouth every evening. 9/18/23  Yes Provider, Historical   rifAMpin (RIFADIN) 300 MG capsule Take 600 mg by mouth once daily. 9/18/23  Yes Provider, Historical   sulfaSALAzine (AZULFIDINE) 500 mg Tab Take 1,000 mg by mouth 2 (two) times daily. 9/18/23  Yes Provider, Historical   tamsulosin (FLOMAX) 0.4 mg Cap Take 1 capsule by mouth nightly. 9/18/23  Yes Provider, Historical   thiamine 100 MG tablet Take 1 tablet by mouth every evening. 9/16/23 9/15/24 Yes Provider, Historical   albuterol-ipratropium  "(DUO-NEB) 2.5 mg-0.5 mg/3 mL nebulizer solution Take 3 mLs by nebulization every 4 (four) hours. Rescue 10/23/23 10/22/24  Elida Bocanegra MD   azithromycin (Z-GONSALO) 250 MG tablet Take 1 tablet (250 mg total) by mouth once daily. for 4 doses 10/24/23 10/28/23  Elida Bocanegra MD   tiotropium-olodateroL (STIOLTO RESPIMAT) 2.5-2.5 mcg/actuation Mist Inhale 2 puffs into the lungs once daily. Controller 10/23/23   Elida Bocanegra MD   tiZANidine (ZANAFLEX) 2 MG tablet Take 2 mg by mouth every 6 (six) hours as needed (spasms). 23   James Farmer   potassium bicarbonate (K-LYTE) disintegrating tablet Take 2 tablets (40 mEq total) by mouth once daily. 10/20/23 10/23/23  Beatrice Guerin MD       Family History:  History reviewed. No pertinent family history.    Social History:  Social History     Tobacco Use    Smoking status: Every Day     Current packs/day: 1.00     Average packs/day: 1 pack/day for 40.0 years (40.0 ttl pk-yrs)     Types: Cigarettes     Start date: 10/23/1983       Review of Systems:  Constitutional:  Negative for chills, fever and weight loss.   HENT:  Negative for congestion and sinus pain.    Respiratory:  Positive for cough and shortness of breath. Negative for hemoptysis, sputum production and wheezing.    Cardiovascular:  Positive for chest pain and orthopnea. Negative for palpitations and leg swelling.   Gastrointestinal:  Negative for abdominal pain, blood in stool, constipation, diarrhea, nausea and vomiting.   Neurological:  Negative for dizziness, loss of consciousness and headaches.      Objective:   Last 24 Hour Vital Signs:  BP  Min: 90/58  Max: 122/75  Temp  Av.5 °F (36.9 °C)  Min: 98.3 °F (36.8 °C)  Max: 98.7 °F (37.1 °C)  Pulse  Av.8  Min: 102  Max: 130  Resp  Av.3  Min: 11  Max: 24  SpO2  Av %  Min: 88 %  Max: 100 %  Height  Av' 10" (177.8 cm)  Min: 5' 10" (177.8 cm)  Max: 5' 10" (177.8 cm)  Weight  Av.6 kg (180 lb)  Min: 81.6 kg (180 lb)  Max: 81.6 kg " (180 lb)  No intake/output data recorded.    Physical Examination:  Physical Exam  Constitutional:       General: He is not in acute distress.     Appearance: He is ill-appearing. He is not toxic-appearing.   HENT:      Head: Normocephalic.   Cardiovascular:      Rate and Rhythm: Regular rhythm. Tachycardia present.      Pulses: Normal pulses.      Heart sounds: Normal heart sounds. No murmur heard.     No gallop.   Pulmonary:      Effort: Pulmonary effort is normal. No respiratory distress.      Breath sounds: Normal breath sounds. No wheezing or rales.   Abdominal:      General: Abdomen is flat. Bowel sounds are normal. There is no distension.      Palpations: Abdomen is soft.   Musculoskeletal:         General: Deformity present.      Right lower leg: No edema.      Left lower leg: No edema.      Comments: Bilateral wrist deformities 2/2 to RA   Skin:     General: Skin is dry.   Neurological:      Mental Status: He is alert.     Laboratory:  Trended Lab Data:  Recent Labs     10/22/23  2245 10/23/23  0332 10/23/23  0511   WBC 9.01 7.82  --    HGB 7.5* 7.4*  --    HCT 25.1* 24.4*  --     336  --      --  135*   K 3.8  --  3.4*     --  101   CO2 24  --  25   BUN 7*  --  6*   CREATININE 0.6  --  0.6   GLU 97  --  99   BILITOT 0.4  --  0.3   AST 10  --  8*   ALT 6*  --  <5*   ALKPHOS 80  --  73   CALCIUM 9.9  --  9.6   ALBUMIN 1.7*  --  1.6*   PROT 7.4  --  6.7   MG 1.6  --  1.5*   PHOS 2.9  --  3.4       Cardiac:   Recent Labs   Lab 10/22/23  2245 10/23/23  0255   TROPONINI 0.013 0.006   BNP 34  --        Urinalysis:   Lab Results   Component Value Date    LABURIN No significant growth 10/17/2023    COLORU Yellow 10/22/2023    SPECGRAV 1.010 10/22/2023    NITRITE Negative 10/22/2023    KETONESU Negative 10/22/2023    UROBILINOGEN Normal 09/13/2023       Microbiology:  Microbiology Results (last 7 days)       Procedure Component Value Units Date/Time    Blood culture #2 **CANNOT BE ORDERED STAT**  [[id number]] Collected: 10/22/23 2244    Order Status: Completed Specimen: Blood from Peripheral, Antecubital, Left Updated: 10/23/23 0715     Blood Culture, Routine No Growth to date    Blood culture #1 **CANNOT BE ORDERED STAT** [[id number]] Collected: 10/22/23 2244    Order Status: Completed Specimen: Blood from Peripheral, Upper Arm, Right Updated: 10/23/23 0715     Blood Culture, Routine No Growth to date    Urine culture [[id number]] Collected: 10/22/23 2356    Order Status: No result Specimen: Urine Updated: 10/23/23 0027            Radiology:  Results for orders placed or performed during the hospital encounter of 10/22/23 (from the past 2160 hour(s))   CTA Chest Non-Coronary (PE Studies)    Narrative    EXAMINATION:  CTA CHEST NON CORONARY (PE STUDIES)    CLINICAL HISTORY:  Pulmonary embolism (PE) suspected, unknown D-dimer;    TECHNIQUE:  Low dose axial images, sagittal and coronal reformations were obtained from the thoracic inlet to the lung bases following the IV administration of 75 mL of Omnipaque 350.  Contrast timing was optimized to evaluate the pulmonary arteries.  MIP images were performed.    COMPARISON:  CT 08/22/2023 (report only)    FINDINGS:  SOFT TISSUES:  Right axillary lymphadenopathy measuring up to 1.4 cm (axial series 2, image 81).  Left axilla is not well visualized secondary to artifact from contrast bolus.  The visualized thyroid gland is unremarkable.    HEART & MEDIASTINUM: Pulmonary arteries are patent to the segmental level without evidence of embolism.  No mediastinal or hilar lymphadenopathy.  Heart is normal in size.  No pericardial effusion.    PLEURA:  Small bilateral pleural effusions, left more than right.  No pneumothorax.    LUNGS AND AIRWAYS:  Airways are patent.  Prominent centrilobular and paraseptal emphysema.  5 mm right upper lobe solid nodule (axial series 3, image 161).  7 mm right upper lobe nodule (axial series 3, image 212).  6 mm nodule in the lingula (axial  series 3, image 378).    UPPER ABDOMEN (limited):  No pneumoperitoneum. Bilateral renal cysts.    BONES:  No acute fractures.  No aggressive osseous lesions.      Impression    1. Pulmonary arteries are patent to the segmental level without evidence of embolism.  2. Right axillary lymphadenopathy.  Left axilla is not well visualized secondary to artifact.  Axillary lymphadenopathy, a nonspecific finding, was reported on outside CT 08/22/2023.  3. Small bilateral pleural effusions, left more than right.  4. Multiple solid pulmonary nodules measuring up to 7 mm.  The largest nodule is spiculated.  In the absence of prior studies to establish stability, a 7 mm spiculated nodule should be considered suspicious.  Consideration for PET scan or biopsy should be considered.  Malignancy is not excluded.  (For multiple solid nodules with any 6 mm or greater, Fleischner Society guidelines recommend follow up with non-contrast chest CT at 3-6 months and 18-24 months after discovery. )  This report was flagged in Epic as abnormal.    Electronically signed by resident: Los Hand  Date:    10/23/2023  Time:    01:13    Electronically signed by: Hiro Almonte MD  Date:    10/23/2023  Time:    02:45     CXR  FINDINGS:  Increased markings in the lungs, similar to prior.     Heart and lungs  appear unchanged when allowing for differences in technique and positioning.     Impression:     No significant change from prior study.       I have personally reviewed the above labs and imaging.    Current Medications:     Infusions:   sodium chloride 0.9% 200 mL/hr at 10/23/23 0516        Scheduled:   enoxparin  40 mg Subcutaneous Daily    folic acid  1 mg Oral Daily    hydroxychloroquine  300 mg Oral Daily    magnesium sulfate IVPB  2 g Intravenous Once    sulfaSALAzine  1,000 mg Oral BID    tamsulosin  1 capsule Oral Nightly    tiotropium bromide  2 puff Inhalation Daily        PRN:  acetaminophen, dextrose 10%, dextrose 10%,  diclofenac sodium, glucagon (human recombinant), glucose, glucose, naloxone, sodium chloride 0.9%     Assessment:     Chirag Beckham is a 63 y.o. male with:  Patient Active Problem List    Diagnosis Date Noted    Shortness of breath 10/23/2023    Chest pain 10/23/2023    Multiple lung nodules on CT 10/23/2023    Pressure injury of sacral region, stage 2 10/23/2023    Obstruction of Fernandez catheter 10/17/2023    Bacteriuria 10/17/2023    Palliative care encounter 10/17/2023    Hypokalemia 10/07/2023    Chronic indwelling Fernandez catheter 10/07/2023    Latent tuberculosis 10/06/2023    Gastroesophageal reflux disease 09/22/2023    Constipation 09/22/2023    Primary insomnia 09/22/2023    Urinary retention due to benign prostatic hyperplasia 09/22/2023    Tobacco user 09/22/2023    Nursing home resident 09/22/2023    Physical deconditioning 09/18/2023    BPH with obstruction/lower urinary tract symptoms 09/17/2023    Hypercalcemia 08/28/2023    Severe protein-calorie malnutrition 08/21/2023    Rheumatoid arthritis involving multiple sites with positive rheumatoid factor 07/12/2023    Iron deficiency anemia 06/13/2023    Polyarthritis 06/13/2023    Intractable pain 06/10/2023        Plan:     #Multiple pulmonary nodules  #Right upper lobe spiculated nodule   #COPD  #Latent TB   Patient presented secondary to shortness of breath and chest discomfort which has improved. CT chest showed pulmonary nodules ranging from 5-7 mm with the 7 mm nodule of the right upper lobe having a spiculated appearance, along with significant emphysema. Previous CT chest from 7/2023 and 5/2021 show nodules of similar size and appearance therefore nodule is likely benign. He does have a significant smoking history with CT showing large bullous emphysema which could contributed to shortness of breath. He is currently not on any inhalers and would likely benefit from this.    Recommendations:  - Recommend treatment for COPD with azithromycin and  prednisone 40 mg daily for 5 days  - Recommend starting LABA/LAMA upon discharge, Anoro is likely to be covered by insurance  - Recommend follow up of pulmonary nodules with repeat CT scan in 6 months  - Recommend outpatient PFTs  - Continue rifampin for latent TB and follow up with ID  - Goal oxygen saturation >88%  - We will sign off, if any more questions or concerns please reach out to pulmonary on-call     Thank you for allowing us to participate in the care of this patient. Please contact me if you have any questions regarding this consult.    Discussed with Dr. Costello.     Michelle Siddiqui MD  [facility] Fellow

## 2023-10-23 NOTE — CONSULTS
The pt is a 63 year-old male smoker with PMH of emphysema, cigarette smoking, LTBI currently on rifampin, & RA who had a recent hospitalization at [facility] for RA with plan to continue daily low dose prednisone, weekly Methotrexate, hydroxychloroquine, and sulfasalazine. In addition to above RA treatment, there was discussion of plans to start outpatient infusions of Abatacept though it does not appear that this has been done yes.     Pt presented to the ED with shortness of breath, chest tightness, & worsening cough over the past week. Pt reports that cough is productive of green mucus. Pt denies fevers. Pt reports that he is trying to quit smoking currently & his last cigarette was one month ago.    Pulmonary service is consulted due to lung nodule.    On exam pt exhibits diminished breath sounds bilaterally. Pt exhibits joint deformities & effusions particularly his right wrist & knees. Pt appears contracted. Pt has diminished strength.    1) Acute exacerbation of COPD/Emphysema  - Recommend prednisone 40mg daily x5 days. After that, resume outpt prednisone does (which pt is taking for RA).  - Recommend azithromycin x5 days.  - Recommend starting stiolto respimat 2 puffs daily now.    2) Lung nodule in the right upper lobe  - We reviewed patient's imaging in the [facility] system. This nodule was present on May 2021 CT done at [facility]. There has been no significant growth since that time. Given no significant growth over a 29 month timespan, this is a benign nodule. Recommend against additional imaging or biopsy at this time.    3) Pt had one AFB culture positive for M fortuitum 7/2023 at [facility]. All other sputums negative. Would not recommend treatment based on current findings.    4) Cigarette nicotine dependence  - Counseled pt on smoking cessation.    5) LTBI  - Recommend completing tx as recommended by ID.    Recommend case management place referral to [facility] pulmonary clinic for follow up.    Grady Creek MD [facility]  Pulmonary

## 2023-10-23 NOTE — SUBJECTIVE & OBJECTIVE
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 NOTE: AFB from 7/17/2023 grew M. fortuitum. AFB smear was negative and all other smears and cultures have been negative to date. This organism is common in water and soil and represents either a transient infection or contamination (most likely the latter given no other evidence of it in sputum) and is not clinically significant.    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 diagnosed July 2023 Mostly in hands, wrists; and (to a lesser degree) in feet and ankles    Tobacco user 9/22/2023    Urinary retention due to benign prostatic hyperplasia 9/22/2023       History reviewed. No pertinent surgical history.    Review of patient's allergies indicates:  No Known Allergies    No current facility-administered medications on file prior to encounter.     Current Outpatient Medications on File Prior to Encounter   Medication Sig    acetaminophen (TYLENOL) 325 MG tablet Take 975 mg by mouth every 6 (six) hours as needed for Pain.    diclofenac sodium (VOLTAREN) 1 % Gel Apply 2 g topically 4 (four) times daily as needed (hand pain). (Do not apply to open wounds, eyes or mucus membrane)    folic acid (FOLVITE) 1 MG tablet Take 1 mg by mouth once daily.    hydroxychloroquine 300 mg Tab Take 300 mg by mouth once daily.    melatonin (MELATIN) 5 mg Take 5 mg by mouth nightly.    methotrexate, PF, 12.5 mg/0.5 mL Syrg Inject 25 mg into the skin every Sunday. Hold until cephalexin course finished.    mirtazapine (REMERON) 15 MG tablet Take 1 tablet (15 mg  total) by mouth every evening.    MULTIVITAMIN WITH IRON ORAL Take 1 tablet by mouth once daily.    oxyCODONE (ROXICODONE) 5 MG immediate release tablet Take 5 mg by mouth every 6 (six) hours as needed for Pain.    polyethylene glycol (GLYCOLAX) 17 gram PwPk Mix 17 gm with 6 to 8 ounces of water and take daily.    potassium bicarbonate (K-LYTE) disintegrating tablet Take 2 tablets (40 mEq total) by mouth once daily.    predniSONE (DELTASONE) 10 MG tablet Take 10 mg by mouth every evening.    rifAMpin (RIFADIN) 300 MG capsule Take 600 mg by mouth once daily.    sulfaSALAzine (AZULFIDINE) 500 mg Tab Take 1,000 mg by mouth 2 (two) times daily.    tamsulosin (FLOMAX) 0.4 mg Cap Take 1 capsule by mouth nightly.    thiamine 100 MG tablet Take 1 tablet by mouth every evening.    tiZANidine (ZANAFLEX) 2 MG tablet Take 2 mg by mouth every 6 (six) hours as needed (spasms).     Family History    None       Tobacco Use    Smoking status: Not on file    Smokeless tobacco: Not on file   Substance and Sexual Activity    Alcohol use: Not on file    Drug use: Not on file    Sexual activity: Not on file     Review of Systems   Constitutional:  Negative for appetite change, chills, fatigue and fever.   Eyes:  Negative for pain and visual disturbance.   Respiratory:  Positive for shortness of breath. Negative for cough.    Cardiovascular:  Positive for chest pain. Negative for leg swelling.   Gastrointestinal:  Negative for abdominal pain, diarrhea, nausea and vomiting.   Genitourinary:  Negative for dysuria and flank pain.   Musculoskeletal:  Positive for arthralgias.   Skin:  Positive for wound (sacral).   Neurological:  Positive for weakness. Negative for light-headedness and headaches.   Psychiatric/Behavioral:  Negative for agitation and confusion.      Objective:     Vital Signs (Most Recent):  Temp: 98.4 °F (36.9 °C) (10/22/23 2123)  Pulse: (!) 114 (10/23/23 0235)  Resp: 11 (10/23/23 0235)  BP: 112/75 (10/23/23 0235)  SpO2: 96 %  (10/23/23 0145) Vital Signs (24h Range):  Temp:  [98.4 °F (36.9 °C)] 98.4 °F (36.9 °C)  Pulse:  [108-130] 114  Resp:  [11-24] 11  SpO2:  [88 %-99 %] 96 %  BP: ()/(55-75) 112/75        There is no height or weight on file to calculate BMI.     Physical Exam  Vitals reviewed.   Constitutional:       General: He is not in acute distress.     Appearance: He is ill-appearing.      Comments: Cachectic. Patient resting comfortably in bed. Answering questions appropriately.   HENT:      Head: Normocephalic and atraumatic.      Mouth/Throat:      Mouth: Mucous membranes are moist.      Pharynx: Oropharynx is clear.   Eyes:      Extraocular Movements: Extraocular movements intact.      Pupils: Pupils are equal, round, and reactive to light.   Cardiovascular:      Rate and Rhythm: Regular rhythm. Tachycardia present.   Pulmonary:      Effort: Pulmonary effort is normal.      Breath sounds: Normal breath sounds. No wheezing or rales.   Abdominal:      General: Bowel sounds are normal. There is no distension.      Palpations: Abdomen is soft. There is no mass.      Tenderness: There is abdominal tenderness (on palpation).   Musculoskeletal:         General: Tenderness and deformity (bilateral hands) present.      Right lower leg: No edema.      Left lower leg: No edema.   Skin:     General: Skin is dry.      Findings: No rash.      Comments: Dry skin   Neurological:      Mental Status: He is alert and oriented to person, place, and time.   Psychiatric:         Mood and Affect: Mood normal.         Behavior: Behavior normal.          CRANIAL NERVES     CN III, IV, VI   Pupils are equal, round, and reactive to light.     Significant Labs: All pertinent labs within the past 24 hours have been reviewed.    Significant Imaging: I have reviewed all pertinent imaging results/findings within the past 24 hours.

## 2023-10-23 NOTE — HPI
Mr. 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 Patient recently discharged on 10/20 for admission of UTI with chronic indwelling disla. Patient is a resident of [facility]. Disla placed for urinary retention 2/2 BPH in September. Pt was instructed to follow up with [facility] but was lost to f/u. Patient refused voiding trial during last admission and given instructions to follow up as outpatient. Hypercalcemia treated with IV hydration. OSH workup showed SPEP WNL and peripheral smear unremarkable; B2 microglobulin, light chains, IgG, and IgA elevated. Heme/onc recommended outpatient follow up after MM workup resulted. Patient was subsequently discharged on 10/20.    He presents again from [facility] with complaints of SOB, chest pain that started earlier on 10/22 while he was laying in bed. He denies any jaw pain, arm numbness, radiation of pain. He denies any exacerbation or improvement of pain with positional changes. He denies any fever, chills, abdominal pain, nausea/vomiting, lightheadedness. On arrival to the ED, patient is tachycardic with . Started on IV fluids with improvement in tachycardia and symptoms of chest discomfort. Labs notable for anemia with hemoglobin 7.5 (at baseline). Troponin and BNP normal. CTA obtained to evaluate for PE with no evidence of pulmonary embolism. Small bilateral pleural effusions noted and multiple pulmonary nodules, including 7 mm spiculated nodule. UA with 3+ leukocytes, 58 WBC, 15 RBC. Urine and blood cultures pending.

## 2023-10-23 NOTE — ASSESSMENT & PLAN NOTE
63 year old man with history or chronic indwelling disla, hypercalcemia, severe RA presenting for admission from NH with complaints of chest pain and SOB starting earlier in the evening. Patient was laying flat when pain and symptoms occurred. On arrival to the ED, patient is tachycardic, normotensive. EKG showing sinus tachycardia. CTA without evidence of PE, however notable for small bilateral pleural effusions and multiple pulmonary nodules, including 7 mm spiculated nodule.     Patient received IV fluids in the ED, noting improvement in chest pain and shortness of breath at the time of patient interview. Patient tachycardic with low-normal BP on presentation concerning for dehydration.    - PE, pneumothorax ruled out  - no evidence of ischemia on EKG, BNP and troponin normal  - chest pain and SOB symptoms may be 2/2 anemia, GERD. Patient previously on famotidine, discontinued on discharge from last admission. Patient denied any relation of symptoms with food intake, sour taste in mouth or hoarse voice    - monitor vitals, repeat EKG, chest x-ray if worsening chest pain  - cardiac telemetry  - f/u calcium levels; MM workup  - monitor CBC, transfuse Hgb<7

## 2023-10-23 NOTE — DISCHARGE SUMMARY
[facility] - Emergency Dept  Hospital Medicine  Discharge Summary      Patient Name: Chirag Beckham  MRN: [id number]  JASON: [id number]  Patient Class: OP- Observation  Admission Date: 10/22/2023  Hospital Length of Stay: 0 days  Discharge Date and Time:  10/23/2023 1:52 PM  Attending Physician: Kyle Bedoya MD   Discharging Provider: Elida Bocanegra MD  Primary Care Provider: Miguel Gardner MD  [facility] Medicine Team: [facility] HOSP MED V Elida Bocanegra MD  Primary Care Team: [facility] HOSP MED V    HPI:   Mr. 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 Patient recently discharged on 10/20 for admission of UTI with chronic indwelling disla. Patient is a resident of [facility]. Disla placed for urinary retention 2/2 BPH in September. Pt was instructed to follow up with [facility] but was lost to f/u. Patient refused voiding trial during last admission and given instructions to follow up as outpatient. Hypercalcemia treated with IV hydration. OSH workup showed SPEP WNL and peripheral smear unremarkable; B2 microglobulin, light chains, IgG, and IgA elevated. Heme/onc recommended outpatient follow up after MM workup resulted. Patient was subsequently discharged on 10/20.    He presents again from [facility] with complaints of SOB, chest pain that started earlier on 10/22 while he was laying in bed. He denies any jaw pain, arm numbness, radiation of pain. He denies any exacerbation or improvement of pain with positional changes. He denies any fever, chills, abdominal pain, nausea/vomiting, lightheadedness. On arrival to the ED, patient is tachycardic with . Started on IV fluids with improvement in tachycardia and symptoms of chest discomfort. Labs notable for anemia with hemoglobin 7.5 (at baseline). Troponin and  BNP normal. CTA obtained to evaluate for PE with no evidence of pulmonary embolism. Small bilateral pleural effusions noted and multiple pulmonary nodules, including 7 mm spiculated nodule. UA with 3+ leukocytes, 58 WBC, 15 RBC. Urine and blood cultures pending.       * No surgery found *      Hospital Course:   Pt admitted for shortness of breath and chest pain, vital signs reflecting sinus tachycardia but otherwise stable. Cardiac work-up negative. Labs significant for hypercalcemia (previous problem with plans for OP work-up w/ hematology). Pt was given IVFs overnight with some improvement of symptoms. CTA negative for PE, lesion in R lung initially with concerns for malignancy. Pulm consulted for lung nodule - team [place] [facility] [place] and deemed lesion stable, likely benign and recommended against additional imaging and biopsy. Pulmonology team with concerns for SOB due to acute COPD/emphysema exacerbation in light of tobacco use history. Pt started on azithromycin, prednisone, and OP maintenance inhaler. Pt also with complaints of sacral pain, has stage 2 pressure ulcer. Pain control given with new wound care instructions for facility. Pt set up with heme onc for follow up of hypercalcemia with continued MM work-up, pulmonology referral, and and PCP follow-up. He is to continue the rest of his care with established [facility] for rheumatology and infectious disease follow-up.        Goals of Care Treatment Preferences:  Code Status: Full Code      Consults:   Consults (From admission, onward)        Status Ordering Provider     Inpatient consult to Pulmonology  Once        Provider:  (Not yet assigned)    Completed MECHELLE REA          Pulmonary  * Shortness of breath  On chronic pred 10 for severe RA.   CTA without PE, small bilateral pleural effusions, pulm nodule.  Cardiac work-up negative.   Subjectively improved per patient.    -pulmonology consulted in light of spiculated nodule:        -> per team, appears stable from old [place] [facility] [place] recs against biopsy/imaging       ->acute COPD/emphysema: d/c with Azithomycin 5 day course, pred 40 x5 days, and Stiolto respimat 2 puffs daily       ->resume home dose of 10 mg daily pred after completion of acute exacerbation dose       ->recs against M fortuitum tx as pt with one positive AFB culture and repeats negative, rifampin d/ania   -f/u pre-existing OP [place] [facility] [place] for Mycobacterium result    Multiple lung nodules on CT  Spiculated lesion with >7 mm in setting of hypercalcemia and concerns for underlying MM.   Per pulm, largest nodule stable likely representing benign nodule.    -counseled on smoking cessation  -no biopsy or PET rec at this time, low dose CT screen per guidelines for smoker with >20 pck year history     Renal/  Hypercalcemia  History of hypercalcemia requiring IV fluids during last hospitalization. On admission, calcium 9.9, corrected to 11.7    The patient has hypercalcemia that is currently controlled. The patient has the following symptoms due to their hypercalcemia: None (asymptomatic). The hypercalcemia is likely due to disla obstruction, poor PO intake or bedbound status. We will obtain the following labs to work up the hypercalcemia: completed during last hospitalization with PTH 12.9. We will treat the hypercalcemia with: IV fluids ordered at a rate of 200 ml/hr. Their latest calcium has been reviewed and is listed below.  Ionized Calcium   Date Value Ref Range Status   10/07/2023 1.65 (H) 1.06 - 1.42 mmol/L Final       - s/p IV fluids in ED; continue IV fluids at 200 cc/hr  - encourage PO hydration   - consider additional continuous IV fluids if calcium up trending  - SPEP, smear [place] [facility]  - IgG, IgA, B2 microglobulin, light chains elevated. Per heme onc no urgent need for bone biopsy and initial workup more likely reactive than MM. Plans for outpatient follow up.    ID  Latent tuberculosis  On past  admission, pt had AFB smear which resulted as Mycobacterium Fortuitum. Repeat AFB was negative. Growth on smear appeared nontuberculous, so low concern for harboring resistance; pt started rifampin per ID recs on July 20th, duration 16 wks, so EOT 11/20/2023 for Latent TB. No respiratory symptoms at present.     - Continue Rifampin 600mg qd with EOT on 11/20/23, per OSH documentation.      Orthopedic  Pressure injury of sacral region, stage 2  Pressure ulcer to sacrum, seen by wound care 10/18.  Pt with limited mobility, poor nutrition.    -continue multivitamin  -BOOST plus at nursing home  -wound care recs: can clean with coloplast wipes or normal saline, dab dry w/ triad dressing, NO FOAM BORDER bandages      Other  Nursing home resident  [place]  resident      Final Active Diagnoses:    Diagnosis Date Noted POA    PRINCIPAL PROBLEM:  Shortness of breath [R06.02] 10/23/2023 Yes    Hypercalcemia [E83.52] 08/28/2023 Yes     Chronic    Chest pain [R07.9] 10/23/2023 Yes    Multiple lung nodules on CT [R91.8] 10/23/2023 Yes    Pressure injury of sacral region, stage 2 [L89.152] 10/23/2023 Yes    Chronic indwelling Fernandez catheter [Z97.8] 10/07/2023 Not Applicable     Chronic    Latent tuberculosis [Z22.7] 10/06/2023 Yes    Urinary retention due to benign prostatic hyperplasia [N40.1, R33.8] 09/22/2023 Yes     Chronic    Nursing home resident [Z59.3] 09/22/2023 Not Applicable     Chronic    Gastroesophageal reflux disease [K21.9] 09/22/2023 Yes     Chronic    Physical deconditioning [R53.81] 09/18/2023 Yes     Chronic    Rheumatoid arthritis involving multiple sites with positive rheumatoid factor [M05.79] 07/12/2023 Yes     Chronic    Iron deficiency anemia [D50.9] 06/13/2023 Yes     Chronic      Problems Resolved During this Admission:       Discharged Condition: stable    Disposition: Skilled Nursing Facility    Follow Up:    Patient Instructions:      Ambulatory referral/consult to Hematology /  Oncology   Standing Status: Future   Referral Priority: Routine Referral Type: Consultation   Referral Reason: Specialty Services Required   Requested Specialty: Hematology and Oncology   Number of Visits Requested: 1     Ambulatory referral/consult to Pulmonology   Standing Status: Future   Referral Priority: Routine Referral Type: Consultation   Referral Reason: Specialty Services Required   Requested Specialty: Pulmonary Disease   Number of Visits Requested: 1       Significant Diagnostic Studies: Labs:   BMP:   Recent Labs   Lab 10/22/23  2245 10/23/23  0511   GLU 97 99    135*   K 3.8 3.4*    101   CO2 24 25   BUN 7* 6*   CREATININE 0.6 0.6   CALCIUM 9.9 9.6   MG 1.6 1.5*   , CBC   Recent Labs   Lab 10/22/23  2245 10/23/23  0332   WBC 9.01 7.82   HGB 7.5* 7.4*   HCT 25.1* 24.4*    336   , Troponin   Recent Labs   Lab 10/22/23  2245 10/23/23  0255   TROPONINI 0.013 0.006    and All labs within the past 24 hours have been reviewed    Pending Diagnostic Studies:     None         Medications:  Reconciled Home Medications:      Medication List      START taking these medications    albuterol-ipratropium 2.5 mg-0.5 mg/3 mL nebulizer solution  Commonly known as: DUO-NEB  Take 3 mLs by nebulization every 4 (four) hours. Rescue     azithromycin 250 MG tablet  Commonly known as: Z-GONSALO  Take 1 tablet (250 mg total) by mouth once daily. for 4 doses  Start taking on: October 24, 2023     STIOLTO RESPIMAT 2.5-2.5 mcg/actuation Mist  Generic drug: tiotropium-olodateroL  Inhale 2 puffs into the lungs once daily. Controller        CONTINUE taking these medications    acetaminophen 325 MG tablet  Commonly known as: TYLENOL  Take 975 mg by mouth every 6 (six) hours as needed for Pain.     diclofenac sodium 1 % Gel  Commonly known as: VOLTAREN  Apply 2 g topically 4 (four) times daily as needed (hand pain). (Do not apply to open wounds, eyes or mucus membrane)     folic acid 1 MG tablet  Commonly known as:  FOLVITE  Take 1 mg by mouth once daily.     hydroxychloroquine 300 mg Tab  Take 300 mg by mouth once daily.     melatonin 5 mg  Commonly known as: MELATIN  Take 5 mg by mouth nightly.     methotrexate (PF) 12.5 mg/0.5 mL Syrg  Inject 25 mg into the skin every Sunday. Hold until cephalexin course finished.     mirtazapine 15 MG tablet  Commonly known as: REMERON  Take 1 tablet (15 mg total) by mouth every evening.     MULTIVITAMIN WITH IRON ORAL  Take 1 tablet by mouth once daily.     oxyCODONE 5 MG immediate release tablet  Commonly known as: ROXICODONE  Take 5 mg by mouth every 6 (six) hours as needed for Pain.     polyethylene glycol 17 gram Pwpk  Commonly known as: GLYCOLAX  Mix 17 gm with 6 to 8 ounces of water and take daily.     predniSONE 10 MG tablet  Commonly known as: DELTASONE  Take 10 mg by mouth every evening.     rifAMpin 300 MG capsule  Commonly known as: RIFADIN  Take 600 mg by mouth once daily.     sulfaSALAzine 500 mg Tab  Commonly known as: AZULFIDINE  Take 1,000 mg by mouth 2 (two) times daily.     tamsulosin 0.4 mg Cap  Commonly known as: FLOMAX  Take 1 capsule by mouth nightly.     thiamine 100 MG tablet  Take 1 tablet by mouth every evening.     tiZANidine 2 MG tablet  Commonly known as: ZANAFLEX  Take 2 mg by mouth every 6 (six) hours as needed (spasms).        STOP taking these medications    potassium bicarbonate disintegrating tablet  Commonly known as: K-LYTE            Indwelling Lines/Drains at time of discharge:   Lines/Drains/Airways     Drain  Duration                Urethral Catheter 10/22/23 2345 16 Fr. <1 day                Time spent on the discharge of patient: 30 minutes         Elida Bocanegra MD  Department of Hospital Medicine  [facility] - Emergency Dept

## 2023-10-23 NOTE — ASSESSMENT & PLAN NOTE
History of hypercalcemia requiring IV fluids during last hospitalization. On admission, calcium 9.9, corrected to 11.7    The patient has hypercalcemia that is currently controlled. The patient has the following symptoms due to their hypercalcemia: None (asymptomatic). The hypercalcemia is likely due to disla obstruction, poor PO intake or bedbound status. We will obtain the following labs to work up the hypercalcemia: completed during last hospitalization with PTH 12.9. We will treat the hypercalcemia with: IV fluids ordered at a rate of 200 ml/hr. Their latest calcium has been reviewed and is listed below.  Ionized Calcium   Date Value Ref Range Status   10/07/2023 1.65 (H) 1.06 - 1.42 mmol/L Final       - s/p IV fluids in ED; continue IV fluids at 200 cc/hr  - encourage PO hydration   - consider additional continuous IV fluids if calcium up trending  - SPEP, smear [place] [facility]  - IgG, IgA, B2 microglobulin, light chains elevated. Per heme onc no urgent need for bone biopsy and initial workup more likely reactive than MM. Plans for outpatient follow up.

## 2023-10-23 NOTE — ASSESSMENT & PLAN NOTE
Pressure ulcer to sacrum, seen by wound care 10/18.  Pt with limited mobility, poor nutrition.    -continue multivitamin  -BOOST plus at nursing home  -wound care recs: can clean with coloplast wipes or normal saline, dab dry w/ triad dressing, NO FOAM BORDER bandages

## 2023-10-23 NOTE — ED TRIAGE NOTES
Chirag Beckham, a 63 y.o. male presents to the ED w/ complaint of CP, tachycardia, and SOB. Recently dc for a UTI, comes with a disla in. From [facility]    Triage note:  Chief Complaint   Patient presents with    Shortness of Breath     Pt complaint to staff of SOB because he wants to come back to the hospital, d/c Friday for treatment of UTI, still on antibiotics    Chest Pain     Review of patient's allergies indicates:  No Known Allergies  Past Medical History:   Diagnosis Date    Anemia 9/22/2023    BPH with obstruction/lower urinary tract symptoms 9/17/2023    Dyspepsia 9/22/2023    Iron deficiency anemia 6/13/2023    Latent tuberculosis 10/6/2023    Formatting of this note might be different from the original. NOTE: AFB from 7/17/2023 grew M. fortuitum. AFB smear was negative and all other smears and cultures have been negative to date. This organism is common in water and soil and represents either a transient infection or contamination (most likely the latter given no other evidence of it in sputum) and is not clinically significant.    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 diagnosed July 2023 Mostly in hands, wrists; and (to a lesser degree) in feet and ankles    Tobacco user 9/22/2023    Urinary retention due to benign prostatic hyperplasia 9/22/2023

## 2023-10-23 NOTE — ASSESSMENT & PLAN NOTE
Severe RA in bilateral hands and wrist joints with ankle involvement as well. Was seen by rheumatology at [facility] and was received weekly DMARD injections.     - Current regimen of hydroxychloroquine, sulfasalazine, prednisone  - continue regimen  - PRN pain medication

## 2023-10-23 NOTE — PLAN OF CARE
EDUARDO contacted [facility] scheduling *[phone].  As per [place]om a cancer navigator will contact pt within 24-48 hrs to schedule appointment and establish pt.  EDUARDO informed Blossom that pt is a resident of [facility] and provided the NH phone number [phone]      Wendy Sy CD, MSW, LMSW, RSW   Case Management  [facility]  Email: [email or web address]

## 2023-10-23 NOTE — ED NOTES
Assumed care at this time. Pt updated that he is just waiting on transport via [facility] ambulance back to [facility]. No needs at this timePt is alert and oriented and verbalizes understanding

## 2023-10-23 NOTE — PLAN OF CARE
EDUARDO sent updated NH orders via careport to [facility]       10/23/23 1016   Post-Acute Status   Post-Acute Authorization Placement   Post-Acute Placement Status Set-up Complete/Auth obtained   Discharge Delays None known at this time   Discharge Plan   Discharge Plan A Return to nursing home       EDUARDO sent referral and orders via careport to [facility].      Wendy Sy, ALISSA, MSW, LMSW, RSW   Case Management  [facility]  Email: [email or web address]

## 2023-10-23 NOTE — ASSESSMENT & PLAN NOTE
History of hypercalcemia requiring IV fluids during last hospitalization. On admission, calcium 9.9    The patient has hypercalcemia that is currently controlled. The patient has the following symptoms due to their hypercalcemia: None (asymptomatic). The hypercalcemia is likely due to disla obstruction, poor PO intake or bedbound status. We will obtain the following labs to work up the hypercalcemia: completed during last hospitalization with PTH 12.9. We will treat the hypercalcemia with: IV fluids ordered at a rate of 200 ml/hr. Their latest calcium has been reviewed and is listed below.  Ionized Calcium   Date Value Ref Range Status   10/07/2023 1.65 (H) 1.06 - 1.42 mmol/L Final       - s/p IV fluids in ED  - encourage PO hydration   - consider additional continuous IV fluids if calcium up trending  - SPEP, smear wnl from [facility]  - IgG, IgA, B2 microglobulin, light chains elevated. Per heme onc no urgent need for bone biopsy and initial workup more likely reactive than MM. Plans for outpatient follow up.

## 2023-10-24 LAB
BACTERIA UR CULT: NO GROWTH
PTH RELATED PROT SERPL-SCNC: 0.8 PMOL/L

## 2023-10-28 LAB
BACTERIA BLD CULT: NORMAL
BACTERIA BLD CULT: NORMAL

## 2023-11-13 DIAGNOSIS — R53.81 PHYSICAL DECONDITIONING: Primary | Chronic | ICD-10-CM

## 2024-09-11 NOTE — ASSESSMENT & PLAN NOTE
Continue home rifampin until 11/20/2023.   Detail Level: Detailed Depth Of Biopsy: dermis Was A Bandage Applied: Yes Size Of Lesion In Cm: 0 Biopsy Type: H and E Biopsy Method: Dermablade Anesthesia Type: 1% lidocaine without epinephrine Anesthesia Volume In Cc: 0.5 Hemostasis: Drysol Wound Care: Petrolatum Dressing: bandage Destruction After The Procedure: No Type Of Destruction Used: Curettage Curettage Text: The wound bed was treated with curettage after the biopsy was performed. Cryotherapy Text: The wound bed was treated with cryotherapy after the biopsy was performed. Electrodesiccation Text: The wound bed was treated with electrodesiccation after the biopsy was performed. Electrodesiccation And Curettage Text: The wound bed was treated with electrodesiccation and curettage after the biopsy was performed. Silver Nitrate Text: The wound bed was treated with silver nitrate after the biopsy was performed. Lab: 6 Lab Facility: 3 Consent: Written consent was obtained and risks were reviewed including but not limited to scarring, infection, bleeding, scabbing, incomplete removal, nerve damage and allergy to anesthesia. Post-Care Instructions: I reviewed with the patient in detail post-care instructions. Patient is to keep the biopsy site dry overnight, and then apply bacitracin twice daily until healed. Patient may apply hydrogen peroxide soaks to remove any crusting. Notification Instructions: Patient will be notified of biopsy results. However, patient instructed to call the office if not contacted within 2 weeks. Billing Type: Third-Party Bill Information: Selecting Yes will display possible errors in your note based on the variables you have selected. This validation is only offered as a suggestion for you. PLEASE NOTE THAT THE VALIDATION TEXT WILL BE REMOVED WHEN YOU FINALIZE YOUR NOTE. IF YOU WANT TO FAX A PRELIMINARY NOTE YOU WILL NEED TO TOGGLE THIS TO 'NO' IF YOU DO NOT WANT IT IN YOUR FAXED NOTE. Anticipated Plan (Based On Presumed Biopsy Results): MOHS

## 2024-09-27 NOTE — HPI
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 He lives in [facility] in [place], [place]. His primary care physician is Dr. Miguel Gardner. He gets his medical care at [facility].   He was hospitalized at [facility] from 7/13/2023 to 7/19/2023 for rheumatoid arthritis flare, emphysema, and latent tuberculosis infection.   He was hospitalized at [facility] from 7/19/2023 to 8/8/2023 for atypical chest pain and sepsis.   He was hospitalized at [facility] from 8/18/2023 to 9/18/2023 for rheumatoid arthritis pain and pneumonia. He completed an antibiotic course for community acquired pneumonia. Infectious Disease recommended continuing rifampin for his Mycobacterium fortuitum. It was started on 7/20/2023 and will isacc on 11/20/2023. Rheumatology recommended daily low dose prednisone with calcium and vitamin D, folic acid, weekly subcutaneous methotrexate, hydroxychloroquine daily, sulfasalazine twice daily, and outpatient infusions of abatacept 750 mg on week 0, 2, 4 and every 4 weeks thereafter at [facility]. He endorsed depressed mood due to his medical conditions, immobility/bed bound status, and nursing home placement. He was put on mirtazapine. Indwelling Fernandez catheter was placed. He was discharged to [facility] as a new residential nursing home resident.   He was approved for abatacept on 10/4/2023.   He presented to [facility] on 109/6/2023 with poor oral intake. He had never been at an [facility] previously but medical records from [facility] were available to review. He was moaning and complaining of bladder pain. He commented that his Fernandez catheter needed to be replaced. He had suprapubic distension and tenderness. Labs showed calcium 12.9 mg/dL, increased from 11.1 on 9/18/2023, potassium 3.0 mmol/L, magnesium 1.6 mg/dL. He was given oxycodone, potassium bicarbonate, potassium chloride, 2 liters of normal saline. He was admitted to Hospital Medicine Team [facility].   [FreeTextEntry2] : right elbow pain

## 2024-12-14 ENCOUNTER — HOSPITAL ENCOUNTER (EMERGENCY)
Facility: HOSPITAL | Age: 64
Discharge: NURSING, REHAB OR LONG-TERM CARE | End: 2024-12-14
Attending: EMERGENCY MEDICINE
Payer: MEDICAID

## 2024-12-14 VITALS
HEART RATE: 80 BPM | HEIGHT: 73 IN | BODY MASS INDEX: 25.71 KG/M2 | WEIGHT: 194 LBS | SYSTOLIC BLOOD PRESSURE: 125 MMHG | TEMPERATURE: 98 F | RESPIRATION RATE: 16 BRPM | OXYGEN SATURATION: 100 % | DIASTOLIC BLOOD PRESSURE: 72 MMHG

## 2024-12-14 DIAGNOSIS — T83.010A SUPRAPUBIC CATHETER DYSFUNCTION, INITIAL ENCOUNTER: Primary | ICD-10-CM

## 2024-12-14 PROCEDURE — 99283 EMERGENCY DEPT VISIT LOW MDM: CPT

## 2024-12-14 PROCEDURE — 51040 INCISE & DRAIN BLADDER: CPT

## 2024-12-14 NOTE — DISCHARGE INSTRUCTIONS
Your suprapubic catheter was exchanged in the emergency department.    Can follow up with your urologist if you have any additional concerns regarding the catheter or urine output.

## 2024-12-14 NOTE — ED PROVIDER NOTES
Encounter Date: 12/14/2024       History     Chief Complaint   Patient presents with    Suprapubic blockage     Arrives via EMS from [facility]. Clogged suprapubic catheter. Sent to have it exchanged.      64-year-old male history of RA, BPH and  urinary retention presents to the emergency department due to suprapubic catheter dysfunction onset yesterday.  Patient reports decreased flow into Fernandez bag and experienced urine from his penis which per pt occurs when his catheter is not working properly.  He states that he sees urology at [facility] and the suprapubic catheter was exchanged a month ago.  He receives a exchange every month.  Patient states this morning he went to [facility] as his catheter was not draining, however he states only the Fernandez bag was exchanged not the suprapubic catheter itself.  He would not endorse suprapubic pain.  However he is frustrated.      Review of patient's allergies indicates:  No Known Allergies  Past Medical History:   Diagnosis Date    Anemia 9/22/2023    BPH with obstruction/lower urinary tract symptoms 9/17/2023    Dyspepsia 9/22/2023    Iron deficiency anemia 6/13/2023    Latent tuberculosis 10/6/2023    Formatting of this note might be different from the original. NOTE: AFB from 7/17/2023 grew M. fortuitum. AFB smear was negative and all other smears and cultures have been negative to date. This organism is common in water and soil and represents either a transient infection or contamination (most likely the latter given no other evidence of it in sputum) and is not clinically significant.    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 diagnosed July 2023 Mostly in hands, wrists; and (to a lesser degree) in feet and ankles    Tobacco user 9/22/2023    Urinary retention due to  benign prostatic hyperplasia 9/22/2023     History reviewed. No pertinent surgical history.  No family history on file.  Social History     Tobacco Use    Smoking status: Every Day     Current packs/day: 1.00     Average packs/day: 1 pack/day for 41.1 years (41.1 ttl pk-yrs)     Types: Cigarettes     Start date: 10/23/1983    Smokeless tobacco: Never   Substance Use Topics    Drug use: Never     Review of Systems  See HPI  Physical Exam     Initial Vitals [12/14/24 1522]   BP Pulse Resp Temp SpO2   127/65 88 17 97.9 °F (36.6 °C) 99 %      MAP       --         Physical Exam    Vitals reviewed.  Constitutional: He is not diaphoretic. No distress.   HENT:   Head: Atraumatic.   Eyes: Conjunctivae and EOM are normal.   Neck:   Normal range of motion.  Cardiovascular:  Normal rate.           Pulmonary/Chest: No respiratory distress.   Abdominal: He exhibits no distension. There is no abdominal tenderness.   Suprapubic catheter in place 16 FR  No surrounding erythema induration or drainage from insertion site of catheter.  No suprapubic pain   There is no rebound.   Musculoskeletal:      Cervical back: Normal range of motion.      Comments: Contracture of wrist bilaterally (chronic RA)      Neurological: He is alert and oriented to person, place, and time.   Skin: Skin is warm and dry.         ED Course   Suprapubic Tube    Date/Time: 12/14/2024 6:28 PM    Performed by: Thi Salter PA-C  Authorized by: Michael Acevedo III, MD  Consent: Verbal consent obtained.  Risks and benefits: risks, benefits and alternatives were discussed  Consent given by: patient  Patient understanding: patient states understanding of the procedure being performed  Patient consent: the patient's understanding of the procedure matches consent given  Patient identity confirmed: verbally with patient  Indications: catheter change  Local anesthesia used: no    Anesthesia:  Local anesthesia used: no    Sedation:  Patient sedated:  no    Suprapubic aspiration by: catheter  Catheter type: Fernandez  Catheter size: 16 Fr  Number of attempts: 1  Urine characteristics: clear and yellow  Patient tolerance: patient tolerated the procedure well with no immediate complications        Labs Reviewed   HEPATITIS C ANTIBODY   HIV 1 / 2 ANTIBODY          Imaging Results    None          Medications - No data to display  Medical Decision Making  64-year-old male with acute narrowing retention and  BPH presents with suprapubic catheter dysfunction.  No flank or suprapubic discomfort afebrile.  Evaluated [facility] with Fernandez exchange however suprapubic catheter left in place.  See procedure note patient tolerated suprapubic catheter exchange by me, supervised by Dr. Acevedo.  Urine was visualized with normal flow.  Patient will follow up with his urologist at [facility] as instructed.  Discussed return precautions.  Patient discharged home to [facility]                                       Clinical Impression:  Final diagnoses:  [T83.010A] Suprapubic catheter dysfunction, initial encounter (Primary)          ED Disposition Condition    Discharge Stable          ED Prescriptions    None       Follow-up Information    None          Thi Salter, JENNY  12/14/24 [id number]

## 2024-12-14 NOTE — ED TRIAGE NOTES
Chirag Beckham, an 64 y.o. male presents to the ED via EMS from [facility] for exchange of a clogged suprapubic catheter. Denies CP, SOB, NVD, HA, fevers, chills. Hx of BPH, TB, RA, urinary retention.      Chief Complaint   Patient presents with    Suprapubic blockage     Arrives via EMS from [facility]. Clogged suprapubic catheter. Sent to have it exchanged.      Review of patient's allergies indicates:  No Known Allergies  Past Medical History:   Diagnosis Date    Anemia 9/22/2023    BPH with obstruction/lower urinary tract symptoms 9/17/2023    Dyspepsia 9/22/2023    Iron deficiency anemia 6/13/2023    Latent tuberculosis 10/6/2023    Formatting of this note might be different from the original. NOTE: AFB from 7/17/2023 grew M. fortuitum. AFB smear was negative and all other smears and cultures have been negative to date. This organism is common in water and soil and represents either a transient infection or contamination (most likely the latter given no other evidence of it in sputum) and is not clinically significant.    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 diagnosed July 2023 Mostly in hands, wrists; and (to a lesser degree) in feet and ankles    Tobacco user 9/22/2023    Urinary retention due to benign prostatic hyperplasia 9/22/2023

## 2024-12-14 NOTE — ED NOTES
LOC: The patient is awake, alert and aware of environment with an appropriate affect, the patient is oriented x 3 and speaking appropriately.   APPEARANCE: Patient appears comfortable and in no acute distress, patient is clean and well groomed.  SKIN: The skin is warm and dry, color consistent with ethnicity.   MUSCULOSKELETAL: Patient moving all extremities spontaneously, no swelling noted.  RESPIRATORY: Airway is open and patent, respirations are spontaneous, patient has a normal effort and rate, no accessory muscle use noted.  CARDIAC: Patient has a normal rate and regular rhythm, no edema noted, capillary refill < 3 seconds.   GASTRO: Abd distention noted.  : Suprapubic catheter in place. Pt states catheter is clogged.  NEURO: Pt opens eyes spontaneously, behavior appropriate to situation, follows commands.

## 2025-01-24 NOTE — ASSESSMENT & PLAN NOTE
See ACP 10/17-10/18. Very difficult to prognosticate without diagnosis and patient with some resistance to goals of care conversation as it remains unclear why he continues to decline.     -remains full code, full support   -recommend continued workup of hypercalcemia and anemia as may lead to a diagnosis that could help shape future context-driven goals of care conversations  -patient designates his sister, Jeanine as his surrogate decision-maker  -will engage SW to assist with MPOA form  -also appreciate palliative care  to help support patient- not a part of a yo community but cites he relationship with god as a source of strength   Quality 226: Preventive Care And Screening: Tobacco Use: Screening And Cessation Intervention: Patient screened for tobacco use and is an ex/non-smoker Detail Level: Detailed

## 2025-08-02 ENCOUNTER — HOSPITAL ENCOUNTER (EMERGENCY)
Facility: HOSPITAL | Age: 65
Discharge: NURSING, REHAB OR LONG-TERM CARE | End: 2025-08-02
Attending: EMERGENCY MEDICINE
Payer: MEDICARE

## 2025-08-02 VITALS
SYSTOLIC BLOOD PRESSURE: 116 MMHG | TEMPERATURE: 97 F | OXYGEN SATURATION: 95 % | BODY MASS INDEX: 18.55 KG/M2 | RESPIRATION RATE: 18 BRPM | WEIGHT: 140 LBS | HEIGHT: 73 IN | HEART RATE: 87 BPM | DIASTOLIC BLOOD PRESSURE: 67 MMHG

## 2025-08-02 DIAGNOSIS — T83.9XXA PROBLEM WITH FOLEY CATHETER, INITIAL ENCOUNTER: Primary | ICD-10-CM

## 2025-08-02 PROCEDURE — 99283 EMERGENCY DEPT VISIT LOW MDM: CPT | Mod: 25

## 2025-08-02 PROCEDURE — 51705 CHANGE OF BLADDER TUBE: CPT
